# Patient Record
Sex: MALE | Race: WHITE | NOT HISPANIC OR LATINO
[De-identification: names, ages, dates, MRNs, and addresses within clinical notes are randomized per-mention and may not be internally consistent; named-entity substitution may affect disease eponyms.]

---

## 2018-11-12 ENCOUNTER — APPOINTMENT (OUTPATIENT)
Dept: OPHTHALMOLOGY | Facility: CLINIC | Age: 73
End: 2018-11-12
Payer: MEDICARE

## 2018-11-12 PROCEDURE — 92014 COMPRE OPH EXAM EST PT 1/>: CPT

## 2018-11-12 PROCEDURE — 92015 DETERMINE REFRACTIVE STATE: CPT

## 2019-08-08 ENCOUNTER — EMERGENCY (EMERGENCY)
Facility: HOSPITAL | Age: 74
LOS: 1 days | Discharge: HOME | End: 2019-08-08
Attending: EMERGENCY MEDICINE | Admitting: EMERGENCY MEDICINE
Payer: MEDICARE

## 2019-08-08 VITALS
WEIGHT: 199.08 LBS | HEART RATE: 57 BPM | SYSTOLIC BLOOD PRESSURE: 172 MMHG | OXYGEN SATURATION: 97 % | RESPIRATION RATE: 18 BRPM | TEMPERATURE: 98 F | DIASTOLIC BLOOD PRESSURE: 107 MMHG | HEIGHT: 70 IN

## 2019-08-08 PROCEDURE — 93010 ELECTROCARDIOGRAM REPORT: CPT

## 2019-08-08 PROCEDURE — 99284 EMERGENCY DEPT VISIT MOD MDM: CPT | Mod: 25

## 2019-08-08 RX ORDER — AMLODIPINE BESYLATE 2.5 MG/1
10 TABLET ORAL ONCE
Refills: 0 | Status: COMPLETED | OUTPATIENT
Start: 2019-08-08 | End: 2019-08-08

## 2019-08-08 RX ORDER — SODIUM CHLORIDE 9 MG/ML
1000 INJECTION INTRAMUSCULAR; INTRAVENOUS; SUBCUTANEOUS ONCE
Refills: 0 | Status: COMPLETED | OUTPATIENT
Start: 2019-08-08 | End: 2019-08-08

## 2019-08-08 RX ORDER — FAMOTIDINE 10 MG/ML
20 INJECTION INTRAVENOUS ONCE
Refills: 0 | Status: COMPLETED | OUTPATIENT
Start: 2019-08-08 | End: 2019-08-08

## 2019-08-08 RX ORDER — ONDANSETRON 8 MG/1
4 TABLET, FILM COATED ORAL ONCE
Refills: 0 | Status: COMPLETED | OUTPATIENT
Start: 2019-08-08 | End: 2019-08-08

## 2019-08-08 RX ORDER — SODIUM CHLORIDE 9 MG/ML
1000 INJECTION INTRAMUSCULAR; INTRAVENOUS; SUBCUTANEOUS
Refills: 0 | Status: DISCONTINUED | OUTPATIENT
Start: 2019-08-08 | End: 2019-08-12

## 2019-08-08 NOTE — ED ADULT NURSE NOTE - PMH
Aortic valve disorder  Aortic stenosis  Calculus of kidney  Kidney stones  Essential hypertension  Hypertension  Hyperlipidemia  Hyperlipidemia  Malignant melanoma of skin  Melanoma

## 2019-08-08 NOTE — ED ADULT NURSE NOTE - OBJECTIVE STATEMENT
resents to ED for chest discomfort, nausea with abd pain and elevated BP.  Patient reports symptoms began this AM and have worsened through the day.  Denies any SOB, vomiting, dizziness, numbness, tingling, HA. Denies bowel or bladder issues.

## 2019-08-08 NOTE — ED ADULT NURSE NOTE - NSIMPLEMENTINTERV_GEN_ALL_ED
Implemented All Universal Safety Interventions:  [place] to call system. Call bell, personal items and telephone within reach. Instruct patient to call for assistance. Room bathroom lighting operational. Non-slip footwear when patient is off stretcher. Physically safe environment: no spills, clutter or unnecessary equipment. Stretcher in lowest position, wheels locked, appropriate side rails in place.

## 2019-08-08 NOTE — ED PROVIDER NOTE - CLINICAL SUMMARY MEDICAL DECISION MAKING FREE TEXT BOX
Pt c/o abd pain w ttp epigastric/periumbilical and n w elevated bp (174/84 R, 184/86 L; no evening bp meds as yet).  No cp, + mild sob.  ? dissection - bp symmetric but cp/abd pain, ? pud (no h/o this), ? pancreatitis (lipase nl), + mild change in bowel habits - ? enteritis, colitis.  EKG nl, trop neg x 1 and pain since this am - no sig concern for acs.   ? viral gi since c/o gi sx and gen weakness.  WBC nl, chem nl except bili but pt reports h/o elevated bili and fatty liver.  Plan cta for dissection, gi meds, ivf, reassess.

## 2019-08-08 NOTE — ED ADULT TRIAGE NOTE - CHIEF COMPLAINT QUOTE
Presents to ED for chest discomfort, nausea with abd pain and elevated BP.  Patient reports symptoms began this AM and have worsened through the day.  Denies any SOB, vomiting, dizziness, numbness, tingling, HA.

## 2019-08-08 NOTE — ED PROVIDER NOTE - PSH
Malignant melanoma of skin  extraction H/O cervical spine surgery    History of appendectomy    Malignant melanoma of skin  extraction  S/P AVR (aortic valve replacement)

## 2019-08-08 NOTE — ED PROVIDER NOTE - PROGRESS NOTE DETAILS
CTA neg for dissection, + adrenal mass increased from prior and R renal cyst but these don't account for pt's sx.  Pt feeling better after meds, tolerating po's.  Will dc to [facility] pmd, cardiology.

## 2019-08-08 NOTE — ED PROVIDER NOTE - PMH
Aortic valve disorder  Aortic stenosis  Calculus of kidney  Kidney stones  Essential hypertension  Hypertension  Hyperlipidemia  Hyperlipidemia  Malignant melanoma of skin  Melanoma Aortic valve disorder  Aortic stenosis  CAD (coronary artery disease)    Calculus of kidney  Kidney stones  Essential hypertension  Hypertension  Hyperlipidemia  Hyperlipidemia  Malignant melanoma of skin  Melanoma

## 2019-08-08 NOTE — ED PROVIDER NOTE - OBJECTIVE STATEMENT
75 yo male h/o htn, migraine, fatty liver, melanoma s/p resection, elevated t bili (unknown baseline), cad s/p cabg, s/p avr (porcine valve), s/p cspine fusion, s/p appendectomy c/o feeling unwell since ~ 9a today.  Pt felt abd discomfort mid abd earlier today - less severe now, + n w/o v, gen weakness w/o associated cp, palpitations, fever, dysuria.  + slight radiation towards chest earlier - none now.  No h/o pud, gallstones, pancreatitis, etoh abuse. No recent exertional cp/sob.  No sick contacts, uri sx, travel.  [place] and returned to [facility] 2/2 feeling unwell.  Pain vague now.  + increased bm today but denies watery stool, black/bloody stool.  No urinary sx.  Pt noted his bp was v high today - 180-90's despite his meds; pt has not taken his evening bp med yet tonight.

## 2019-08-08 NOTE — ED PROVIDER NOTE - NSFOLLOWUPINSTRUCTIONS_ED_ALL_ED_FT
Abdominal Pain    Take Zofran - 1 tab on tongue every 6 hours as needed for nausea.  Take pepcid once a day.  Follow up with Dr Covarrubias and Dr Sanabria.  Keep a log of your blood pressures and share with Dr Sanabria to decide if you need medication adjustment.  Return for increased pain, vomiting or diarrhea, fever, any other concerns.   Share your ct results and have Dr Covarrubias arrange further outpatient imaging of your R kidney and L adrenal gland.   Enjoy your belated anniversary.    Abdominal Pain    Many things can cause abdominal pain. Many times, abdominal pain is not caused by a disease and will improve without treatment. Your health care provider will do a physical exam to determine if there is a dangerous cause of your pain; blood tests and imaging may help determine the cause of your pain. However, in many cases, no cause may be found and you may need further testing as an outpatient. Monitor your abdominal pain for any changes.     SEEK IMMEDIATE MEDICAL CARE IF YOU HAVE ANY OF THE FOLLOWING SYMPTOMS: worsening abdominal pain, uncontrollable vomiting, profuse diarrhea, inability to have bowel movements or pass gas, black or bloody stools, fever accompanying chest pain or back pain, or fainting. These symptoms may represent a serious problem that is an emergency. Do not wait to see if the symptoms will go away. Get medical help right away. Call 911 and do not drive yourself to the hospital.

## 2019-08-08 NOTE — ED PROVIDER NOTE - CARE PROVIDER_API CALL
Adalid Covarrubias)  Internal Medicine  [address], [address]  [place] [address]  Phone: [phone]  Fax: [phone]  Follow Up Time:     Guillermo Sanabria)  Cardiovascular Disease; Internal Medicine  [address] [facility] [address] [place] [address]  Phone: [phone]  Fax: [phone]  Follow Up Time:

## 2019-08-09 VITALS
DIASTOLIC BLOOD PRESSURE: 75 MMHG | TEMPERATURE: 98 F | OXYGEN SATURATION: 96 % | HEART RATE: 54 BPM | RESPIRATION RATE: 18 BRPM | SYSTOLIC BLOOD PRESSURE: 145 MMHG

## 2019-08-09 DIAGNOSIS — Z90.49 ACQUIRED ABSENCE OF OTHER SPECIFIED PARTS OF DIGESTIVE TRACT: Chronic | ICD-10-CM

## 2019-08-09 DIAGNOSIS — Z98.890 OTHER SPECIFIED POSTPROCEDURAL STATES: Chronic | ICD-10-CM

## 2019-08-09 DIAGNOSIS — Z95.2 PRESENCE OF PROSTHETIC HEART VALVE: Chronic | ICD-10-CM

## 2019-08-09 PROCEDURE — 74174 CTA ABD&PLVS W/CONTRAST: CPT

## 2019-08-09 PROCEDURE — 96375 TX/PRO/DX INJ NEW DRUG ADDON: CPT

## 2019-08-09 PROCEDURE — 82550 ASSAY OF CK (CPK): CPT

## 2019-08-09 PROCEDURE — 80053 COMPREHEN METABOLIC PANEL: CPT

## 2019-08-09 PROCEDURE — 74174 CTA ABD&PLVS W/CONTRAST: CPT | Mod: 26

## 2019-08-09 PROCEDURE — 71275 CT ANGIOGRAPHY CHEST: CPT | Mod: 26

## 2019-08-09 PROCEDURE — 93005 ELECTROCARDIOGRAM TRACING: CPT

## 2019-08-09 PROCEDURE — 82553 CREATINE MB FRACTION: CPT

## 2019-08-09 PROCEDURE — 83690 ASSAY OF LIPASE: CPT

## 2019-08-09 PROCEDURE — 85610 PROTHROMBIN TIME: CPT

## 2019-08-09 PROCEDURE — 84484 ASSAY OF TROPONIN QUANT: CPT

## 2019-08-09 PROCEDURE — 85027 COMPLETE CBC AUTOMATED: CPT

## 2019-08-09 PROCEDURE — 96361 HYDRATE IV INFUSION ADD-ON: CPT

## 2019-08-09 PROCEDURE — 99284 EMERGENCY DEPT VISIT MOD MDM: CPT | Mod: 25

## 2019-08-09 PROCEDURE — 36415 COLL VENOUS BLD VENIPUNCTURE: CPT

## 2019-08-09 PROCEDURE — 96374 THER/PROPH/DIAG INJ IV PUSH: CPT

## 2019-08-09 PROCEDURE — 85730 THROMBOPLASTIN TIME PARTIAL: CPT

## 2019-08-09 PROCEDURE — 71275 CT ANGIOGRAPHY CHEST: CPT

## 2019-08-09 RX ORDER — FAMOTIDINE 10 MG/ML
1 INJECTION INTRAVENOUS
Qty: 14 | Refills: 0
Start: 2019-08-09 | End: 2019-08-22

## 2019-08-09 RX ORDER — ONDANSETRON 8 MG/1
1 TABLET, FILM COATED ORAL
Qty: 20 | Refills: 0
Start: 2019-08-09

## 2019-08-09 RX ADMIN — AMLODIPINE BESYLATE 10 MILLIGRAM(S): 2.5 TABLET ORAL at 00:31

## 2019-08-09 RX ADMIN — SODIUM CHLORIDE 1000 MILLILITER(S): 9 INJECTION INTRAMUSCULAR; INTRAVENOUS; SUBCUTANEOUS at 00:32

## 2019-08-09 RX ADMIN — FAMOTIDINE 20 MILLIGRAM(S): 10 INJECTION INTRAVENOUS at 00:31

## 2019-08-09 RX ADMIN — SODIUM CHLORIDE 1000 MILLILITER(S): 9 INJECTION INTRAMUSCULAR; INTRAVENOUS; SUBCUTANEOUS at 01:27

## 2019-08-09 RX ADMIN — ONDANSETRON 4 MILLIGRAM(S): 8 TABLET, FILM COATED ORAL at 00:32

## 2019-08-12 DIAGNOSIS — Z79.899 OTHER LONG TERM (CURRENT) DRUG THERAPY: ICD-10-CM

## 2019-08-12 DIAGNOSIS — I10 ESSENTIAL (PRIMARY) HYPERTENSION: ICD-10-CM

## 2019-08-12 DIAGNOSIS — R07.89 OTHER CHEST PAIN: ICD-10-CM

## 2019-08-12 DIAGNOSIS — R10.33 PERIUMBILICAL PAIN: ICD-10-CM

## 2019-08-13 PROBLEM — I25.10 ATHEROSCLEROTIC HEART DISEASE OF NATIVE CORONARY ARTERY WITHOUT ANGINA PECTORIS: Chronic | Status: ACTIVE | Noted: 2019-08-09

## 2019-08-26 ENCOUNTER — APPOINTMENT (OUTPATIENT)
Dept: UROLOGY | Facility: CLINIC | Age: 74
End: 2019-08-26
Payer: MEDICARE

## 2019-08-26 VITALS
HEIGHT: 70 IN | BODY MASS INDEX: 28.06 KG/M2 | SYSTOLIC BLOOD PRESSURE: 124 MMHG | DIASTOLIC BLOOD PRESSURE: 74 MMHG | WEIGHT: 196 LBS | TEMPERATURE: 98.6 F | HEART RATE: 64 BPM

## 2019-08-26 DIAGNOSIS — N28.1 CYST OF KIDNEY, ACQUIRED: ICD-10-CM

## 2019-08-26 DIAGNOSIS — Z85.820 PERSONAL HISTORY OF MALIGNANT MELANOMA OF SKIN: ICD-10-CM

## 2019-08-26 PROCEDURE — 99205 OFFICE O/P NEW HI 60 MIN: CPT

## 2019-08-27 PROBLEM — Z85.820 HISTORY OF MALIGNANT MELANOMA: Status: RESOLVED | Noted: 2019-08-27 | Resolved: 2019-08-27

## 2019-08-27 NOTE — REVIEW OF SYSTEMS
[Abdominal Pain] : abdominal pain [Vomiting] : no vomiting [Diarrhea] : no diarrhea [Constipation] : no constipation [Skin Lesions] : skin lesion [Hot Flashes] : hot flashes [Negative] : Heme/Lymph

## 2019-08-27 NOTE — HISTORY OF PRESENT ILLNESS
[FreeTextEntry1] : This is a 73yo male who was in the [facility] ER recently with epigastric pain. A CT scan was performed to rule out aortic dissection which revealed a 2cm right renal lesion and 3.6cm left adrenal mass. He subsequently underwent an MRI which confirmed that the right renal lesion is enhancing. No adrenal workup yet. His potassium has been low, 3.2 in ER. Occasional hot flashes and occasional high blood pressure. Recently diagnosed with antiphospholipid syndrome but not on treatment. Otherwise healthy.

## 2019-08-27 NOTE — ASSESSMENT
[FreeTextEntry1] : 75yo male with 2cm right renal mass and 3.5cm left adrenal mass\par Reviewed MRI imaging with patient and his wife\par Reviewed differential diagnosis of renal mass and adrenal mass\par Discussed various management options for renal mass including observation, percutaneous biopsy, thermal ablation, open or robotic partial nephrectomy\par First needs workup to rule out functional adrenal mass. If functional, would recommend surgery on adrenal first\par If non-functioning mass, can manage renal mass first\par Metastatic adrenal lesion would be unusual for small renal mass, but could consider biopsy if non-functioning and if renal mass is malignant\par

## 2019-08-27 NOTE — LETTER BODY
[Dear  ___] : Dear  [unfilled], [Courtesy Letter:] : I had the pleasure of seeing your patient, [unfilled], in my office today. [Please see my note below.] : Please see my note below. [Consult Closing:] : Thank you very much for allowing me to participate in the care of this patient.  If you have any questions, please do not hesitate to contact me. [Sincerely,] : Sincerely, [FreeTextEntry2] : Demetrius Covarrubias MD\par [address], [address]\[place] [address] [FreeTextEntry3] : Hector Calderon MD\par Urologic Oncology\par Department of Urology\par [facility]\par \par [employer] [facility] \par \par

## 2019-08-27 NOTE — PHYSICAL EXAM
[General Appearance - Well Nourished] : well nourished [General Appearance - Well Developed] : well developed [Normal Appearance] : normal appearance [General Appearance - In No Acute Distress] : no acute distress [Well Groomed] : well groomed [Edema] : no peripheral edema [Exaggerated Use Of Accessory Muscles For Inspiration] : no accessory muscle use [Respiration, Rhythm And Depth] : normal respiratory rhythm and effort [Abdomen Tenderness] : non-tender [Abdomen Soft] : soft [Costovertebral Angle Tenderness] : no ~M costovertebral angle tenderness [] : no rash [Normal Station and Gait] : the gait and station were normal for the patient's age [Oriented To Time, Place, And Person] : oriented to person, place, and time [No Focal Deficits] : no focal deficits [Mood] : the mood was normal [Affect] : the affect was normal [Not Anxious] : not anxious

## 2019-08-28 LAB
ALDOSTERONE SERUM: 6.4 NG/DL
CORTIS SERPL-MCNC: 2.1 UG/DL

## 2019-09-06 ENCOUNTER — MESSAGE (OUTPATIENT)
Age: 74
End: 2019-09-06

## 2019-09-06 LAB
METANEPHRINE, PL: <10 PG/ML
NORMETANEPHRINE, PL: 19 PG/ML

## 2020-03-03 ENCOUNTER — APPOINTMENT (OUTPATIENT)
Dept: HEART AND VASCULAR | Facility: CLINIC | Age: 75
End: 2020-03-03
Payer: MEDICARE

## 2020-03-03 ENCOUNTER — NON-APPOINTMENT (OUTPATIENT)
Age: 75
End: 2020-03-03

## 2020-03-03 VITALS
TEMPERATURE: 98.5 F | SYSTOLIC BLOOD PRESSURE: 175 MMHG | OXYGEN SATURATION: 98 % | HEART RATE: 52 BPM | DIASTOLIC BLOOD PRESSURE: 65 MMHG | WEIGHT: 188.25 LBS | HEIGHT: 70 IN | BODY MASS INDEX: 26.95 KG/M2

## 2020-03-03 VITALS — SYSTOLIC BLOOD PRESSURE: 176 MMHG | DIASTOLIC BLOOD PRESSURE: 79 MMHG

## 2020-03-03 DIAGNOSIS — Z87.891 PERSONAL HISTORY OF NICOTINE DEPENDENCE: ICD-10-CM

## 2020-03-03 PROCEDURE — 93000 ELECTROCARDIOGRAM COMPLETE: CPT

## 2020-03-03 PROCEDURE — 99205 OFFICE O/P NEW HI 60 MIN: CPT | Mod: 25

## 2020-03-04 ENCOUNTER — TRANSCRIPTION ENCOUNTER (OUTPATIENT)
Age: 75
End: 2020-03-04

## 2020-03-09 ENCOUNTER — APPOINTMENT (OUTPATIENT)
Dept: HEART AND VASCULAR | Facility: CLINIC | Age: 75
End: 2020-03-09
Payer: MEDICARE

## 2020-03-09 PROCEDURE — 93351 STRESS TTE COMPLETE: CPT

## 2020-03-09 NOTE — REASON FOR VISIT
[FreeTextEntry1] : Diagnostic Tests:\par ----------------------------------\par ECG: \par 03/03/20: marked sinus bradycardia at 49 bpm, incomplete RBBB. \par ----------------------------------\par MR:\par 08/12/19: abdomen: left adrenal nodule 2.7cm, 2cm right renal cell carcinoma, hepatic steatosis. \par ----------------------------------\par CT:\par 08/09/19: CTA chest/abdomen/pelvis: no aortic dissection, + severe aortic and coronary plaque, aortic valve calcifications, 3.6cm left adrenal mass, 2cm right renal mass. \par ---------------------------------\par Echo:\par 03/09/20: EF 63%, mildly dilated LA, s/p bioprosthetic AVR normally functioning, mild MR, mild MS secondary to MAC, PASP 32mmHg. \par 01/1019: EF 55%, LVH, dilated LA, bioprosthetic AVR normally functioning, dilated ascending aorta. \par ---------------------------------\par Stress:\par 03/09/20: exercise stress echo: EF 63%, mild MR, s/p bioprosthetic AVR, normal wall motion, PA pressures, and ECG post 10.1 METs. \par 07/21/15: ETT: 7 minutes of Wilson, no ischemia.

## 2020-03-09 NOTE — ASSESSMENT
[FreeTextEntry1] : 1. CAD: s/p CABG x 2 at time of NOE 04/25/15, SVG-LCx, SVG-RCA, Russ Lester MD at [facility]\par      - will pursue aggressive medical management\par      - continue ASA 81mg po QD\par     - will send for an exercise stress echocardiogram to rule out inducible ischemia\par \par 2. Aortic stenosis: s/p bioprosthetic AVR, #25 Medtronic porcine, Russ Lester MD at [facility] 04/25/15\par      - will send for an echocardiogram \par      - discussed with patient SBE prophylaxis per AHA guidelines\par \par 3. HTN: ? secondary to Cushing's syndrome: BP not at ACC/AHA 2017 guideline target, markedly elevated in office today but patient states BP is labile on home log\par      - continue amlodipine 10mg po QD\par      - continue losartan//25mg po QD\par      - will review home BP log next visit\par      - if BP remains above target next visit will up titrate anti-HTN regimen\par \par 4. Dyslipidemia: lipids at goal\par      - continue rosuvastatin 40mg po QD\par \par 5. 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 Antiphospholipid syndrome: no history of arterial or venous thrombosis:\par      - follow up with hematologist\par \par \par

## 2020-03-09 NOTE — PHYSICAL EXAM
[S3] : no S3 [S4] : no S4 [Right Carotid Bruit] : no bruit heard over the right carotid [Left Carotid Bruit] : no bruit heard over the left carotid [Right Femoral Bruit] : no bruit heard over the right femoral artery [Left Femoral Bruit] : no bruit heard over the left femoral artery [FreeTextEntry1] : + left GSV harvest

## 2020-03-09 NOTE — HISTORY OF PRESENT ILLNESS
[FreeTextEntry1] : Mr. Castorena presents for initial evaluation and management of CAD s/p CABG x 2 (04/12/15 at time of AVR), aortic stenosis s/p biorpsthetic AVR (04/12/15), aortic atherosclerosis, HTN, Cushing's syndrome, and antiphospholipid syndrome.   He was previously followed by Guillermo Muir MD.   On 04/12/15 he underwent a bioprosthetic AVR (# 25 Medtronic porcine) and CABG x 2 with Russ Lester MD at [facility].  In August, 2019 he had complaints of abdominal pain and underwent a CTA chest/abdomen/pelvis on 08/19/19 which revealed no aortic dissection, + severe aortic and coronary plaque, aortic valve calcifications, a 3.6cm left adrenal mass, and 2cm right renal mass.  He had removal of the right renal mass which was benign.   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  He is being seen by a neurosurgeon, Carol Flores MD, and a head and neck surgeon, Javier Arguelles MD, at [facility] and may have pituitary surgery in the near future.  At present, he denies any cardiovascular complaints.  \par \par

## 2020-03-09 NOTE — ADDENDUM
[FreeTextEntry1] : 03/09/20: Mr. Castorena had an exercise stress echocardiogram today which revealed an EF of 63%, mild MR, s/p bioprosthetic AVR, normal wall motion, PA pressures, and ECG post 10.1 METs. He may, therefore, proceed with surgery without cardiac contraindications.  He should hold ASA for 5 days prior.  \par

## 2020-05-05 ENCOUNTER — APPOINTMENT (OUTPATIENT)
Dept: HEART AND VASCULAR | Facility: CLINIC | Age: 75
End: 2020-05-05

## 2020-05-28 ENCOUNTER — APPOINTMENT (OUTPATIENT)
Dept: HEART AND VASCULAR | Facility: CLINIC | Age: 75
End: 2020-05-28
Payer: MEDICARE

## 2020-05-28 ENCOUNTER — NON-APPOINTMENT (OUTPATIENT)
Age: 75
End: 2020-05-28

## 2020-05-28 VITALS
HEIGHT: 70 IN | BODY MASS INDEX: 25.34 KG/M2 | HEART RATE: 57 BPM | DIASTOLIC BLOOD PRESSURE: 70 MMHG | WEIGHT: 177 LBS | OXYGEN SATURATION: 99 % | TEMPERATURE: 98.5 F | SYSTOLIC BLOOD PRESSURE: 138 MMHG

## 2020-05-28 VITALS
HEIGHT: 70 IN | BODY MASS INDEX: 25.34 KG/M2 | WEIGHT: 177 LBS | DIASTOLIC BLOOD PRESSURE: 70 MMHG | OXYGEN SATURATION: 99 % | SYSTOLIC BLOOD PRESSURE: 138 MMHG | HEART RATE: 57 BPM

## 2020-05-28 PROCEDURE — 99215 OFFICE O/P EST HI 40 MIN: CPT | Mod: 25

## 2020-05-28 PROCEDURE — 93000 ELECTROCARDIOGRAM COMPLETE: CPT

## 2020-05-29 LAB
ALBUMIN SERPL ELPH-MCNC: 4.6 G/DL
ALP BLD-CCNC: 56 U/L
ALT SERPL-CCNC: 28 U/L
ANION GAP SERPL CALC-SCNC: 14 MMOL/L
AST SERPL-CCNC: 20 U/L
BASOPHILS # BLD AUTO: 0.04 K/UL
BASOPHILS NFR BLD AUTO: 0.4 %
BILIRUB SERPL-MCNC: 1.4 MG/DL
BUN SERPL-MCNC: 22 MG/DL
CALCIUM SERPL-MCNC: 10.2 MG/DL
CHLORIDE SERPL-SCNC: 98 MMOL/L
CHOLEST SERPL-MCNC: 101 MG/DL
CHOLEST/HDLC SERPL: 3 RATIO
CO2 SERPL-SCNC: 28 MMOL/L
CREAT SERPL-MCNC: 0.96 MG/DL
EOSINOPHIL # BLD AUTO: 0.08 K/UL
EOSINOPHIL NFR BLD AUTO: 0.8 %
GLUCOSE SERPL-MCNC: 102 MG/DL
HCT VFR BLD CALC: 45.6 %
HDLC SERPL-MCNC: 34 MG/DL
HGB BLD-MCNC: 15 G/DL
IMM GRANULOCYTES NFR BLD AUTO: 0.5 %
LDLC SERPL CALC-MCNC: 43 MG/DL
LDLC SERPL DIRECT ASSAY-MCNC: 47 MG/DL
LYMPHOCYTES # BLD AUTO: 1.66 K/UL
LYMPHOCYTES NFR BLD AUTO: 16.7 %
MAN DIFF?: NORMAL
MCHC RBC-ENTMCNC: 30.9 PG
MCHC RBC-ENTMCNC: 32.9 GM/DL
MCV RBC AUTO: 94 FL
MONOCYTES # BLD AUTO: 1.34 K/UL
MONOCYTES NFR BLD AUTO: 13.5 %
NEUTROPHILS # BLD AUTO: 6.79 K/UL
NEUTROPHILS NFR BLD AUTO: 68.1 %
PLATELET # BLD AUTO: 240 K/UL
POTASSIUM SERPL-SCNC: 4.3 MMOL/L
PROT SERPL-MCNC: 7 G/DL
RBC # BLD: 4.85 M/UL
RBC # FLD: 13.3 %
SODIUM SERPL-SCNC: 141 MMOL/L
TRIGL SERPL-MCNC: 120 MG/DL
TSH SERPL-ACNC: 1.55 UIU/ML
WBC # FLD AUTO: 9.96 K/UL

## 2020-06-01 NOTE — ASSESSMENT
[FreeTextEntry1] : 1. CAD: s/p CABG x 2 at time of AVR 04/25/15, SVG-LCx, SVG-RCA, Russ Lester MD at [facility]: s/p 03/09/20: exercise stress echo: EF 63%, mild MR, s/p bioprosthetic AVR, normal wall motion, PA pressures, and ECG post 10.1 METs: had 16 beat run of NVST pst-op pituitary surgery 05/26/20. \par      - will pursue aggressive medical management\par      - continue ASA 81mg po QD\par      - will send for a CTCA to assess burden of ischemia\par \par 2. Aortic stenosis: s/p bioprosthetic AVR, #25 Medtronic porcine, Russ Lester MD at [facility] 04/25/15, s/p echo 05/22/20: EF 67%, normally functioning bioprosthetic AVR, mild LVH, dilated LA. \par      - discussed with patient SBE prophylaxis per AHA guidelines\par      - will follow with serial echocardiograms\par \par 3. HTN: ? secondary to Cushing's syndrome: BP not at ACC/AHA 2017 guideline target, BP improved\par      - continue amlodipine 10mg po QD\par      - continue losartan//25mg po QD\par      - continue Toprol XL 50mg po QD\par      - if BP remains above target next visit will up titrate anti-HTN regimen\par \par 4. Dyslipidemia: lipids at goal\par      - continue rosuvastatin 40mg po QD\par \par 5. 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 Antiphospholipid syndrome: no history of arterial or venous thrombosis:\par      - follow up with hematologist\par \par 7. Nonsustained ventricular tachycardia: 16 beat run NSVT post-op (05/26/20)\par    - will order a cardiac CTCA to assess native vessels and bypass grafts\par    - will order a 30 day MCT patch monitor (Biotel, 99Bill)\par \par \par

## 2020-06-01 NOTE — HISTORY OF PRESENT ILLNESS
[FreeTextEntry1] : Mr. Castorena presents for follow up and management of CAD s/p CABG x 2 (04/12/15 at time of AVR), aortic stenosis s/p bioprosthetic AVR (04/12/15), aortic atherosclerosis, HTN, Cushing's syndrome, and antiphospholipid syndrome.   He was previously followed by Guillermo Muir MD.   On 04/12/15 he underwent a bioprosthetic AVR (# 25 Medtronic porcine) and CABG x 2 with Russ Lester MD at [facility].  In August, 2019 he had complaints of abdominal pain and underwent a CTA chest/abdomen/pelvis on 08/19/19 which revealed no aortic dissection, + severe aortic and coronary plaque, aortic valve calcifications, a 3.6cm left adrenal mass, and 2cm right renal mass.  He had removal of the right renal mass which was benign.   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  He is following with a neurosurgeon, Carol Flores MD, and a head and neck surgeon, Javier Arguelles MD, at [facility] and had pituitary surgery on 05/16/20. He was admitted for 5 days in the post operative period mostly for cardiac observation. On 05/26/20 I received a call from a cardiologist at [facility], Angelica Katz MD, informing me of a 16 beat run of NSVT during his post operative course.  The run was asymptomatic.  He had an echocardiogram on 05/22/20 which revealed an EF of 67%, normally functioning bioprosthetic AVR, mild LVH, and a dilated LA.  He was initiated on Toprol XL 50mg p QD.  He feels well since discharge.   He denies chest pain or TEIXEIRA.  \par \par

## 2020-06-01 NOTE — PHYSICAL EXAM
[FreeTextEntry1] : + left GSV harvest [S3] : no S3 [Left Carotid Bruit] : no bruit heard over the left carotid [S4] : no S4 [Right Carotid Bruit] : no bruit heard over the right carotid [Left Femoral Bruit] : no bruit heard over the left femoral artery [Right Femoral Bruit] : no bruit heard over the right femoral artery

## 2020-06-01 NOTE — REASON FOR VISIT
[Follow-Up - Clinic] : a clinic follow-up of [FreeTextEntry1] : Diagnostic Tests:\par ----------------------------------\par ECG: \par 05/28/20: sinus bradycardia at 53 bpm, incomplete RBBB. \par 03/03/20: marked sinus bradycardia at 49 bpm, incomplete RBBB. \par ----------------------------------\par MR:\par 08/12/19: abdomen: left adrenal nodule 2.7cm, 2cm right renal cell carcinoma, hepatic steatosis. \par ----------------------------------\par CT:\par 03/10/20: CT abdomen/pelvis: s/p excision right renal mass, cyst lateral to aortic bifurcation, aorta-iliac atherosclerotic calcification. \par 08/09/19: CTA chest/abdomen/pelvis: no aortic dissection, + severe aortic and coronary plaque, aortic valve calcifications, 3.6cm left adrenal mass, 2cm right renal mass. \par ---------------------------------\par Echo:\par 05/22/20: EF 67%, normally functioning bioprosthetic AVR, mild LVH, dilated LA. 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 \par 01/1019: EF 55%, LVH, dilated LA, bioprosthetic AVR normally functioning, dilated ascending aorta. \par ---------------------------------\par Stress:\par 03/09/20: exercise stress echo: EF 63%, mild MR, s/p bioprosthetic AVR, normal wall motion, PA pressures, and ECG post 10.1 METs. \par 07/21/15: ETT: 7 minutes of Wilson, no ischemia.

## 2020-06-09 ENCOUNTER — TRANSCRIPTION ENCOUNTER (OUTPATIENT)
Age: 75
End: 2020-06-09

## 2020-06-16 ENCOUNTER — OUTPATIENT (OUTPATIENT)
Dept: OUTPATIENT SERVICES | Facility: HOSPITAL | Age: 75
LOS: 1 days | End: 2020-06-16
Payer: MEDICARE

## 2020-06-16 ENCOUNTER — NON-APPOINTMENT (OUTPATIENT)
Age: 75
End: 2020-06-16

## 2020-06-16 ENCOUNTER — RESULT REVIEW (OUTPATIENT)
Age: 75
End: 2020-06-16

## 2020-06-16 ENCOUNTER — APPOINTMENT (OUTPATIENT)
Dept: CT IMAGING | Facility: HOSPITAL | Age: 75
End: 2020-06-16
Payer: MEDICARE

## 2020-06-16 ENCOUNTER — APPOINTMENT (OUTPATIENT)
Dept: HEART AND VASCULAR | Facility: CLINIC | Age: 75
End: 2020-06-16
Payer: MEDICARE

## 2020-06-16 VITALS
WEIGHT: 170 LBS | SYSTOLIC BLOOD PRESSURE: 134 MMHG | TEMPERATURE: 94.4 F | HEIGHT: 70 IN | DIASTOLIC BLOOD PRESSURE: 72 MMHG | BODY MASS INDEX: 24.34 KG/M2 | HEART RATE: 39 BPM

## 2020-06-16 DIAGNOSIS — Z90.49 ACQUIRED ABSENCE OF OTHER SPECIFIED PARTS OF DIGESTIVE TRACT: Chronic | ICD-10-CM

## 2020-06-16 DIAGNOSIS — Z98.890 OTHER SPECIFIED POSTPROCEDURAL STATES: Chronic | ICD-10-CM

## 2020-06-16 DIAGNOSIS — Z95.2 PRESENCE OF PROSTHETIC HEART VALVE: Chronic | ICD-10-CM

## 2020-06-16 PROCEDURE — 75574 CT ANGIO HRT W/3D IMAGE: CPT

## 2020-06-16 PROCEDURE — 75574 CT ANGIO HRT W/3D IMAGE: CPT | Mod: 26

## 2020-06-16 PROCEDURE — 99203 OFFICE O/P NEW LOW 30 MIN: CPT | Mod: 25

## 2020-06-16 PROCEDURE — 93000 ELECTROCARDIOGRAM COMPLETE: CPT

## 2020-06-17 NOTE — DISCUSSION/SUMMARY
[FreeTextEntry1] : 74 y/o M with h/o CABG / AVR (2015), pituitary tumor s/p surgery 5/16/20 with NSVT noted during postop period. Currently on Toprol 50 mg started for NSVT since end of May.  Has normal LVEF. Pt and wife have notice fatigue and decrease activity tolerance, which we suspect could be from the side effect of Toprol. He is bradycardic today to the low 40. The episode of niecy/pause on 6/11 when he was on the toilet was very likely secondary to high vagal tone (pt states he was straining for BM). Given SE of Toprol and overall bradycardia, we discuss tapering down Toprol to 25 mg daily for 1 week, after which he can stop it.  He should continue wearing the Event Monitor during this course of medication change. We ask him to gauge his activity tolerance to see if he would feel better being on lower dose of toprol and when off it. We also explain the neurocardiac relationship (ie, accelerated ventricular rhythm post neuro sx).  FInally, we also explain that if he would need beta-blocker in the future for CAD (he is pending on CTA coronary result), we would then talk about PPM option in order to allow safe use of BB. \par He knows to call us in 2 weeks. If symptoms still persist then, will do EKG.  \par Will f/u in office in 1 month. \par \par

## 2020-06-17 NOTE — HISTORY OF PRESENT ILLNESS
[FreeTextEntry1] : 76 y/o M with history of bioprosthetic AVR / CABG x 2 in 2015 at [place] (SVG-RCA and SVG-LCx), aortic atherosclerosis, HTN, right renal mass resection (benign), left adrenal mass, Cushing's disease likely secondary to pituitary tumor, and antiphospholipid syndrome. He underwent pituitary surgery on 5/16/20 at [facility]. While there during postop course, he was noted to have a 16 beat run of NSVT. Echo showed EF 67%, normally functioning bioprosthetic AVR, mild LVH, and a dilated LA.  He was initiated on Toprol XL 50mg QD.  He followed up with Dr. Martinez and was given an Event Monitor (Ge.[email or web address]) to wear from 6/4 to 7/3.  On 06/11/20 had 3.6 second pause during bowel movement noted on MCT monitor.  He was thus referred to us for initial consult for this.\par He states that he has been feeling somewhat more tired after being discharged from the hospital.  Wife says that he walks less than what he used to. He needs to take naps in the afternoon now.  He only new med since recent hospitalization is Toprol 50 mg. \par He doesn't recall any specific symptoms on 6/11 morning when he was wearing the monitor and while on the toilet.  Denies palpitations, CP, dizziness, syncope.  \par \par Pertinent outside record presented today: \par EKG 5/22/20 ([facility]): Accelerated ventricular rhythm at 72 bpm \par

## 2020-06-29 ENCOUNTER — APPOINTMENT (OUTPATIENT)
Dept: HEART AND VASCULAR | Facility: CLINIC | Age: 75
End: 2020-06-29
Payer: MEDICARE

## 2020-06-29 VITALS
HEART RATE: 55 BPM | OXYGEN SATURATION: 97 % | TEMPERATURE: 98.9 F | HEIGHT: 70 IN | WEIGHT: 173 LBS | BODY MASS INDEX: 24.77 KG/M2 | DIASTOLIC BLOOD PRESSURE: 65 MMHG | RESPIRATION RATE: 18 BRPM | SYSTOLIC BLOOD PRESSURE: 144 MMHG

## 2020-06-29 VITALS — SYSTOLIC BLOOD PRESSURE: 142 MMHG | DIASTOLIC BLOOD PRESSURE: 73 MMHG

## 2020-06-29 PROCEDURE — 99215 OFFICE O/P EST HI 40 MIN: CPT

## 2020-06-29 NOTE — REASON FOR VISIT
[FreeTextEntry1] : Diagnostic Tests:\par ----------------------------------\par ECG: \par 06/16/20: sinus bradycardia at 42 bpm, incomplete RBBB. \par 05/28/20: sinus bradycardia at 53 bpm, incomplete RBBB. \par 03/03/20: marked sinus bradycardia at 49 bpm, incomplete RBBB. \par ----------------------------------\par MR:\par 08/12/19: abdomen: left adrenal nodule 2.7cm, 2cm right renal cell carcinoma, hepatic steatosis. \par ----------------------------------\par CT:\par 06/16/20: CTCA: SVG-distal RCA patent, SVG-OM occluded, LM normal, LAD prox severe, mid moderate, D1 moderate, D2 mild, LCx mild, OM1 severe, LPL normal, RCA prox severe, mid occluded, distal with patent graft.\par 03/10/20: CT abdomen/pelvis: s/p excision right renal mass, cyst lateral to aortic bifurcation, aorta-iliac atherosclerotic calcification. \par 08/09/19: CTA chest/abdomen/pelvis: no aortic dissection, + severe aortic and coronary plaque, aortic valve calcifications, 3.6cm left adrenal mass, 2cm right renal mass. \par ---------------------------------\par Echo:\par 05/22/20: EF 67%, normally functioning bioprosthetic AVR, mild LVH, dilated LA. 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 \par 01/1019: EF 55%, LVH, dilated LA, bioprosthetic AVR normally functioning, dilated ascending aorta. \par ---------------------------------\par Stress:\par 03/09/20: exercise stress echo: EF 63%, mild MR, s/p bioprosthetic AVR, normal wall motion, PA pressures, and ECG post 10.1 METs. \par 07/21/15: ETT: 7 minutes of Wilson, no ischemia. \par ---------------------------------\par Cath:\par 03/03/15: LM normal, LAD prox 60%, distal 70%, LCX 80%, OM1 80%, RCA prox 60%, AV peak-to-peak 49mmHg, MARYLOU 0.9cm2

## 2020-06-29 NOTE — REVIEW OF SYSTEMS
[Chest Pain] : chest pain [Shortness Of Breath] : shortness of breath [FreeTextEntry1] : + cold feet

## 2020-06-29 NOTE — HISTORY OF PRESENT ILLNESS
[FreeTextEntry1] : Mr. Castorena presents for follow up and management of CAD s/p CABG x 2 (04/12/15 at time of AVR), aortic stenosis s/p bioprosthetic AVR (04/12/15), aortic atherosclerosis, HTN, Cushing's syndrome, and antiphospholipid syndrome.   He was previously followed by Guillermo Muir MD.   On 04/12/15 he underwent a bioprosthetic AVR (# 25 Medtronic porcine) and CABG x 2 with Russ Lester MD at [facility].  In August, 2019 he had complaints of abdominal pain and underwent a CTA chest/abdomen/pelvis on 08/19/19 which revealed no aortic dissection, + severe aortic and coronary plaque, aortic valve calcifications, a 3.6cm left adrenal mass, and 2cm right renal mass.  He had removal of the right renal mass which was benign.   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  He is following with a neurosurgeon, Carol Flores MD, and a head and neck surgeon, Javier Arguelles MD, at [facility] and had pituitary surgery on 05/16/20. He was admitted for 5 days in the post operative period mostly for cardiac observation. On 05/26/20 I received a call from a cardiologist at [facility], Angelica Katz MD, informing me of a 16 beat run of NSVT during his post operative course.  The run was asymptomatic.  He had an echocardiogram on 05/22/20 which revealed an EF of 67%, normally functioning bioprosthetic AVR, mild LVH, and a dilated LA.  He was initiated on Toprol XL 50mg po QD.  On 06/11/20 had a 3.6 second pause during bowel movement noted on MCT monitor.  We agreed to see a heart rhythm specialist. He was evaluated by Franchesca Murcia MD and was tapered off beta blockers.  On 06/16/20 he underwent a CTCA which revealed SVG-distal RCA patent, SVG-OM occluded, LM normal, LAD prox severe, mid moderate, D1 moderate, D2 mild, LCx mild, OM1 severe, LPL normal, RCA prox severe, mid occluded, and distal with patent graft.  We discussed the results and agreed to pursue an invasive coronary angiogram with Ruben Acosta MD on 07/02/20.  He admits to angina lasting 10 to 15 minutes at a time.  In addition, he has complaints of cold feet.    \par \par \par \par \par

## 2020-06-29 NOTE — ASSESSMENT
[FreeTextEntry1] : 1. CAD: s/p CABG x 2 at time of AVR 04/25/15, SVG-LCx, SVG-RCA, Russ Lester MD at [facility]: s/p 06/16/20: CTCA: SVG-distal RCA patent, SVG-OM occluded, LM normal, LAD prox severe, mid moderate, D1 moderate, D2 mild, LCx mild, OM1 severe, LPL normal, RCA prox severe, mid occluded, distal with patent graft.\par      - will pursue aggressive medical management\par      - continue ASA 81mg po QD\par      - will send for an invasive coronary angiogram with Ruben Acosta MD on 06/02/20 (risks, benefits,alternatives discussed, all procedure related questions answered) (lab work and COVID-19 nasal swab ordered today)\par \par 2. Aortic stenosis: s/p bioprosthetic AVR, #25 Medtronic porcine, Russ Lester MD at [facility] 04/25/15, s/p echo 05/22/20: EF 67%, normally functioning bioprosthetic AVR, mild LVH, dilated LA. \par      - discussed with patient SBE prophylaxis per AHA guidelines\par      - will follow with serial echocardiograms\par \par 3. HTN: ? secondary to Cushing's syndrome: BP not at ACC/AHA 2017 guideline target, BP improved\par      - continue amlodipine 10mg po QD\par      - continue losartan//25mg po QD\par      - if BP remains above target next visit will up titrate anti-HTN regimen\par \par 8. r/o PAD: + cold feet\par      - will send for bilateral lower extremity arterial sonography\par \par 4. Dyslipidemia: lipids at goal\par      - continue rosuvastatin 40mg po QD\par \par 5. 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 Antiphospholipid syndrome: no history of arterial or venous thrombosis:\par      - follow up with hematologist\par \par 7. Nonsustained ventricular tachycardia: 16 beat run NSVT post-op (05/26/20)\par    - will send for an invasive coronary angiogram to address underlying ischemia\par \par \par

## 2020-06-29 NOTE — PHYSICAL EXAM
[Bradycardia] : bradycardic [II] : a grade 2 [Crescendo-Decrescendo] : crescendo-decrescendo [FreeTextEntry1] : + left GSV harvest [S3] : no S3 [S4] : no S4 [Right Carotid Bruit] : no bruit heard over the right carotid [Left Carotid Bruit] : no bruit heard over the left carotid [Right Femoral Bruit] : no bruit heard over the right femoral artery [Left Femoral Bruit] : no bruit heard over the left femoral artery

## 2020-06-30 VITALS
HEIGHT: 69 IN | SYSTOLIC BLOOD PRESSURE: 145 MMHG | RESPIRATION RATE: 20 BRPM | OXYGEN SATURATION: 98 % | WEIGHT: 169.98 LBS | DIASTOLIC BLOOD PRESSURE: 67 MMHG | TEMPERATURE: 98 F | HEART RATE: 49 BPM

## 2020-06-30 LAB
ALBUMIN SERPL ELPH-MCNC: 5.1 G/DL
ALP BLD-CCNC: 70 U/L
ALT SERPL-CCNC: 45 U/L
ANION GAP SERPL CALC-SCNC: 16 MMOL/L
APTT BLD: 68.9 SEC
AST SERPL-CCNC: 31 U/L
BASOPHILS # BLD AUTO: 0.05 K/UL
BASOPHILS NFR BLD AUTO: 0.4 %
BILIRUB SERPL-MCNC: 2.1 MG/DL
BUN SERPL-MCNC: 11 MG/DL
CALCIUM SERPL-MCNC: 10.4 MG/DL
CHLORIDE SERPL-SCNC: 103 MMOL/L
CHOLEST SERPL-MCNC: 100 MG/DL
CHOLEST/HDLC SERPL: 2.5 RATIO
CO2 SERPL-SCNC: 27 MMOL/L
CREAT SERPL-MCNC: 0.85 MG/DL
EOSINOPHIL # BLD AUTO: 0.08 K/UL
EOSINOPHIL NFR BLD AUTO: 0.7 %
ESTIMATED AVERAGE GLUCOSE: 108 MG/DL
GLUCOSE SERPL-MCNC: 102 MG/DL
HBA1C MFR BLD HPLC: 5.4 %
HCT VFR BLD CALC: 46.7 %
HDLC SERPL-MCNC: 40 MG/DL
HGB BLD-MCNC: 14.9 G/DL
IMM GRANULOCYTES NFR BLD AUTO: 0.3 %
INR PPP: 1.25 RATIO
LDLC SERPL CALC-MCNC: 36 MG/DL
LDLC SERPL DIRECT ASSAY-MCNC: 41 MG/DL
LYMPHOCYTES # BLD AUTO: 1.52 K/UL
LYMPHOCYTES NFR BLD AUTO: 13.2 %
MAN DIFF?: NORMAL
MCHC RBC-ENTMCNC: 30.5 PG
MCHC RBC-ENTMCNC: 31.9 GM/DL
MCV RBC AUTO: 95.5 FL
MONOCYTES # BLD AUTO: 1.35 K/UL
MONOCYTES NFR BLD AUTO: 11.7 %
NEUTROPHILS # BLD AUTO: 8.46 K/UL
NEUTROPHILS NFR BLD AUTO: 73.7 %
PLATELET # BLD AUTO: 240 K/UL
POTASSIUM SERPL-SCNC: 5.1 MMOL/L
PROT SERPL-MCNC: 7.5 G/DL
PT BLD: 14.7 SEC
RBC # BLD: 4.89 M/UL
RBC # FLD: 14.5 %
SARS-COV-2 N GENE NPH QL NAA+PROBE: NOT DETECTED
SODIUM SERPL-SCNC: 146 MMOL/L
TRIGL SERPL-MCNC: 119 MG/DL
TSH SERPL-ACNC: 2 UIU/ML
WBC # FLD AUTO: 11.5 K/UL

## 2020-06-30 RX ORDER — ASPIRIN/CALCIUM CARB/MAGNESIUM 324 MG
0 TABLET ORAL
Qty: 0 | Refills: 0 | DISCHARGE

## 2020-06-30 NOTE — H&P ADULT - RS GEN PE MLT RESP DETAILS PC
normal/respirations non-labored/no rhonchi/no wheezes/airway patent/no rales/no crackles/clear to auscultation bilaterally/good air movement/breath sounds equal

## 2020-06-30 NOTE — H&P ADULT - ASSESSMENT
76 y/o M with PMHx of CAD s/p CABG x 2 (04/12/15 at time of AVR), aortic stenosis s/p bioprosthetic AVR (04/12/2015 at [facility]), aortic atherosclerosis HTN, Cushing Syndrome, and antiphospholipid syndrome presented to his cardiologist, Dr. Finkelstein endorsing 2 episodes of "crushing" chest pain described as a 5-10 lb weight on his chest over the past 3 weeks. Patient also admits that he use to walk 1-4 miles a day and is now experiencing SOB while walking uphill or if talking/walking at the same time that is new for him. 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  CCTA 06/16/2020: SVG-distal RCA patent, SVG-OM occluded, LAD pro severe, mid moderate, D1 moderate, D2 mild, LCx mild, OM1 severe, LPL normal, RCA prox severe, mid occluded, distal with patent graft.  ECHO 05/22/2020: EF: 67%, normally functioning bioprosthetic aVR, mild LVH, dilated LA. Exercise Stress Test 05/22/2020: Mild Mr, s/p bioprosthetic AVR, normal wall motion PA pressures and ECC post 10.1 METS. EF: 63%.    In light of patient's risk factors and abnormal CCTA on 06/16/2020, patient presents for cardiac catheterization with possible intervention if clinically indicated.    -H/H = 13.9/39.8 -- Denies BRBPR, hematuria, hematochezia, melena. Loaded with 325mg ASA x1 and 600mg Plavix x1  -Cr = ____, EF 67% per echo. Euvolemic on exam. IVF NS @ 75cc/hr started pre procedure  -Type of sedation: moderate  -Candidate for sedation: yes    Risks & benefits of procedure and alternative therapy have been explained to the patient including but not limited to: allergic reaction, bleeding w/possible need for blood transfusion, infection, renal and vascular compromise, limb damage, arrhythmia, stroke, vessel dissection/perforation, Myocardial infarction, emergent CABG. Informed consent obtained and in chart. 74 y/o M with PMHx of CAD s/p CABG x 2 (04/12/15 at time of AVR), aortic stenosis s/p bioprosthetic AVR (04/12/2015 at [facility]), aortic atherosclerosis HTN, Cushing Syndrome, and antiphospholipid syndrome presented to his cardiologist, Dr. Finkelstein endorsing 2 episodes of "crushing" chest pain described as a 5-10 lb weight on his chest over the past 3 weeks. Patient also admits that he use to walk 1-4 miles a day and is now experiencing SOB while walking uphill or if talking/walking at the same time that is new for him. 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  CCTA 06/16/2020: SVG-distal RCA patent, SVG-OM occluded, LAD pro severe, mid moderate, D1 moderate, D2 mild, LCx mild, OM1 severe, LPL normal, RCA prox severe, mid occluded, distal with patent graft.  ECHO 05/22/2020: EF: 67%, normally functioning bioprosthetic aVR, mild LVH, dilated LA. Exercise Stress Test 05/22/2020: Mild Mr, s/p bioprosthetic AVR, normal wall motion PA pressures and ECC post 10.1 METS. EF: 63%.    In light of patient's risk factors and abnormal CCTA on 06/16/2020, patient presents for cardiac catheterization with possible intervention if clinically indicated.    -H/H = 13.9/39.8 -- Denies BRBPR, hematuria, hematochezia, melena. Loaded with 325mg ASA x1 and 600mg Plavix x1  -Cr = 0.72, EF 67% per echo. Euvolemic on exam. IVF NS @ 75cc/hr started pre procedure  -Type of sedation: moderate  -Candidate for sedation: yes    Risks & benefits of procedure and alternative therapy have been explained to the patient including but not limited to: allergic reaction, bleeding w/possible need for blood transfusion, infection, renal and vascular compromise, limb damage, arrhythmia, stroke, vessel dissection/perforation, Myocardial infarction, emergent CABG. Informed consent obtained and in chart. 76 y/o M with PMHx of CAD s/p CABG x 2 (04/12/15 at time of AVR), aortic stenosis s/p bioprosthetic AVR (04/12/2015 at [facility]), aortic atherosclerosis HTN, Cushing Syndrome, and antiphospholipid syndrome presented to his cardiologist, Dr. Finkelstein endorsing 2 episodes of "crushing" chest pain described as a 5-10 lb weight on his chest over the past 3 weeks. Patient also admits that he use to walk 1-4 miles a day and is now experiencing SOB while walking uphill or if talking/walking at the same time that is new for him. 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  CCTA 06/16/2020: SVG-distal RCA patent, SVG-OM occluded, LAD pro severe, mid moderate, D1 moderate, D2 mild, LCx mild, OM1 severe, LPL normal, RCA prox severe, mid occluded, distal with patent graft.  ECHO 05/22/2020: EF: 67%, normally functioning bioprosthetic aVR, mild LVH, dilated LA. Exercise Stress Test 05/22/2020: Mild Mr, s/p bioprosthetic AVR, normal wall motion PA pressures and ECC post 10.1 METS. EF: 63%.    In light of patient's risk factors and abnormal CCTA on 06/16/2020, patient presents for cardiac catheterization with possible intervention if clinically indicated.    -H/H = 13.9/39.8 -- Denies BRBPR, hematuria, hematochezia, melena. Loaded with 325mg ASA x1 and 600mg Plavix x1  -Cr = 0.72, EF 67% per echo. Euvolemic on exam. IVF NS @ 75cc/hr started pre procedure  -K 3.5. Administered 40meq KCl x1  -Type of sedation: moderate  -Candidate for sedation: yes    Risks & benefits of procedure and alternative therapy have been explained to the patient including but not limited to: allergic reaction, bleeding w/possible need for blood transfusion, infection, renal and vascular compromise, limb damage, arrhythmia, stroke, vessel dissection/perforation, Myocardial infarction, emergent CABG. Informed consent obtained and in chart.

## 2020-06-30 NOTE — H&P ADULT - NSICDXFAMILYHX_GEN_ALL_CORE_FT
FAMILY HISTORY:  Family history of ischemic heart disease, Family history of MI (myocardial infarction) FAMILY HISTORY:  Family history of ischemic heart disease, Family history of MI (myocardial infarction) in Father and Brother

## 2020-06-30 NOTE — H&P ADULT - NSICDXPASTMEDICALHX_GEN_ALL_CORE_FT
PAST MEDICAL HISTORY:  Aortic valve disorder Aortic stenosis    CAD (coronary artery disease)     Calculus of kidney Kidney stones    Essential hypertension Hypertension    Hyperlipidemia Hyperlipidemia    Malignant melanoma of skin Melanoma PAST MEDICAL HISTORY:  Antiphospholipid syndrome     Aortic valve disorder Aortic stenosis    CAD (coronary artery disease)     Calculus of kidney Kidney stones    Cushing's disease     Essential hypertension     Hyperlipidemia     Malignant melanoma of skin Melanoma on foot s/p surgical removal

## 2020-06-30 NOTE — H&P ADULT - HISTORY OF PRESENT ILLNESS
COVID NEGATIVE 06/30/2020, results in [facility]     **SKELETON**  **VERIFY MEDS**    Patient is a 76 y/o M with PMHx of CAD s/p CABG x 2 (04/12/15 at time of AVR), aortic stenosis s/p bioprosthetic AVR (04/12/2015 at [facility]), aortic atherosclerosis HTN, Cushing Syndrome, and antiphospholipid syndrome presented to his cardiologist, Dr. Finkelstein for routine follow-up. Pt recently underwent pituitary surgery on 05/16/2020 for dx of Cushing's disease likely 2/2 to corticotrophin (ACTH)-secreting pituitary tumor. He was admited for 5 days post operatively for cardiac observation as he had a 16 beat run of NSVT during post-operative course. The run was asymptomatic. Patient endorses feeling well after discharge. However, on 06/11/2020, patient had a 3.6 pause during bowel movement on MCT monitor. Patient denies CP, SOB, diaphoresis, n/v/d, fever, chills, syncope, dizziness, or palpitations.  CCTA 06/16/2020: SVG-distal RCA patent, SVG-OM occluded, LAD pro severe, mid moderate, D1 moderate, D2 mild, LCx mild, OM1 severe, LPL normal, RCA prox severe, mid occluded, distal with patent graft.  ECHO 05/22/2020: EF: 67%, normally functioning bioprosthetic aVR, mild LVH, dilated LA. Exercise Stress Test 05/22/2020: Mild Mr, s/p bioprosthetic AVR, normal wall motion PA pressures and ECC post 10.1 METS. EF: 63%.     CABG x 2 at time of AVR 04/25/15, SVG-LCx, SVG-RCA, Cass Ferguson MD at [facility]; s/p 03/09/2020: exercise stress echo: EF 63%, mild MR, s/p bioprosthetic AVR, normal wall motion, PA pressures, and ECG post 10.1 METs    In light of patient's risk factors and abnormal CCTA on 06/16/2020, patient presents for cardiac catheterization with possible intervention if clinically indicated. COVID NEGATIVE 06/30/2020, results in [facility]     Patient is a 76 y/o M with PMHx of CAD s/p CABG x 2 (04/12/15 at time of AVR), aortic stenosis s/p bioprosthetic AVR (04/12/2015 at [facility]), aortic atherosclerosis HTN, Cushing Syndrome, and antiphospholipid syndrome presented to his cardiologist, Dr. Finkelstein endorsing 2 episodes of "crushing" chest pain described as a 5-10 lb weight on his chest over the past 3 weeks. Patient also admits that he use to walk 1-4 miles a day and is now experiencing SOB while walking uphill or if talking/walking at the same time that is new for him. 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 Patient denies diaphoresis, n/v/d, fever, chills, syncope, dizziness, or palpitations.  CCTA 06/16/2020: SVG-distal RCA patent, SVG-OM occluded, LAD pro severe, mid moderate, D1 moderate, D2 mild, LCx mild, OM1 severe, LPL normal, RCA prox severe, mid occluded, distal with patent graft.  ECHO 05/22/2020: EF: 67%, normally functioning bioprosthetic aVR, mild LVH, dilated LA. Exercise Stress Test 05/22/2020: Mild Mr, s/p bioprosthetic AVR, normal wall motion PA pressures and ECC post 10.1 METS. EF: 63%.     CABG x 2 at time of AVR 04/25/15, SVG-LCx, SVG-RCA, Cass Ferguson MD at [facility]; s/p 03/09/2020: exercise stress echo: EF 63%, mild MR, s/p bioprosthetic AVR, normal wall motion, PA pressures, and ECG post 10.1 METs    In light of patient's risk factors and abnormal CCTA on 06/16/2020, patient presents for cardiac catheterization with possible intervention if clinically indicated.

## 2020-06-30 NOTE — H&P ADULT - NSHPSOCIALHISTORY_GEN_ALL_CORE
ETOH:   Smoking:   Illicit drugs: ETOH: Denies   Smoking: Former Smoker, quit 40 years ago  Illicit drugs: Current marijuana smoker x 15 years

## 2020-07-02 ENCOUNTER — TRANSCRIPTION ENCOUNTER (OUTPATIENT)
Age: 75
End: 2020-07-02

## 2020-07-02 ENCOUNTER — INPATIENT (INPATIENT)
Facility: HOSPITAL | Age: 75
LOS: 0 days | Discharge: HOME | DRG: 247 | End: 2020-07-03
Attending: HOSPITALIST | Admitting: HOSPITALIST
Payer: MEDICARE

## 2020-07-02 DIAGNOSIS — Z90.49 ACQUIRED ABSENCE OF OTHER SPECIFIED PARTS OF DIGESTIVE TRACT: Chronic | ICD-10-CM

## 2020-07-02 DIAGNOSIS — Z95.2 PRESENCE OF PROSTHETIC HEART VALVE: Chronic | ICD-10-CM

## 2020-07-02 DIAGNOSIS — Z98.890 OTHER SPECIFIED POSTPROCEDURAL STATES: Chronic | ICD-10-CM

## 2020-07-02 LAB
A1C WITH ESTIMATED AVERAGE GLUCOSE RESULT: 5.3 % — SIGNIFICANT CHANGE UP (ref 4–5.6)
ALBUMIN SERPL ELPH-MCNC: 4.2 G/DL — SIGNIFICANT CHANGE UP (ref 3.3–5)
ALP SERPL-CCNC: 60 U/L — SIGNIFICANT CHANGE UP (ref 40–120)
ALT FLD-CCNC: 27 U/L — SIGNIFICANT CHANGE UP (ref 10–45)
ANION GAP SERPL CALC-SCNC: 13 MMOL/L — SIGNIFICANT CHANGE UP (ref 5–17)
APTT BLD: 71.1 SEC — HIGH (ref 27.5–35.5)
AST SERPL-CCNC: 21 U/L — SIGNIFICANT CHANGE UP (ref 10–40)
BASOPHILS # BLD AUTO: 0.03 K/UL — SIGNIFICANT CHANGE UP (ref 0–0.2)
BASOPHILS NFR BLD AUTO: 0.5 % — SIGNIFICANT CHANGE UP (ref 0–2)
BILIRUB SERPL-MCNC: 1.5 MG/DL — HIGH (ref 0.2–1.2)
BUN SERPL-MCNC: 13 MG/DL — SIGNIFICANT CHANGE UP (ref 7–23)
CALCIUM SERPL-MCNC: 9.6 MG/DL — SIGNIFICANT CHANGE UP (ref 8.4–10.5)
CHLORIDE SERPL-SCNC: 103 MMOL/L — SIGNIFICANT CHANGE UP (ref 96–108)
CHOLEST SERPL-MCNC: 85 MG/DL — SIGNIFICANT CHANGE UP (ref 10–199)
CK MB CFR SERPL CALC: 1.7 NG/ML — SIGNIFICANT CHANGE UP (ref 0–6.7)
CK SERPL-CCNC: 64 U/L — SIGNIFICANT CHANGE UP (ref 30–200)
CO2 SERPL-SCNC: 27 MMOL/L — SIGNIFICANT CHANGE UP (ref 22–31)
CREAT SERPL-MCNC: 0.72 MG/DL — SIGNIFICANT CHANGE UP (ref 0.5–1.3)
EOSINOPHIL # BLD AUTO: 0.13 K/UL — SIGNIFICANT CHANGE UP (ref 0–0.5)
EOSINOPHIL NFR BLD AUTO: 2.1 % — SIGNIFICANT CHANGE UP (ref 0–6)
ESTIMATED AVERAGE GLUCOSE: 105 MG/DL — SIGNIFICANT CHANGE UP (ref 68–114)
GLUCOSE SERPL-MCNC: 107 MG/DL — HIGH (ref 70–99)
HCT VFR BLD CALC: 39.8 % — SIGNIFICANT CHANGE UP (ref 39–50)
HDLC SERPL-MCNC: 41 MG/DL — SIGNIFICANT CHANGE UP
HGB BLD-MCNC: 13.9 G/DL — SIGNIFICANT CHANGE UP (ref 13–17)
IMM GRANULOCYTES NFR BLD AUTO: 0.2 % — SIGNIFICANT CHANGE UP (ref 0–1.5)
INR BLD: 1.29 — HIGH (ref 0.88–1.16)
LIPID PNL WITH DIRECT LDL SERPL: 29 MG/DL — SIGNIFICANT CHANGE UP
LYMPHOCYTES # BLD AUTO: 0.88 K/UL — LOW (ref 1–3.3)
LYMPHOCYTES # BLD AUTO: 14 % — SIGNIFICANT CHANGE UP (ref 13–44)
MCHC RBC-ENTMCNC: 31.3 PG — SIGNIFICANT CHANGE UP (ref 27–34)
MCHC RBC-ENTMCNC: 34.9 GM/DL — SIGNIFICANT CHANGE UP (ref 32–36)
MCV RBC AUTO: 89.6 FL — SIGNIFICANT CHANGE UP (ref 80–100)
MONOCYTES # BLD AUTO: 0.93 K/UL — HIGH (ref 0–0.9)
MONOCYTES NFR BLD AUTO: 14.8 % — HIGH (ref 2–14)
NEUTROPHILS # BLD AUTO: 4.3 K/UL — SIGNIFICANT CHANGE UP (ref 1.8–7.4)
NEUTROPHILS NFR BLD AUTO: 68.4 % — SIGNIFICANT CHANGE UP (ref 43–77)
NRBC # BLD: 0 /100 WBCS — SIGNIFICANT CHANGE UP (ref 0–0)
PLATELET # BLD AUTO: 195 K/UL — SIGNIFICANT CHANGE UP (ref 150–400)
POTASSIUM SERPL-MCNC: 3.5 MMOL/L — SIGNIFICANT CHANGE UP (ref 3.5–5.3)
POTASSIUM SERPL-SCNC: 3.5 MMOL/L — SIGNIFICANT CHANGE UP (ref 3.5–5.3)
PROT SERPL-MCNC: 7.1 G/DL — SIGNIFICANT CHANGE UP (ref 6–8.3)
PROTHROM AB SERPL-ACNC: 15.3 SEC — HIGH (ref 10.6–13.6)
RBC # BLD: 4.44 M/UL — SIGNIFICANT CHANGE UP (ref 4.2–5.8)
RBC # FLD: 13.2 % — SIGNIFICANT CHANGE UP (ref 10.3–14.5)
SODIUM SERPL-SCNC: 143 MMOL/L — SIGNIFICANT CHANGE UP (ref 135–145)
TOTAL CHOLESTEROL/HDL RATIO MEASUREMENT: 2.1 RATIO — LOW (ref 3.4–9.6)
TRIGL SERPL-MCNC: 73 MG/DL — SIGNIFICANT CHANGE UP (ref 10–149)
WBC # BLD: 6.28 K/UL — SIGNIFICANT CHANGE UP (ref 3.8–10.5)
WBC # FLD AUTO: 6.28 K/UL — SIGNIFICANT CHANGE UP (ref 3.8–10.5)

## 2020-07-02 PROCEDURE — 93228 REMOTE 30 DAY ECG REV/REPORT: CPT

## 2020-07-02 PROCEDURE — 93455 CORONARY ART/GRFT ANGIO S&I: CPT | Mod: 26,59

## 2020-07-02 PROCEDURE — 93571 IV DOP VEL&/PRESS C FLO 1ST: CPT | Mod: 26,LD

## 2020-07-02 PROCEDURE — 92928 PRQ TCAT PLMT NTRAC ST 1 LES: CPT | Mod: LD

## 2020-07-02 RX ORDER — ASPIRIN/CALCIUM CARB/MAGNESIUM 324 MG
325 TABLET ORAL ONCE
Refills: 0 | Status: COMPLETED | OUTPATIENT
Start: 2020-07-02 | End: 2020-07-02

## 2020-07-02 RX ORDER — SODIUM CHLORIDE 9 MG/ML
500 INJECTION INTRAMUSCULAR; INTRAVENOUS; SUBCUTANEOUS
Refills: 0 | Status: DISCONTINUED | OUTPATIENT
Start: 2020-07-02 | End: 2020-07-02

## 2020-07-02 RX ORDER — AMLODIPINE BESYLATE 2.5 MG/1
10 TABLET ORAL ONCE
Refills: 0 | Status: COMPLETED | OUTPATIENT
Start: 2020-07-02 | End: 2020-07-02

## 2020-07-02 RX ORDER — CHLORHEXIDINE GLUCONATE 213 G/1000ML
1 SOLUTION TOPICAL ONCE
Refills: 0 | Status: DISCONTINUED | OUTPATIENT
Start: 2020-07-02 | End: 2020-07-02

## 2020-07-02 RX ORDER — SODIUM CHLORIDE 9 MG/ML
500 INJECTION INTRAMUSCULAR; INTRAVENOUS; SUBCUTANEOUS
Refills: 0 | Status: DISCONTINUED | OUTPATIENT
Start: 2020-07-02 | End: 2020-07-03

## 2020-07-02 RX ORDER — CLOPIDOGREL BISULFATE 75 MG/1
75 TABLET, FILM COATED ORAL DAILY
Refills: 0 | Status: DISCONTINUED | OUTPATIENT
Start: 2020-07-03 | End: 2020-07-03

## 2020-07-02 RX ORDER — LIDOCAINE HYDROCHLORIDE AND EPINEPHRINE 10; 10 MG/ML; UG/ML
3 INJECTION, SOLUTION INFILTRATION; PERINEURAL ONCE
Refills: 0 | Status: COMPLETED | OUTPATIENT
Start: 2020-07-02 | End: 2020-07-02

## 2020-07-02 RX ORDER — LIDOCAINE HYDROCHLORIDE AND EPINEPHRINE 10; 10 MG/ML; UG/ML
1.5 INJECTION, SOLUTION INFILTRATION; PERINEURAL ONCE
Refills: 0 | Status: COMPLETED | OUTPATIENT
Start: 2020-07-02 | End: 2020-07-02

## 2020-07-02 RX ORDER — ATORVASTATIN CALCIUM 80 MG/1
80 TABLET, FILM COATED ORAL ONCE
Refills: 0 | Status: COMPLETED | OUTPATIENT
Start: 2020-07-02 | End: 2020-07-02

## 2020-07-02 RX ORDER — ASPIRIN/CALCIUM CARB/MAGNESIUM 324 MG
81 TABLET ORAL DAILY
Refills: 0 | Status: DISCONTINUED | OUTPATIENT
Start: 2020-07-03 | End: 2020-07-03

## 2020-07-02 RX ORDER — CLOPIDOGREL BISULFATE 75 MG/1
600 TABLET, FILM COATED ORAL ONCE
Refills: 0 | Status: COMPLETED | OUTPATIENT
Start: 2020-07-02 | End: 2020-07-02

## 2020-07-02 RX ORDER — POTASSIUM CHLORIDE 20 MEQ
40 PACKET (EA) ORAL ONCE
Refills: 0 | Status: COMPLETED | OUTPATIENT
Start: 2020-07-02 | End: 2020-07-02

## 2020-07-02 RX ORDER — CLOPIDOGREL BISULFATE 75 MG/1
1 TABLET, FILM COATED ORAL
Qty: 30 | Refills: 11
Start: 2020-07-02 | End: 2021-06-26

## 2020-07-02 RX ADMIN — AMLODIPINE BESYLATE 10 MILLIGRAM(S): 2.5 TABLET ORAL at 23:11

## 2020-07-02 RX ADMIN — SODIUM CHLORIDE 75 MILLILITER(S): 9 INJECTION INTRAMUSCULAR; INTRAVENOUS; SUBCUTANEOUS at 08:10

## 2020-07-02 RX ADMIN — SODIUM CHLORIDE 75 MILLILITER(S): 9 INJECTION INTRAMUSCULAR; INTRAVENOUS; SUBCUTANEOUS at 16:15

## 2020-07-02 RX ADMIN — Medication 40 MILLIEQUIVALENT(S): at 18:00

## 2020-07-02 RX ADMIN — CLOPIDOGREL BISULFATE 600 MILLIGRAM(S): 75 TABLET, FILM COATED ORAL at 08:14

## 2020-07-02 RX ADMIN — ATORVASTATIN CALCIUM 80 MILLIGRAM(S): 80 TABLET, FILM COATED ORAL at 23:11

## 2020-07-02 RX ADMIN — LIDOCAINE HYDROCHLORIDE AND EPINEPHRINE 3 MILLILITER(S): 10; 10 INJECTION, SOLUTION INFILTRATION; PERINEURAL at 18:33

## 2020-07-02 RX ADMIN — Medication 325 MILLIGRAM(S): at 08:15

## 2020-07-02 RX ADMIN — LIDOCAINE HYDROCHLORIDE AND EPINEPHRINE 1.5 MILLILITER(S): 10; 10 INJECTION, SOLUTION INFILTRATION; PERINEURAL at 14:10

## 2020-07-02 NOTE — DISCHARGE NOTE PROVIDER - HOSPITAL COURSE
Patient is a 74 y/o M with PMHx of CAD s/p CABG x 2 (04/12/15 at time of AVR), aortic stenosis s/p bioprosthetic AVR (04/12/2015 at [facility]), aortic atherosclerosis HTN, Cushing Syndrome, and antiphospholipid syndrome presented to his cardiologist, Dr. Finkelstein endorsing 2 episodes of "crushing" chest pain described as a 5-10 lb weight on his chest over the past 3 weeks. Patient also admits that he use to walk 1-4 miles a day and is now experiencing SOB while walking uphill or if talking/walking at the same time that is new for him. 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 Patient denies diaphoresis, n/v/d, fever, chills, syncope, dizziness, or palpitations.  CCTA 06/16/2020: SVG-distal RCA patent, SVG-OM occluded, LAD pro severe, mid moderate, D1 moderate, D2 mild, LCx mild, OM1 severe, LPL normal, RCA prox severe, mid occluded, distal with patent graft.  ECHO 05/22/2020: EF: 67%, normally functioning bioprosthetic aVR, mild LVH, dilated LA. Exercise Stress Test 05/22/2020: Mild Mr, s/p bioprosthetic AVR, normal wall motion PA pressures and ECC post 10.1 METS. EF: 63%. CABG x 2 at time of AVR 04/25/15, SVG-LCx, SVG-RCA, Cass Ferguson MD at [facility]; s/p 03/09/2020: exercise stress echo: EF 63%, mild MR, s/p bioprosthetic AVR, normal wall motion, PA pressures, and ECG post 10.1 METs. In light of patient's risk factors and abnormal CCTA on 06/16/2020, patient presents for cardiac catheterization with possible intervention if clinically indicated.         Pt now s/p cardiac catheterization on 07/02/2020 with SANGEETA .... Right groin failed perclose, successful angioseal. EF 63%. Pt to continue Aspirin 81mg daily and to start on Plavix 75mg daily. Pt to continue Crestor 40mg daily at bedtime for cholesterol control. Patient is a 74 y/o M with PMHx of CAD s/p CABG x 2 (04/12/15 at time of AVR), aortic stenosis s/p bioprosthetic AVR (04/12/2015 at [facility]), aortic atherosclerosis HTN, Cushing Syndrome, and antiphospholipid syndrome presented to his cardiologist, Dr. Finkelstein endorsing 2 episodes of "crushing" chest pain described as a 5-10 lb weight on his chest over the past 3 weeks. Patient also admits that he use to walk 1-4 miles a day and is now experiencing SOB while walking uphill or if talking/walking at the same time that is new for him. 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 Patient denies diaphoresis, n/v/d, fever, chills, syncope, dizziness, or palpitations.  CCTA 06/16/2020: SVG-distal RCA patent, SVG-OM occluded, LAD pro severe, mid moderate, D1 moderate, D2 mild, LCx mild, OM1 severe, LPL normal, RCA prox severe, mid occluded, distal with patent graft.  ECHO 05/22/2020: EF: 67%, normally functioning bioprosthetic aVR, mild LVH, dilated LA. Exercise Stress Test 05/22/2020: Mild Mr, s/p bioprosthetic AVR, normal wall motion PA pressures and ECC post 10.1 METS. EF: 63%. CABG x 2 at time of AVR 04/25/15, SVG-LCx, SVG-RCA, Cass Ferguson MD at [facility]; s/p 03/09/2020: exercise stress echo: EF 63%, mild MR, s/p bioprosthetic AVR, normal wall motion, PA pressures, and ECG post 10.1 METs. In light of patient's risk factors and abnormal CCTA on 06/16/2020, patient presents for cardiac catheterization with possible intervention if clinically indicated. Pt now s/p cardiac catheterization on 07/02/2020 with FFR (0.73)/SANGEETA x 1 prox LAD (70%), FFR (0.6)/SANGEETA x 1 mid LAD (70%), SANGEETA OM1 (80-90%). SVG-OM1 occluded. SVG to RPDA patent. Native proxRCA 100% occluded. EF 63%. Right groin failed PC, successful angioseal. Pt received 240cc contrast dye. IV fluids 75cc/hr x 10 hours. Pt to continue Aspirin 81mg daily and to start on Plavix 75mg daily. Pt to continue Crestor 40mg daily at bedtime for cholesterol control. Patient is a 76 y/o M with PMHx of CAD s/p CABG x 2 (04/12/15 at time of AVR), aortic stenosis s/p bioprosthetic AVR (04/12/2015 at [facility]), aortic atherosclerosis HTN, Cushing Syndrome, and antiphospholipid syndrome presented to his cardiologist, Dr. Finkelstein endorsing 2 episodes of "crushing" chest pain described as a 5-10 lb weight on his chest over the past 3 weeks. Patient also admits that he use to walk 1-4 miles a day and is now experiencing SOB while walking uphill or if talking/walking at the same time that is new for him. 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 Patient denies diaphoresis, n/v/d, fever, chills, syncope, dizziness, or palpitations.  CCTA 06/16/2020: SVG-distal RCA patent, SVG-OM occluded, LAD pro severe, mid moderate, D1 moderate, D2 mild, LCx mild, OM1 severe, LPL normal, RCA prox severe, mid occluded, distal with patent graft.  ECHO 05/22/2020: EF: 67%, normally functioning bioprosthetic aVR, mild LVH, dilated LA. Exercise Stress Test 05/22/2020: Mild Mr, s/p bioprosthetic AVR, normal wall motion PA pressures and ECC post 10.1 METS. EF: 63%. CABG x 2 at time of AVR 04/25/15, SVG-LCx, SVG-RCA, Cass Ferguson MD at [facility]; s/p 03/09/2020: exercise stress echo: EF 63%, mild MR, s/p bioprosthetic AVR, normal wall motion, PA pressures, and ECG post 10.1 METs. In light of patient's risk factors and abnormal CCTA on 06/16/2020, patient presents for cardiac catheterization with possible intervention if clinically indicated. Pt now s/p cardiac catheterization on 07/02/2020 with FFR (0.73)/SANGEETA x 1 prox LAD (70%), FFR (0.6)/SANGEETA x 1 mid LAD (70%), SANGEETA OM1 (80-90%). SVG-OM1 occluded. SVG to RPDA patent. Native proxRCA 100% occluded. EF 63%. Right groin failed PC, successful angioseal. Pt received 240cc contrast dye. IV fluids 75cc/hr x 10 hours. Pt to continue Aspirin 81mg daily and to start on Plavix 75mg daily. Pt to continue Crestor 40mg daily at bedtime for cholesterol control. Pt has sinus bradycardia in 40s-50s and is therefore not a candidate for a beta blocker. Patient is a 76 y/o M with PMHx of CAD s/p CABG x 2 (04/12/15 at time of AVR), aortic stenosis s/p bioprosthetic AVR (04/12/2015 at [facility]), aortic atherosclerosis HTN, Cushing Syndrome, and antiphospholipid syndrome presented to his cardiologist, Dr. Finkelstein endorsing 2 episodes of "crushing" chest pain described as a 5-10 lb weight on his chest over the past 3 weeks. Patient also admits that he use to walk 1-4 miles a day and is now experiencing SOB while walking uphill or if talking/walking at the same time that is new for him. 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 Patient denies diaphoresis, n/v/d, fever, chills, syncope, dizziness, or palpitations.  CCTA 06/16/2020: SVG-distal RCA patent, SVG-OM occluded, LAD pro severe, mid moderate, D1 moderate, D2 mild, LCx mild, OM1 severe, LPL normal, RCA prox severe, mid occluded, distal with patent graft.  ECHO 05/22/2020: EF: 67%, normally functioning bioprosthetic aVR, mild LVH, dilated LA. Exercise Stress Test 05/22/2020: Mild Mr, s/p bioprosthetic AVR, normal wall motion PA pressures and ECC post 10.1 METS. EF: 63%. CABG x 2 at time of AVR 04/25/15, SVG-LCx, SVG-RCA, Cass Ferguson MD at [facility]; s/p 03/09/2020: exercise stress echo: EF 63%, mild MR, s/p bioprosthetic AVR, normal wall motion, PA pressures, and ECG post 10.1 METs. In light of patient's risk factors and abnormal CCTA on 06/16/2020, patient presents for cardiac catheterization with possible intervention if clinically indicated. Pt now s/p cardiac catheterization on 07/02/2020 with FFR (0.73)/SANGEETA x 1 prox LAD (70%), FFR (0.6)/SANGEETA x 1 mid LAD (70%), SANGEETA OM1 (80-90%). SVG-OM1 occluded. SVG to RPDA patent. Native proxRCA 100% occluded. EF 63%. Right groin failed PC, successful angioseal. Pt received 240cc contrast dye. IV fluids 75cc/hr x 10 hours. Pt to continue Aspirin 81mg daily and to start on Plavix 75mg daily. Pt to continue Crestor 40mg daily at bedtime for cholesterol control. Pt has sinus bradycardia in 40s-50s and is therefore not a candidate for a beta blocker. Patient stable for discharge per Dr. Wall, all prescriptions sent to patient's outpatient pharmacy. Patient is a 76 y/o M with PMHx of CAD s/p CABG x 2 (04/12/15 at time of AVR), aortic stenosis s/p bioprosthetic AVR (04/12/2015 at [facility]), aortic atherosclerosis HTN, Cushing Syndrome, and antiphospholipid syndrome presented to his cardiologist, Dr. Finkelstein endorsing 2 episodes of "crushing" chest pain described as a 5-10 lb weight on his chest over the past 3 weeks. Patient also admits that he use to walk 1-4 miles a day and is now experiencing SOB while walking uphill or if talking/walking at the same time that is new for him. 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 Patient denies diaphoresis, n/v/d, fever, chills, syncope, dizziness, or palpitations.  CCTA 06/16/2020: SVG-distal RCA patent, SVG-OM occluded, LAD pro severe, mid moderate, D1 moderate, D2 mild, LCx mild, OM1 severe, LPL normal, RCA prox severe, mid occluded, distal with patent graft.  ECHO 05/22/2020: EF: 67%, normally functioning bioprosthetic aVR, mild LVH, dilated LA. Exercise Stress Test 05/22/2020: Mild Mr, s/p bioprosthetic AVR, normal wall motion PA pressures and ECC post 10.1 METS. EF: 63%. CABG x 2 at time of AVR 04/25/15, SVG-LCx, SVG-RCA, Cass Ferguson MD at [facility]; s/p 03/09/2020: exercise stress echo: EF 63%, mild MR, s/p bioprosthetic AVR, normal wall motion, PA pressures, and ECG post 10.1 METs. In light of patient's risk factors and abnormal CCTA on 06/16/2020, patient presents for cardiac catheterization with possible intervention if clinically indicated. Pt now s/p cardiac catheterization on 07/02/2020 with FFR (0.73)/SANGEETA x 1 prox LAD (70%), FFR (0.6)/SANGEETA x 1 mid LAD (70%), SANGEETA OM1 (80-90%). SVG-OM1 occluded. SVG to RPDA patent. Native proxRCA 100% occluded. EF 63%. Right groin failed PC, successful angioseal. Pt received 240cc contrast dye. IV fluids 75cc/hr x 10 hours. Pt to continue Aspirin 81mg daily and to start on Plavix 75mg daily. Pt to continue Crestor 40mg daily at bedtime for cholesterol control. Pt has sinus bradycardia in 40s-50s and is therefore not a candidate for a beta blocker. Overnight on tele patient with five 2.6 sec pauses, patient was asleep at time, and VS remained stable, Dr. Wall made aware who spoke with patient's outpatient cardiologist stated he is aware of pauses, and is followed with Dr. Murcia as an outpatient and patient is okay for discharge, patient made aware to follow up with Dr. Finkelstein within one week of discharge. Patient also with ooze at right groin, site was epi/lido'd, and compressed with quick clot with resolution of ooze. Patient is deemed stable for discharge per Dr. Wall, all medications were sent to outpatient pharmacy. Patient is a 74 y/o M with PMHx of CAD s/p CABG x 2 (04/12/15 at time of AVR), aortic stenosis s/p bioprosthetic AVR (04/12/2015 at [facility]), aortic atherosclerosis HTN, Cushing Syndrome, and antiphospholipid syndrome presented to his cardiologist, Dr. Finkelstein endorsing 2 episodes of "crushing" chest pain described as a 5-10 lb weight on his chest over the past 3 weeks. Patient also admits that he use to walk 1-4 miles a day and is now experiencing SOB while walking uphill or if talking/walking at the same time that is new for him. 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 Patient denies diaphoresis, n/v/d, fever, chills, syncope, dizziness, or palpitations.  CCTA 06/16/2020: SVG-distal RCA patent, SVG-OM occluded, LAD pro severe, mid moderate, D1 moderate, D2 mild, LCx mild, OM1 severe, LPL normal, RCA prox severe, mid occluded, distal with patent graft.  ECHO 05/22/2020: EF: 67%, normally functioning bioprosthetic aVR, mild LVH, dilated LA. Exercise Stress Test 05/22/2020: Mild Mr, s/p bioprosthetic AVR, normal wall motion PA pressures and ECC post 10.1 METS. EF: 63%. CABG x 2 at time of AVR 04/25/15, SVG-LCx, SVG-RCA, Cass Ferguson MD at [facility]; s/p 03/09/2020: exercise stress echo: EF 63%, mild MR, s/p bioprosthetic AVR, normal wall motion, PA pressures, and ECG post 10.1 METs. In light of patient's risk factors and abnormal CCTA on 06/16/2020, patient presents for cardiac catheterization with possible intervention if clinically indicated. Pt now s/p cardiac catheterization on 07/02/2020 with FFR (0.73)/SANGEETA x 1 prox LAD (70%), FFR (0.6)/SANGEETA x 1 mid LAD (70%), SANGEETA OM1 (80-90%). SVG-OM1 occluded. SVG to RPDA patent. Native proxRCA 100% occluded. EF 63%. Right groin failed PC, successful angioseal. Pt received 240cc contrast dye. IV fluids 75cc/hr x 10 hours. Pt to continue Aspirin 81mg daily and to start on Plavix 75mg daily. Pt to continue Crestor 40mg daily at bedtime for cholesterol control. Pt has sinus bradycardia in 40s-50s and is therefore not a candidate for a beta blocker. Overnight on tele patient with five 2.6 sec pauses, patient was asleep at time, and VS remained stable, Dr. Wall made aware who spoke with patient's outpatient cardiologist stated he is aware of pauses, and is followed with Dr. Murcia as an outpatient and patient is okay for discharge, patient made aware to follow up with Dr. Finkelstein within one week of discharge. Patient ambulated prior to discharge but developed with ooze at right groin, site was epi/lido'd, and compressed with quick clot with resolution of ooze.  He is being observed and deemed not suitable for early discharge until access site is stable. Patient is a 74 y/o M with PMHx of CAD s/p CABG x 2 (04/12/15 at time of AVR), aortic stenosis s/p bioprosthetic AVR (04/12/2015 at [facility]), aortic atherosclerosis HTN, Cushing Syndrome, and antiphospholipid syndrome presented to his cardiologist, Dr. Finkelstein endorsing 2 episodes of "crushing" chest pain described as a 5-10 lb weight on his chest over the past 3 weeks. Patient also admits that he use to walk 1-4 miles a day and is now experiencing SOB while walking uphill or if talking/walking at the same time that is new for him. 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 Patient denies diaphoresis, n/v/d, fever, chills, syncope, dizziness, or palpitations.  CCTA 06/16/2020: SVG-distal RCA patent, SVG-OM occluded, LAD pro severe, mid moderate, D1 moderate, D2 mild, LCx mild, OM1 severe, LPL normal, RCA prox severe, mid occluded, distal with patent graft.  ECHO 05/22/2020: EF: 67%, normally functioning bioprosthetic aVR, mild LVH, dilated LA. Exercise Stress Test 05/22/2020: Mild Mr, s/p bioprosthetic AVR, normal wall motion PA pressures and ECC post 10.1 METS. EF: 63%. CABG x 2 at time of AVR 04/25/15, SVG-LCx, SVG-RCA, Cass Ferguson MD at [facility]; s/p 03/09/2020: exercise stress echo: EF 63%, mild MR, s/p bioprosthetic AVR, normal wall motion, PA pressures, and ECG post 10.1 METs. In light of patient's risk factors and abnormal CCTA on 06/16/2020, patient presents for cardiac catheterization with possible intervention if clinically indicated. Pt now s/p cardiac catheterization on 07/02/2020 with FFR (0.73)/SANGEETA x 1 prox LAD (70%), FFR (0.6)/SANGEETA x 1 mid LAD (70%), SANGEETA OM1 (80-90%). SVG-OM1 occluded. SVG to RPDA patent. Native proxRCA 100% occluded. EF 63%. Right groin failed PC, successful angioseal. Pt received 240cc contrast dye. IV fluids 75cc/hr x 10 hours. Pt to continue Aspirin 81mg daily and to start on Plavix 75mg daily. Pt to continue Crestor 40mg daily at bedtime for cholesterol control. Pt has sinus bradycardia in 40s-50s and is therefore not a candidate for a beta blocker. Overnight on tele patient with five 2.6 sec pauses, patient was asleep at time, and VS remained stable, Dr. Wall made aware who spoke with patient's outpatient cardiologist stated he is aware of pauses, and is followed with Dr. Murcia as an outpatient and patient is okay for discharge, patient made aware to follow up with Dr. Finkelstein within one week of discharge. Patient ambulated prior to discharge but developed with ooze at right groin, site was epi/lido'd, and compressed with quick clot with resolution of ooze.  He was observed and deemed not suitable for early discharge but was monitored during the day and ambulated around 515pm and was evaluated by multiple providers including Dr Wall, Dr Avendaño, Dr Ventura, and Jenna Mott PAC. Pt ambulated and was evaluated again by Dr Avendaño and Jenna Mott PAC and after discussing with Dr Acosta pt deemed stable for discharge. Plavix 75mg daily was eprescribed to pt's pharmacy and patient' wife states she picked up the medicine and the patient will take it at home. Pt will continue taking Crestor 40mg daily for cholesterol control. Patient is a 74 y/o M with PMHx of CAD s/p CABG x 2 (04/12/15 at time of AVR), aortic stenosis s/p bioprosthetic AVR (04/12/2015 at [facility]), aortic atherosclerosis HTN, Cushing Syndrome, and antiphospholipid syndrome presented to his cardiologist, Dr. Finkelstein endorsing 2 episodes of "crushing" chest pain described as a 5-10 lb weight on his chest over the past 3 weeks. Patient also admits that he use to walk 1-4 miles a day and is now experiencing SOB while walking uphill or if talking/walking at the same time that is new for him. 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 Patient denies diaphoresis, n/v/d, fever, chills, syncope, dizziness, or palpitations.  CCTA 06/16/2020: SVG-distal RCA patent, SVG-OM occluded, LAD pro severe, mid moderate, D1 moderate, D2 mild, LCx mild, OM1 severe, LPL normal, RCA prox severe, mid occluded, distal with patent graft.  ECHO 05/22/2020: EF: 67%, normally functioning bioprosthetic aVR, mild LVH, dilated LA. Exercise Stress Test 05/22/2020: Mild Mr, s/p bioprosthetic AVR, normal wall motion PA pressures and ECC post 10.1 METS. EF: 63%. CABG x 2 at time of AVR 04/25/15, SVG-LCx, SVG-RCA, Cass Ferguson MD at [facility]; s/p 03/09/2020: exercise stress echo: EF 63%, mild MR, s/p bioprosthetic AVR, normal wall motion, PA pressures, and ECG post 10.1 METs. In light of patient's risk factors and abnormal CCTA on 06/16/2020, patient presents for cardiac catheterization with possible intervention if clinically indicated. Pt now s/p cardiac catheterization on 07/02/2020 with FFR (0.73)/SANGEETA x 1 prox LAD (70%), FFR (0.6)/SANGEETA x 1 mid LAD (70%), ASNGEETA OM1 (80-90%). SVG-OM1 occluded. SVG to RPDA patent. Native proxRCA 100% occluded. EF 63%. Right groin failed PC, successful angioseal. Pt received 240cc contrast dye. IV fluids 75cc/hr x 10 hours. Pt to continue Aspirin 81mg daily and to start on Plavix 75mg daily. Pt to continue Crestor 40mg daily at bedtime for cholesterol control. Pt has sinus bradycardia in 40s-50s and is therefore not a candidate for a beta blocker. Overnight on tele patient with five 2.6 sec pauses, patient was asleep at time, and VS remained stable, Dr. Wall made aware who spoke with patient's outpatient cardiologist stated he is aware of pauses, and is followed with Dr. Murcia as an outpatient and patient is okay for discharge, patient made aware to follow up with Dr. Finkelstein within one week of discharge. Patient ambulated prior to discharge but developed with ooze at right groin, site was epi/lido'd, and compressed with quick clot with resolution of ooze.  He was observed and deemed not suitable for early discharge. Manual compression was held by multiple providers throughout the day, and patient needed a Femstop for a portion of the day as well. Bedside Ultrasound was negative for pseudoaneurysm but did show a small hematoma which was compressed. Pt was monitored during the day and ambulated around 515pm and was evaluated by multiple providers including Dr Wall, Dr Avendaño, Dr Ventura, and Jenna Mott PAC. Pt ambulated and was evaluated again by Dr Avendaño and Jenna Mott PAC and after discussing with Dr Acosta pt deemed stable for discharge. Pt will continue to take Aspirin 81mg daily. In addition Plavix 75mg daily was eprescribed to pt's pharmacy and patient' wife states she picked up the medicine and the patient will take it at home. Pt will continue taking Crestor 40mg daily for cholesterol control.

## 2020-07-02 NOTE — DISCHARGE NOTE PROVIDER - CARE PROVIDER_API CALL
Nicholas Martinez  Cardiology  [address] [address]  Phone: [phone]  Fax: [phone]  Follow Up Time:

## 2020-07-02 NOTE — DISCHARGE NOTE PROVIDER - NSDCFUSCHEDAPPT_GEN_ALL_CORE_FT
KRISTIN ACEVES ; 08/03/2020 ; [facility] [address] KRISTIN ACEVES ; 08/03/2020 ; \[facility]\"" [facility] [address] KRISTIN ACEVES ; 08/03/2020 ; [facility] [address] KRISTIN ACEVES ; 08/03/2020 ; [facility] [address] KRISTIN ACEVES ; 08/03/2020 ; [facility] [address] KRISTIN ACEVES ; 08/03/2020 ; [facility] [address] KRISTIN ACEVES ; 08/03/2020 ; [facility] [address] KRISTIN ACEVES ; 08/03/2020 ; [facility] [address]

## 2020-07-02 NOTE — DISCHARGE NOTE PROVIDER - NSDCCPCAREPLAN_GEN_ALL_CORE_FT
PRINCIPAL DISCHARGE DIAGNOSIS  Diagnosis: Coronary artery disease  Assessment and Plan of Treatment: You have blockages in the blood vessels that give blood and oxygen to your heart. This is called CORONARY ARTERY DISEASE. You underwetn a cardiac catheterization procedure and received a drug eluting stent.      SECONDARY DISCHARGE DIAGNOSES  Diagnosis: Hyperlipidemia  Assessment and Plan of Treatment:     Diagnosis: Hypertension  Assessment and Plan of Treatment: PRINCIPAL DISCHARGE DIAGNOSIS  Diagnosis: Coronary artery disease  Assessment and Plan of Treatment: You have blockages in the blood vessels that give blood and oxygen to your heart. This is called CORONARY ARTERY DISEASE. You underwent a cardiac catheterization procedure and received THREE drug eluting stents to your proximal left anterior descending, mid left anterior descending, and your 1st obtuse marginal coronary arteries. It is VERY IMPORTANT that you continue ASPIRIN 81mg daily and START taking PLAVIX (CLOPIDOGREL) 75mg daily EVERY SINGLE DAY to avoid blood clots forming in your stents that could give you a heart attack. Right groin wound care: do not lift anything heavier than 5 pounds for 5 days. Observe for signs of bleeding including bleeding/sudden swelling to your right groin site, new/worsening back pain, or numbness/tingling/pain/coolness to your right leg/foot/toes. If you experience these symptoms call your doctor and go to the nearest ED immediately. Follow up with Dr Nicholas Nguyen in 1-2 weeks.      SECONDARY DISCHARGE DIAGNOSES  Diagnosis: Hyperlipidemia  Assessment and Plan of Treatment: Continue taking CRESTOR (ROSUVSTATIN) 40mg daily at bedtime for cholesterol control    Diagnosis: Hypertension  Assessment and Plan of Treatment: Continue taking AMLODIPINE 10mg daily and LOSARTAN-HCTZ 100mg-12.5mg daily for blood pressure control.

## 2020-07-02 NOTE — DISCHARGE NOTE PROVIDER - NSDCMRMEDTOKEN_GEN_ALL_CORE_FT
amLODIPine 10 mg oral tablet:   aspirin 81 mg oral tablet: 1 tab(s) orally once a day  Colace: 200  orally once a day (at bedtime)  Crestor 40 mg oral tablet: 1 tab(s) orally once a day  losartan-hydrochlorothiazide 100mg-12.5mg oral tablet:   Valium 2 mg oral tablet: 1 tab(s) orally 3 times a day prn amLODIPine 10 mg oral tablet:   aspirin 81 mg oral tablet: 1 tab(s) orally once a day  clopidogrel 75 mg oral tablet: 1 tab(s) orally once a day  Colace: 200  orally once a day (at bedtime)  Crestor 40 mg oral tablet: 1 tab(s) orally once a day  losartan-hydrochlorothiazide 100mg-12.5mg oral tablet:   Valium 2 mg oral tablet: 1 tab(s) orally 3 times a day prn amLODIPine 10 mg oral tablet: 1 tab(s) orally once a day   aspirin 81 mg oral tablet: 1 tab(s) orally once a day  clopidogrel 75 mg oral tablet: 1 tab(s) orally once a day  Colace: 200  orally once a day (at bedtime)  Crestor 40 mg oral tablet: 1 tab(s) orally once a day  losartan-hydrochlorothiazide 100mg-12.5mg oral tablet:   Valium 2 mg oral tablet: 1 tab(s) orally 3 times a day prn

## 2020-07-03 ENCOUNTER — TRANSCRIPTION ENCOUNTER (OUTPATIENT)
Age: 75
End: 2020-07-03

## 2020-07-03 VITALS — TEMPERATURE: 98 F

## 2020-07-03 LAB
ALBUMIN SERPL ELPH-MCNC: 4 G/DL — SIGNIFICANT CHANGE UP (ref 3.3–5)
ALP SERPL-CCNC: 59 U/L — SIGNIFICANT CHANGE UP (ref 40–120)
ALT FLD-CCNC: 25 U/L — SIGNIFICANT CHANGE UP (ref 10–45)
ANION GAP SERPL CALC-SCNC: 13 MMOL/L — SIGNIFICANT CHANGE UP (ref 5–17)
APTT BLD: 78.3 SEC — HIGH (ref 27.5–35.5)
AST SERPL-CCNC: 23 U/L — SIGNIFICANT CHANGE UP (ref 10–40)
BILIRUB SERPL-MCNC: 1.7 MG/DL — HIGH (ref 0.2–1.2)
BUN SERPL-MCNC: 11 MG/DL — SIGNIFICANT CHANGE UP (ref 7–23)
CALCIUM SERPL-MCNC: 9.1 MG/DL — SIGNIFICANT CHANGE UP (ref 8.4–10.5)
CHLORIDE SERPL-SCNC: 104 MMOL/L — SIGNIFICANT CHANGE UP (ref 96–108)
CO2 SERPL-SCNC: 26 MMOL/L — SIGNIFICANT CHANGE UP (ref 22–31)
CREAT SERPL-MCNC: 0.76 MG/DL — SIGNIFICANT CHANGE UP (ref 0.5–1.3)
GLUCOSE SERPL-MCNC: 110 MG/DL — HIGH (ref 70–99)
HCT VFR BLD CALC: 37.9 % — LOW (ref 39–50)
HCT VFR BLD CALC: 38.4 % — LOW (ref 39–50)
HGB BLD-MCNC: 13.2 G/DL — SIGNIFICANT CHANGE UP (ref 13–17)
HGB BLD-MCNC: 13.5 G/DL — SIGNIFICANT CHANGE UP (ref 13–17)
INR BLD: 1.37 — HIGH (ref 0.88–1.16)
MAGNESIUM SERPL-MCNC: 1.9 MG/DL — SIGNIFICANT CHANGE UP (ref 1.6–2.6)
MCHC RBC-ENTMCNC: 31.1 PG — SIGNIFICANT CHANGE UP (ref 27–34)
MCHC RBC-ENTMCNC: 31.2 PG — SIGNIFICANT CHANGE UP (ref 27–34)
MCHC RBC-ENTMCNC: 34.8 GM/DL — SIGNIFICANT CHANGE UP (ref 32–36)
MCHC RBC-ENTMCNC: 35.2 GM/DL — SIGNIFICANT CHANGE UP (ref 32–36)
MCV RBC AUTO: 88.5 FL — SIGNIFICANT CHANGE UP (ref 80–100)
MCV RBC AUTO: 89.6 FL — SIGNIFICANT CHANGE UP (ref 80–100)
NRBC # BLD: 0 /100 WBCS — SIGNIFICANT CHANGE UP (ref 0–0)
NRBC # BLD: 0 /100 WBCS — SIGNIFICANT CHANGE UP (ref 0–0)
PLATELET # BLD AUTO: 199 K/UL — SIGNIFICANT CHANGE UP (ref 150–400)
PLATELET # BLD AUTO: 217 K/UL — SIGNIFICANT CHANGE UP (ref 150–400)
POTASSIUM SERPL-MCNC: 4.1 MMOL/L — SIGNIFICANT CHANGE UP (ref 3.5–5.3)
POTASSIUM SERPL-SCNC: 4.1 MMOL/L — SIGNIFICANT CHANGE UP (ref 3.5–5.3)
PROT SERPL-MCNC: 7.1 G/DL — SIGNIFICANT CHANGE UP (ref 6–8.3)
PROTHROM AB SERPL-ACNC: 16.2 SEC — HIGH (ref 10.6–13.6)
RBC # BLD: 4.23 M/UL — SIGNIFICANT CHANGE UP (ref 4.2–5.8)
RBC # BLD: 4.34 M/UL — SIGNIFICANT CHANGE UP (ref 4.2–5.8)
RBC # FLD: 13 % — SIGNIFICANT CHANGE UP (ref 10.3–14.5)
RBC # FLD: 13.2 % — SIGNIFICANT CHANGE UP (ref 10.3–14.5)
SODIUM SERPL-SCNC: 143 MMOL/L — SIGNIFICANT CHANGE UP (ref 135–145)
WBC # BLD: 8.35 K/UL — SIGNIFICANT CHANGE UP (ref 3.8–10.5)
WBC # BLD: 8.65 K/UL — SIGNIFICANT CHANGE UP (ref 3.8–10.5)
WBC # FLD AUTO: 8.35 K/UL — SIGNIFICANT CHANGE UP (ref 3.8–10.5)
WBC # FLD AUTO: 8.65 K/UL — SIGNIFICANT CHANGE UP (ref 3.8–10.5)

## 2020-07-03 PROCEDURE — 93926 LOWER EXTREMITY STUDY: CPT | Mod: 26,RT

## 2020-07-03 PROCEDURE — 99239 HOSP IP/OBS DSCHRG MGMT >30: CPT

## 2020-07-03 RX ORDER — ROSUVASTATIN CALCIUM 5 MG/1
1 TABLET ORAL
Qty: 0 | Refills: 0 | DISCHARGE

## 2020-07-03 RX ORDER — ASPIRIN/CALCIUM CARB/MAGNESIUM 324 MG
1 TABLET ORAL
Qty: 30 | Refills: 11
Start: 2020-07-03 | End: 2021-06-27

## 2020-07-03 RX ORDER — ROSUVASTATIN CALCIUM 5 MG/1
1 TABLET ORAL
Qty: 30 | Refills: 3
Start: 2020-07-03 | End: 2020-10-30

## 2020-07-03 RX ORDER — ASPIRIN/CALCIUM CARB/MAGNESIUM 324 MG
1 TABLET ORAL
Qty: 0 | Refills: 0 | DISCHARGE

## 2020-07-03 RX ORDER — AMLODIPINE BESYLATE 2.5 MG/1
0 TABLET ORAL
Qty: 0 | Refills: 0 | DISCHARGE

## 2020-07-03 RX ORDER — CLOPIDOGREL BISULFATE 75 MG/1
1 TABLET, FILM COATED ORAL
Qty: 30 | Refills: 11
Start: 2020-07-03 | End: 2021-06-27

## 2020-07-03 RX ORDER — AMLODIPINE BESYLATE 2.5 MG/1
1 TABLET ORAL
Qty: 30 | Refills: 3
Start: 2020-07-03 | End: 2020-10-30

## 2020-07-03 RX ADMIN — CLOPIDOGREL BISULFATE 75 MILLIGRAM(S): 75 TABLET, FILM COATED ORAL at 16:00

## 2020-07-03 RX ADMIN — Medication 81 MILLIGRAM(S): at 16:01

## 2020-07-03 NOTE — CHART NOTE - NSCHARTNOTEFT_GEN_A_CORE
Pt seen and examined at bedside. He was in bed rest throughout the day after developing hematoma on right groin this AM. Walked 20 minutes around the unit this evening and groin evaluated after the walk--stable, no hematoma; leg is warm, pulses intact.   -Arterial duplex is negative for pseudoaneurysm. Small hematoma [3cm by 1 cm] is present next to the CFA without any communication with an artery.   -Case discussed with the interventional attending. Pt is stable for discharge.   -Will f/u with him on Monday with a Telehealth visit.   -Pt aware to return to ED if he experiences significant pain, swelling in the groin or changes in sensation/color of the leg.

## 2020-07-03 NOTE — PROVIDER CONTACT NOTE (CRITICAL VALUE NOTIFICATION) - SITUATION
Covering RN- patient noted with HR 37, made ARLENE Garcia aware, no further interventions at this time as per ARLENE Garcia.
Telemetry technician alerted of 2.3 second pause

## 2020-07-03 NOTE — CHART NOTE - NSCHARTNOTEFT_GEN_A_CORE
11:30pm- RN called PA to bedside due to blood saturating right groin dressing. Removed gauze and noted venous ooze. VSS, no hematoma or tenderness, distal pulses intact. Manual compression held for 15 min without resolution. Subsequently injected 2cc xylocaine with hemostasis and applied quickclot dressing. Continue bed rest for remainder of the night.

## 2020-07-03 NOTE — CHART NOTE - NSCHARTNOTEFT_GEN_A_CORE
Overnight patient with ooze which was epi/lido'd and compressed with quick clot with resolution. This AM patient pending discharge home, patient with bedrest throughout night secondary to ooze of access site. Prior to discharge patient ambulated with nurse, and developed a hematoma. Hematoma compressed for 15 minutes with resolution, VS remained stable, no bruits, and pulses remained intact. PA returned to evaluate site, and patient with softball size hematoma, compressed for 20 minutes and femstop applied to site. US ordered to r/o dissection and aneurysm, CBC ordered stat. Interventional fellow Jarocho made aware. Will continue to monitor Overnight patient with ooze which was epi/lido'd and compressed with quick clot with resolution. This AM patient pending discharge home, patient with bedrest throughout night secondary to ooze of access site. Prior to discharge patient ambulated with nurse, and developed a hematoma. Hematoma compressed for 15 minutes with resolution, VS remained stable, no bruits, and pulses remained intact. PA returned to evaluate site, and patient with softball size hematoma, compressed for 20 minutes and femstop applied to site VS continued to remain stable, with no bruits, and pulses intact. US ordered to r/o dissection and aneurysm, CBC ordered stat. Interventional fellow Jarocho made aware. Will continue to monitor

## 2020-07-03 NOTE — DISCHARGE NOTE NURSING/CASE MANAGEMENT/SOCIAL WORK - PATIENT PORTAL LINK FT
You can access the FollowMyHealth Patient Portal offered by [facility] by registering at the following website: [email or web address]. By joining [facility]’s FollowMyHealth portal, you will also be able to view your health information using other applications (apps) compatible with our system.

## 2020-07-03 NOTE — PROVIDER CONTACT NOTE (OTHER) - SITUATION
Pt resting in bed with no signs of distress or complains. Upon assess surgical site, moderate sero-sanguinous oozing was note and marked.

## 2020-07-03 NOTE — PROVIDER CONTACT NOTE (CRITICAL VALUE NOTIFICATION) - ASSESSMENT
Pt resting in bed, denied cp, palpitations, or any signs of distress bp 152/81, hrt 58, 98% ra, 14 rr
AAOx4, /69, denies discomfort

## 2020-07-03 NOTE — PROVIDER CONTACT NOTE (OTHER) - ACTION/TREATMENT ORDERED:
Pt evaluated at bedside. ARLENE Fields intervened accordingly and changed dressing utilizing medicated gauze and thin film. Continue to monitor.

## 2020-07-06 ENCOUNTER — APPOINTMENT (OUTPATIENT)
Dept: HEART AND VASCULAR | Facility: CLINIC | Age: 75
End: 2020-07-06
Payer: MEDICARE

## 2020-07-06 DIAGNOSIS — Z86.79 PERSONAL HISTORY OF OTHER DISEASES OF THE CIRCULATORY SYSTEM: ICD-10-CM

## 2020-07-06 PROBLEM — E24.0 PITUITARY-DEPENDENT CUSHING'S DISEASE: Chronic | Status: ACTIVE | Noted: 2020-06-30

## 2020-07-06 PROBLEM — D68.61 ANTIPHOSPHOLIPID SYNDROME: Chronic | Status: ACTIVE | Noted: 2020-06-30

## 2020-07-06 PROCEDURE — 99214 OFFICE O/P EST MOD 30 MIN: CPT | Mod: 95

## 2020-07-06 PROCEDURE — 99203 OFFICE O/P NEW LOW 30 MIN: CPT | Mod: 95

## 2020-07-07 PROCEDURE — 84132 ASSAY OF SERUM POTASSIUM: CPT

## 2020-07-07 PROCEDURE — C1725: CPT

## 2020-07-07 PROCEDURE — 85027 COMPLETE CBC AUTOMATED: CPT

## 2020-07-07 PROCEDURE — 85025 COMPLETE CBC W/AUTO DIFF WBC: CPT

## 2020-07-07 PROCEDURE — 83735 ASSAY OF MAGNESIUM: CPT

## 2020-07-07 PROCEDURE — 36415 COLL VENOUS BLD VENIPUNCTURE: CPT

## 2020-07-07 PROCEDURE — C1874: CPT

## 2020-07-07 PROCEDURE — 82553 CREATINE MB FRACTION: CPT

## 2020-07-07 PROCEDURE — 85730 THROMBOPLASTIN TIME PARTIAL: CPT

## 2020-07-07 PROCEDURE — C1769: CPT

## 2020-07-07 PROCEDURE — 93926 LOWER EXTREMITY STUDY: CPT

## 2020-07-07 PROCEDURE — 82550 ASSAY OF CK (CPK): CPT

## 2020-07-07 PROCEDURE — 80061 LIPID PANEL: CPT

## 2020-07-07 PROCEDURE — 80053 COMPREHEN METABOLIC PANEL: CPT

## 2020-07-07 PROCEDURE — 85610 PROTHROMBIN TIME: CPT

## 2020-07-07 PROCEDURE — C1760: CPT

## 2020-07-07 PROCEDURE — C1894: CPT

## 2020-07-07 PROCEDURE — 83036 HEMOGLOBIN GLYCOSYLATED A1C: CPT

## 2020-07-07 PROCEDURE — C1889: CPT

## 2020-07-07 PROCEDURE — C1887: CPT

## 2020-07-07 NOTE — REASON FOR VISIT
[Follow-Up - From Hospitalization] : follow-up of a recent hospitalization for [FreeTextEntry2] : s/p PCI 7/2/2020

## 2020-07-07 NOTE — DISCUSSION/SUMMARY
[FreeTextEntry1] : 75M, hx of CAD s/p CABG in 2015, and now s/p PCI of LCx and LAD on 7/2/20, here for a telehealth visit as followup to the hospital discharge. \par \par CAD s/p PCI: Improvement of symptoms since PCI and increased exercise capacity. Cont with DAPT (ASA 81mg + Plavix 75mg daily), Crestor 40mg daily, and losartan-HCTZ 100/12.5. Small R groin hematoma now resolved. \par \par HTN: Unable to check BP today as visit was done over the phone. However upon discharge from the hospital, BP was within acceptable range. Cont with Losartan-HCTZ and Amlodipine 10mg. \par \par Dyslipidemia: Cont with Crestor

## 2020-07-07 NOTE — HISTORY OF PRESENT ILLNESS
[FreeTextEntry1] : 76yo male with a hx of CAD s/p CABG x 2 (04/12/15 at time of AVR), aortic stenosis s/p bioprosthetic AVR (04/12/15), aortic atherosclerosis, HTN, Cushing's syndrome, and antiphospholipid syndrome with recent admission for PCI of LCx and LAD connected via a Telehealth visit for post-hospital discharge follow-up. He denies any chest pain, sob, palpiations, diaphoresis or weakness. Reports walking up to 1.4-1.5 miles in each of the last two days without difficulty. Right groin access site reportedly is soft and nontender. Leg is warm.\par \par Cardiac Cath (7/2/2020): R Groin Access (Angioseal). \par 3V CAD:  Prox LAD 60% FFR 0.73 s/p SANGEETA, MidLAD 70%, FFR 0.6 s/p catheterzation ; Distal LAD 50% stenosis.\par OM1 80% stenosis s/p SANGEETA.

## 2020-07-10 DIAGNOSIS — R00.1 BRADYCARDIA, UNSPECIFIED: ICD-10-CM

## 2020-07-10 DIAGNOSIS — Z95.2 PRESENCE OF PROSTHETIC HEART VALVE: ICD-10-CM

## 2020-07-10 DIAGNOSIS — I25.10 ATHEROSCLEROTIC HEART DISEASE OF NATIVE CORONARY ARTERY WITHOUT ANGINA PECTORIS: ICD-10-CM
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
## 2020-07-10 DIAGNOSIS — Z95.1 PRESENCE OF AORTOCORONARY BYPASS GRAFT: ICD-10-CM
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
## 2020-07-10 DIAGNOSIS — Z85.820 PERSONAL HISTORY OF MALIGNANT MELANOMA OF SKIN: ICD-10-CM

## 2020-07-10 DIAGNOSIS — Z88.5 ALLERGY STATUS TO NARCOTIC AGENT: ICD-10-CM

## 2020-07-10 DIAGNOSIS — I25.810 ATHEROSCLEROSIS OF CORONARY ARTERY BYPASS GRAFT(S) WITHOUT ANGINA PECTORIS: ICD-10-CM

## 2020-07-10 DIAGNOSIS — I70.0 ATHEROSCLEROSIS OF AORTA: ICD-10-CM

## 2020-07-10 DIAGNOSIS — Z91.018 ALLERGY TO OTHER FOODS: ICD-10-CM

## 2020-07-10 DIAGNOSIS — I10 ESSENTIAL (PRIMARY) HYPERTENSION: ICD-10-CM
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
## 2020-07-10 DIAGNOSIS — Z79.899 OTHER LONG TERM (CURRENT) DRUG THERAPY: ICD-10-CM

## 2020-07-10 DIAGNOSIS — Z87.891 PERSONAL HISTORY OF NICOTINE DEPENDENCE: ICD-10-CM

## 2020-07-10 DIAGNOSIS — E24.9 CUSHING'S SYNDROME, UNSPECIFIED: ICD-10-CM

## 2020-07-10 DIAGNOSIS — I97.630 POSTPROCEDURAL HEMATOMA OF A CIRCULATORY SYSTEM ORGAN OR STRUCTURE FOLLOWING A CARDIAC CATHETERIZATION: ICD-10-CM

## 2020-07-10 DIAGNOSIS — Z79.82 LONG TERM (CURRENT) USE OF ASPIRIN: ICD-10-CM

## 2020-07-10 DIAGNOSIS — D68.61 ANTIPHOSPHOLIPID SYNDROME: ICD-10-CM

## 2020-07-10 DIAGNOSIS — E78.5 HYPERLIPIDEMIA, UNSPECIFIED: ICD-10-CM

## 2020-07-10 PROBLEM — Z20.828 EXPOSURE TO COVID-19 VIRUS: Status: RESOLVED | Noted: 2020-06-29 | Resolved: 2020-07-10

## 2020-07-13 ENCOUNTER — NON-APPOINTMENT (OUTPATIENT)
Age: 75
End: 2020-07-13

## 2020-07-13 ENCOUNTER — APPOINTMENT (OUTPATIENT)
Dept: HEART AND VASCULAR | Facility: CLINIC | Age: 75
End: 2020-07-13
Payer: MEDICARE

## 2020-07-13 VITALS
BODY MASS INDEX: 23.62 KG/M2 | DIASTOLIC BLOOD PRESSURE: 71 MMHG | HEIGHT: 70 IN | SYSTOLIC BLOOD PRESSURE: 138 MMHG | OXYGEN SATURATION: 98 % | HEART RATE: 61 BPM | WEIGHT: 165 LBS

## 2020-07-13 VITALS — SYSTOLIC BLOOD PRESSURE: 130 MMHG | DIASTOLIC BLOOD PRESSURE: 73 MMHG

## 2020-07-13 DIAGNOSIS — Z20.828 CONTACT WITH AND (SUSPECTED) EXPOSURE TO OTHER VIRAL COMMUNICABLE DISEASES: ICD-10-CM

## 2020-07-13 PROCEDURE — 99215 OFFICE O/P EST HI 40 MIN: CPT | Mod: 25

## 2020-07-13 PROCEDURE — 93000 ELECTROCARDIOGRAM COMPLETE: CPT

## 2020-07-13 RX ORDER — FLUOROURACIL 50 MG/G
5 CREAM TOPICAL
Qty: 40 | Refills: 0 | Status: ACTIVE | COMMUNITY
Start: 2020-06-17

## 2020-07-13 NOTE — ASSESSMENT
[FreeTextEntry1] : 1. CAD: s/p CABG x 2 at time of AVR 04/25/15, SVG-LCx, SVG-RCA, Russ Lester MD at [facility]: 07/02/20: PCI prox/mid LAD and OM1.  \par      - will pursue aggressive medical management\par      - continue DAPT with ASA 81mg p QD and clopidogrel 75mg po QD x 1 year (07/2021)\par      - will check lab work today including troponin\par \par 2. Aortic stenosis: s/p bioprosthetic AVR, #25 Medtronic porcine, Russ Lester MD at [facility] 04/25/15, s/p echo 05/22/20: EF 67%, normally functioning bioprosthetic AVR, mild LVH, dilated LA. \par      - discussed with patient SBE prophylaxis per AHA guidelines\par      - will follow with serial echocardiograms\par \par 3. HTN: ? secondary to Cushing's syndrome: BP at ACC/AHA 2017 guideline target\par      - continue amlodipine 10mg po QD\par      - continue losartan//25mg po QD\par \par 8. r/o PAD: + cold feet\par      - will send for bilateral lower extremity arterial sonography\par \par 4. Dyslipidemia: lipids at goal\par      - continue rosuvastatin 40mg po QD\par \par 5. 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 Antiphospholipid syndrome: no history of arterial or venous thrombosis:\par      - follow up with hematologist\par \par 7. Sick sinus syndrome: likely chronotropic incompetence\par     - will follow up with heart rhythm specialist and discuss timing of pacemaker insertion\par \par \par

## 2020-07-13 NOTE — HISTORY OF PRESENT ILLNESS
[FreeTextEntry1] : Mr. Castorena presents for follow up and management of CAD s/p CABG x 2 (04/12/15 at time of AVR), aortic stenosis s/p bioprosthetic AVR (04/12/15), aortic atherosclerosis, HTN, Cushing's syndrome, and antiphospholipid syndrome.   He was previously followed by Guillermo Muir MD.   On 04/12/15 he underwent a bioprosthetic AVR (# 25 Medtronic porcine) and CABG x 2 with Russ Lester MD at [facility].  In August, 2019 he had complaints of abdominal pain and underwent a CTA chest/abdomen/pelvis on 08/19/19 which revealed no aortic dissection, + severe aortic and coronary plaque, aortic valve calcifications, a 3.6cm left adrenal mass, and 2cm right renal mass.  He had removal of the right renal mass which was benign.   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  He is following with a neurosurgeon, Carol Flores MD, and a head and neck surgeon, Javier Arguelles MD, at [facility] and had pituitary surgery on 05/16/20. He was admitted for 5 days in the post operative period mostly for cardiac observation. On 05/26/20 I received a call from a cardiologist at [facility], Angelica Katz MD, informing me of a 16 beat run of NSVT during his post operative course.  The run was asymptomatic.  He had an echocardiogram on 05/22/20 which revealed an EF of 67%, normally functioning bioprosthetic AVR, mild LVH, and a dilated LA.  He was initiated on Toprol XL 50mg po QD.  On 06/11/20 had a 3.6 second pause during bowel movement noted on MCT monitor.  We agreed to see a heart rhythm specialist. He was evaluated by Franchesca Murcia MD and was tapered off beta blockers.  On 06/16/20 he underwent a CTCA which revealed SVG-distal RCA patent, SVG-OM occluded, LM normal, LAD prox severe, mid moderate, D1 moderate, D2 mild, LCx mild, OM1 severe, LPL normal, RCA prox severe, mid occluded, and distal with patent graft.  He underwent invasive coronary angiography on 07/02/20 which revealed LM mild, LAD prox and mid 80% (FFR +) underwent PCI, LCx mild, OM1 90% underwent PCI, RCA prox 100%, patent SVG-RCA, occluded SVG-OM1.  The following day he had a groin bleed and a sinus pause overnight but otherwise did well.  Post discharge he experienced an episode of epistaxis.  Over the weekend, he described an episode of chest pressure lasting several hours which resolved spontaneously.  In addition, his TEIXEIRA has persisted post PCI and we discussed addressing his chronotropic incompetence.  \par

## 2020-07-13 NOTE — PHYSICAL EXAM
[S3] : no S3 [FreeTextEntry1] : Right thigh healing bruise [Right Carotid Bruit] : no bruit heard over the right carotid [S4] : no S4 [Right Femoral Bruit] : no bruit heard over the right femoral artery [Left Carotid Bruit] : no bruit heard over the left carotid [Left Femoral Bruit] : no bruit heard over the left femoral artery

## 2020-07-13 NOTE — REASON FOR VISIT
[FreeTextEntry1] : Diagnostic Tests:\par ----------------------------------\par ECG: \par 07/13/20: sinus rhythm, incomplete RBBB. \par 06/16/20: sinus bradycardia at 42 bpm, incomplete RBBB. \par 05/28/20: sinus bradycardia at 53 bpm, incomplete RBBB. \par 03/03/20: marked sinus bradycardia at 49 bpm, incomplete RBBB. \par ----------------------------------\par MR:\par 08/12/19: abdomen: left adrenal nodule 2.7cm, 2cm right renal cell carcinoma, hepatic steatosis. \par ----------------------------------\par CT:\par 06/16/20: CTCA: SVG-distal RCA patent, SVG-OM occluded, LM normal, LAD prox severe, mid moderate, D1 moderate, D2 mild, LCx mild, OM1 severe, LPL normal, RCA prox severe, mid occluded, distal with patent graft.\par 03/10/20: CT abdomen/pelvis: s/p excision right renal mass, cyst lateral to aortic bifurcation, aorta-iliac atherosclerotic calcification. \par 08/09/19: CTA chest/abdomen/pelvis: no aortic dissection, + severe aortic and coronary plaque, aortic valve calcifications, 3.6cm left adrenal mass, 2cm right renal mass. \par ---------------------------------\par Echo:\par 05/22/20: EF 67%, normally functioning bioprosthetic AVR, mild LVH, dilated LA. 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 \par 01/1019: EF 55%, LVH, dilated LA, bioprosthetic AVR normally functioning, dilated ascending aorta. \par ---------------------------------\par Stress:\par 03/09/20: exercise stress echo: EF 63%, mild MR, s/p bioprosthetic AVR, normal wall motion, PA pressures, and ECG post 10.1 METs. \par 07/21/15: ETT: 7 minutes of Wilson, no ischemia. \par ---------------------------------\par Cath:\par 07/02/20: LM mild, LAD prox and mid 80% (FFR +) underwent PCI, LCx mild, OM1 90% underwent PCI, RCA prox 100%, patent SVG-RCA, occluded SVG-OM1.  \par 03/03/15: LM normal, LAD prox 60%, distal 70%, LCX 80%, OM1 80%, RCA prox 60%, AV peak-to-peak 49mmHg, MARYLOU 0.9cm2.\par ---------------------------------\par Monitor:\par 06/04/20 to 06/30/20: Biotel MCT: HR 25/50/120, <1% PVCs, <1% APCS, 18 pauses > 2 seconds, longest > 4 seconds, no AF.

## 2020-07-14 LAB
ALBUMIN SERPL ELPH-MCNC: 4.8 G/DL
ALP BLD-CCNC: 69 U/L
ALT SERPL-CCNC: 43 U/L
ANION GAP SERPL CALC-SCNC: 15 MMOL/L
AST SERPL-CCNC: 32 U/L
BASOPHILS # BLD AUTO: 0.05 K/UL
BASOPHILS NFR BLD AUTO: 0.5 %
BILIRUB SERPL-MCNC: 1.2 MG/DL
BUN SERPL-MCNC: 15 MG/DL
CALCIUM SERPL-MCNC: 10.2 MG/DL
CHLORIDE SERPL-SCNC: 101 MMOL/L
CHOLEST SERPL-MCNC: 80 MG/DL
CHOLEST/HDLC SERPL: 2.6 RATIO
CO2 SERPL-SCNC: 29 MMOL/L
CREAT SERPL-MCNC: 0.87 MG/DL
EOSINOPHIL # BLD AUTO: 0.13 K/UL
EOSINOPHIL NFR BLD AUTO: 1.3 %
ESTIMATED AVERAGE GLUCOSE: 103 MG/DL
GLUCOSE SERPL-MCNC: 88 MG/DL
HBA1C MFR BLD HPLC: 5.2 %
HCT VFR BLD CALC: 44.3 %
HDLC SERPL-MCNC: 31 MG/DL
HGB BLD-MCNC: 14.1 G/DL
IMM GRANULOCYTES NFR BLD AUTO: 0.3 %
LDLC SERPL CALC-MCNC: 16 MG/DL
LDLC SERPL DIRECT ASSAY-MCNC: 26 MG/DL
LYMPHOCYTES # BLD AUTO: 1.5 K/UL
LYMPHOCYTES NFR BLD AUTO: 15 %
MAN DIFF?: NORMAL
MCHC RBC-ENTMCNC: 30.9 PG
MCHC RBC-ENTMCNC: 31.8 GM/DL
MCV RBC AUTO: 97.1 FL
MONOCYTES # BLD AUTO: 1.05 K/UL
MONOCYTES NFR BLD AUTO: 10.5 %
NEUTROPHILS # BLD AUTO: 7.22 K/UL
NEUTROPHILS NFR BLD AUTO: 72.4 %
PLATELET # BLD AUTO: 312 K/UL
POTASSIUM SERPL-SCNC: 3.7 MMOL/L
PROT SERPL-MCNC: 7.2 G/DL
RBC # BLD: 4.56 M/UL
RBC # FLD: 14.1 %
SODIUM SERPL-SCNC: 145 MMOL/L
TRIGL SERPL-MCNC: 165 MG/DL
TROPONIN I SERPL-MCNC: 0.02 NG/ML
TSH SERPL-ACNC: 2.47 UIU/ML
WBC # FLD AUTO: 9.98 K/UL

## 2020-08-03 ENCOUNTER — NON-APPOINTMENT (OUTPATIENT)
Age: 75
End: 2020-08-03

## 2020-08-03 ENCOUNTER — APPOINTMENT (OUTPATIENT)
Dept: HEART AND VASCULAR | Facility: CLINIC | Age: 75
End: 2020-08-03
Payer: MEDICARE

## 2020-08-03 VITALS
DIASTOLIC BLOOD PRESSURE: 69 MMHG | HEART RATE: 52 BPM | TEMPERATURE: 98.1 F | BODY MASS INDEX: 24.05 KG/M2 | SYSTOLIC BLOOD PRESSURE: 119 MMHG | HEIGHT: 70 IN | WEIGHT: 168 LBS | OXYGEN SATURATION: 98 %

## 2020-08-03 PROCEDURE — 99215 OFFICE O/P EST HI 40 MIN: CPT | Mod: 25

## 2020-08-03 PROCEDURE — 93000 ELECTROCARDIOGRAM COMPLETE: CPT

## 2020-08-03 NOTE — REASON FOR VISIT
[Follow-Up - Clinic] : a clinic follow-up of [FreeTextEntry1] : Diagnostic Tests:\par ----------------------------------\par ECG: \par 08/03/20: sinus rhythm, incomplete RBBB. \par 07/13/20: sinus rhythm, incomplete RBBB. \par 06/16/20: sinus bradycardia at 42 bpm, incomplete RBBB. \par 05/28/20: sinus bradycardia at 53 bpm, incomplete RBBB. \par 03/03/20: marked sinus bradycardia at 49 bpm, incomplete RBBB. \par ----------------------------------\par MR:\par 08/12/19: abdomen: left adrenal nodule 2.7cm, 2cm right renal cell carcinoma, hepatic steatosis. \par ----------------------------------\par CT:\par 06/16/20: CTCA: SVG-distal RCA patent, SVG-OM occluded, LM normal, LAD prox severe, mid moderate, D1 moderate, D2 mild, LCx mild, OM1 severe, LPL normal, RCA prox severe, mid occluded, distal with patent graft.\par 03/10/20: CT abdomen/pelvis: s/p excision right renal mass, cyst lateral to aortic bifurcation, aorta-iliac atherosclerotic calcification. \par 08/09/19: CTA chest/abdomen/pelvis: no aortic dissection, + severe aortic and coronary plaque, aortic valve calcifications, 3.6cm left adrenal mass, 2cm right renal mass. \par ---------------------------------\par Echo:\par 05/22/20: EF 67%, normally functioning bioprosthetic AVR, mild LVH, dilated LA. 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 \par 01/1019: EF 55%, LVH, dilated LA, bioprosthetic AVR normally functioning, dilated ascending aorta. \par ---------------------------------\par Stress:\par 03/09/20: exercise stress echo: EF 63%, mild MR, s/p bioprosthetic AVR, normal wall motion, PA pressures, and ECG post 10.1 METs. \par 07/21/15: ETT: 7 minutes of Wilson, no ischemia. \par ---------------------------------\par Cath:\par 07/02/20: LM mild, LAD prox and mid 80% (FFR +) underwent PCI, LCx mild, OM1 90% underwent PCI, RCA prox 100%, patent SVG-RCA, occluded SVG-OM1.  \par 03/03/15: LM normal, LAD prox 60%, distal 70%, LCX 80%, OM1 80%, RCA prox 60%, AV peak-to-peak 49mmHg, MARYLOU 0.9cm2.\par ---------------------------------\par Monitor:\par 06/04/20 to 06/30/20: Viroproel MCT: HR 25/50/120, <1% PVCs, <1% APCS, 18 pauses > 2 seconds, longest > 4 seconds, no AF.

## 2020-08-03 NOTE — REVIEW OF SYSTEMS
[Shortness Of Breath] : shortness of breath [Dizziness] : dizziness [Chest  Pressure] : chest pressure [Chest Pain] : chest pain [Negative] : Heme/Lymph [FreeTextEntry1] : + cold feet

## 2020-08-03 NOTE — HISTORY OF PRESENT ILLNESS
[FreeTextEntry1] : Mr. Castorena presents for follow up and management of CAD s/p CABG x 2 (04/12/15 at time of AVR), aortic stenosis s/p bioprosthetic AVR (04/12/15), aortic atherosclerosis, HTN, Cushing's syndrome, and antiphospholipid syndrome.   He was previously followed by Guillermo Muir MD.   On 04/12/15 he underwent a bioprosthetic AVR (# 25 Medtronic porcine) and CABG x 2 with Russ Lester MD at [facility].  In August, 2019 he had complaints of abdominal pain and underwent a CTA chest/abdomen/pelvis on 08/19/19 which revealed no aortic dissection, + severe aortic and coronary plaque, aortic valve calcifications, a 3.6cm left adrenal mass, and 2cm right renal mass.  He had removal of the right renal mass which was benign.   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  He is following with a neurosurgeon, Carol Flores MD, and a head and neck surgeon, Javier Arguelles MD, at [facility] and had pituitary surgery on 05/16/20. He was admitted for 5 days in the post operative period mostly for cardiac observation. On 05/26/20 I received a call from a cardiologist at [facility], Angelica Katz MD, informing me of a 16 beat run of NSVT during his post operative course.  The run was asymptomatic.  He had an echocardiogram on 05/22/20 which revealed an EF of 67%, normally functioning bioprosthetic AVR, mild LVH, and a dilated LA.  He was initiated on Toprol XL 50mg po QD.  On 06/11/20 had a 3.6 second pause during bowel movement noted on MCT monitor.  We agreed to see a heart rhythm specialist. He was evaluated by Franchesca Murcia MD and was tapered off beta blockers.  On 06/16/20 he underwent a CTCA which revealed SVG-distal RCA patent, SVG-OM occluded, LM normal, LAD prox severe, mid moderate, D1 moderate, D2 mild, LCx mild, OM1 severe, LPL normal, RCA prox severe, mid occluded, and distal with patent graft.  He underwent invasive coronary angiography on 07/02/20 which revealed LM mild, LAD prox and mid 80% (FFR +) underwent PCI, LCx mild, OM1 90% underwent PCI, RCA prox 100%, patent SVG-RCA, occluded SVG-OM1.  The following day he had a groin bleed and a sinus pause overnight but otherwise did well.   Over the weekend, he described an episode of chest pressure lasting several minutes at a time and occurring several times since the PCI.  He is scheduled for PPM insertion on 08/20/20.  \par

## 2020-08-03 NOTE — PHYSICAL EXAM
[General Appearance - Well Developed] : well developed [Normal Appearance] : normal appearance [Well Groomed] : well groomed [General Appearance - Well Nourished] : well nourished [Normal Conjunctiva] : the conjunctiva exhibited no abnormalities [General Appearance - In No Acute Distress] : no acute distress [No Deformities] : no deformities [Normal Oral Mucosa] : normal oral mucosa [No Oral Pallor] : no oral pallor [Eyelids - No Xanthelasma] : the eyelids demonstrated no xanthelasmas [No Oral Cyanosis] : no oral cyanosis [Normal Jugular Venous A Waves Present] : normal jugular venous A waves present [Normal Jugular Venous V Waves Present] : normal jugular venous V waves present [No Jugular Venous Barreto A Waves] : no jugular venous barreto A waves [Respiration, Rhythm And Depth] : normal respiratory rhythm and effort [Auscultation Breath Sounds / Voice Sounds] : lungs were clear to auscultation bilaterally [Abdomen Soft] : soft [Exaggerated Use Of Accessory Muscles For Inspiration] : no accessory muscle use [Abnormal Walk] : normal gait [Abdomen Tenderness] : non-tender [Abdomen Mass (___ Cm)] : no abdominal mass palpated [Gait - Sufficient For Exercise Testing] : the gait was sufficient for exercise testing [Nail Clubbing] : no clubbing of the fingernails [Petechial Hemorrhages (___cm)] : no petechial hemorrhages [Cyanosis, Localized] : no localized cyanosis [] : no rash [Skin Color & Pigmentation] : normal skin color and pigmentation [No Venous Stasis] : no venous stasis [No Skin Ulcers] : no skin ulcer [Skin Lesions] : no skin lesions [No Xanthoma] : no  xanthoma was observed [Affect] : the affect was normal [Oriented To Time, Place, And Person] : oriented to person, place, and time [Bradycardia] : bradycardic [Mood] : the mood was normal [No Anxiety] : not feeling anxious [Normal S2] : normal S2 [Normal S1] : normal S1 [Crescendo-Decrescendo] : crescendo-decrescendo [II] : a grade 2 [No Abnormalities] : the abdominal aorta was not enlarged and no bruit was heard [2+] : left 2+ [No Pitting Edema] : no pitting edema present [FreeTextEntry1] : Right thigh healing bruise [S3] : no S3 [S4] : no S4 [Right Carotid Bruit] : no bruit heard over the right carotid [Left Carotid Bruit] : no bruit heard over the left carotid [Right Femoral Bruit] : no bruit heard over the right femoral artery [Left Femoral Bruit] : no bruit heard over the left femoral artery

## 2020-08-03 NOTE — ASSESSMENT
[FreeTextEntry1] : 1. CAD: s/p CABG x 2 at time of AVR 04/25/15, SVG-LCx, SVG-RCA, Russ Lester MD at [facility]: 07/02/20: PCI prox/mid LAD and OM1.  \par      - will pursue aggressive medical management\par      - continue DAPT with ASA 81mg p QD and clopidogrel 75mg po QD x 1 year (07/2021)\par \par 2. Aortic stenosis: s/p bioprosthetic AVR, #25 Medtronic porcine, Russ Lester MD at [facility] 04/25/15, s/p echo 05/22/20: EF 67%, normally functioning bioprosthetic AVR, mild LVH, dilated LA. \par      - discussed with patient SBE prophylaxis per AHA guidelines\par      - will follow with serial echocardiograms\par \par 3. HTN: ? secondary to Cushing's syndrome: BP at ACC/AHA 2017 guideline target\par      - continue amlodipine 10mg po QD\par      - continue losartan//25mg po QD\par \par 8. r/o PAD: + cold feet\par      - will send for bilateral lower extremity arterial sonography\par \par 4. Dyslipidemia: lipids at goal\par      - continue rosuvastatin 40mg po QD\par \par 5. 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 \par \par 6. Antiphospholipid syndrome: no history of arterial or venous thrombosis:\par      - follow up with hematologist\par \par 7. Sick sinus syndrome: likely chronotropic incompetence\par     - will have PPM implanted with Franchesca Murcia MD on 08/20/20\par \par \par

## 2020-08-17 ENCOUNTER — TRANSCRIPTION ENCOUNTER (OUTPATIENT)
Age: 75
End: 2020-08-17

## 2020-08-18 ENCOUNTER — APPOINTMENT (OUTPATIENT)
Dept: HEART AND VASCULAR | Facility: CLINIC | Age: 75
End: 2020-08-18
Payer: MEDICARE

## 2020-08-18 VITALS
SYSTOLIC BLOOD PRESSURE: 147 MMHG | BODY MASS INDEX: 24.34 KG/M2 | OXYGEN SATURATION: 98 % | HEIGHT: 70 IN | WEIGHT: 170.03 LBS | DIASTOLIC BLOOD PRESSURE: 71 MMHG | HEART RATE: 52 BPM

## 2020-08-18 VITALS — DIASTOLIC BLOOD PRESSURE: 84 MMHG | SYSTOLIC BLOOD PRESSURE: 130 MMHG

## 2020-08-18 PROCEDURE — 99215 OFFICE O/P EST HI 40 MIN: CPT | Mod: 25

## 2020-08-18 PROCEDURE — 93925 LOWER EXTREMITY STUDY: CPT | Mod: 59

## 2020-08-18 NOTE — HISTORY OF PRESENT ILLNESS
[FreeTextEntry1] : Mr. Castorena presents for follow up and management of CAD s/p CABG x 2 (04/12/15 at time of AVR), aortic stenosis s/p bioprosthetic AVR (04/12/15), aortic atherosclerosis, HTN, Cushing's syndrome, PAD, and antiphospholipid syndrome.   He was previously followed by Guillermo Muir MD.   On 04/12/15 he underwent a bioprosthetic AVR (# 25 Medtronic porcine) and CABG x 2 with Russ Lester MD at [facility].  In August, 2019 he had complaints of abdominal pain and underwent a CTA chest/abdomen/pelvis on 08/19/19 which revealed no aortic dissection, + severe aortic and coronary plaque, aortic valve calcifications, a 3.6cm left adrenal mass, and 2cm right renal mass.  He had removal of the right renal mass which was benign.   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  He is following with a neurosurgeon, Carol Flores MD, and a head and neck surgeon, Javier Arguelles MD, at [facility] and had pituitary surgery on 05/16/20. He was admitted for 5 days in the post operative period mostly for cardiac observation. On 05/26/20 I received a call from a cardiologist at [facility], Angelica Katz MD, informing me of a 16 beat run of NSVT during his post operative course.  The run was asymptomatic.  He had an echocardiogram on 05/22/20 which revealed an EF of 67%, normally functioning bioprosthetic AVR, mild LVH, and a dilated LA.  He was initiated on Toprol XL 50mg po QD.  On 06/11/20 had a 3.6 second pause during bowel movement noted on MCT monitor.  We agreed to see a heart rhythm specialist. He was evaluated by Franchesca Murcia MD and was tapered off beta blockers.  On 06/16/20 he underwent a CTCA which revealed SVG-distal RCA patent, SVG-OM occluded, LM normal, LAD prox severe, mid moderate, D1 moderate, D2 mild, LCx mild, OM1 severe, LPL normal, RCA prox severe, mid occluded, and distal with patent graft.  He underwent invasive coronary angiography on 07/02/20 which revealed LM mild, LAD prox and mid 80% (FFR +) underwent PCI, LCx mild, OM1 90% underwent PCI, RCA prox 100%, patent SVG-RCA, occluded SVG-OM1.  The following day he had a groin bleed and a sinus pause overnight but otherwise did well.  Over that weekend, he described an episode of chest pressure lasting several minutes at a time and occurring several times since the PCI.  SInce his last visit, he has been well and denies recurrent chest pain. Today (08/18/20) he underwent bilateral lower extremity arterial sonography which revealed left XAVIER 100% mid, left CFA 20-49% stenosis, and right CFA <20% stenosis.  He is scheduled for PPM insertion on 08/20/20.  \par

## 2020-08-18 NOTE — REVIEW OF SYSTEMS
[Shortness Of Breath] : shortness of breath [Dizziness] : dizziness [Negative] : Heme/Lymph [FreeTextEntry1] : + cold feet

## 2020-08-18 NOTE — ASSESSMENT
[FreeTextEntry1] : 1. CAD: s/p CABG x 2 at time of AVR 04/25/15, SVG-LCx, SVG-RCA, Russ Lester MD at [facility]: 07/02/20: PCI prox/mid LAD and OM1.  \par      - will pursue aggressive medical management\par      - continue DAPT with ASA 81mg p QD and clopidogrel 75mg po QD x 1 year (07/2021)\par \par 2. Aortic stenosis: s/p bioprosthetic AVR, #25 Medtronic porcine, Russ Lester MD at [facility] 04/25/15, s/p echo 05/22/20: EF 67%, normally functioning bioprosthetic AVR, mild LVH, dilated LA. \par      - discussed with patient SBE prophylaxis per AHA guidelines\par      - will follow with serial echocardiograms\par \par 3. HTN: ? secondary to Cushing's syndrome: BP at ACC/AHA 2017 guideline target\par      - continue amlodipine 10mg po QD\par      - continue losartan//25mg po QD\par \par 8. PAD: s/p 08/18/20: sono: left XAVIER 100% mid, left CFA 20-49% stenosis, right CFA <20% stenosis. \par      - will pursue aggressive medical management     \par      - will follow with serial bilateral lower extremity arterial sonography\par \par 4. Dyslipidemia: lipids at goal\par      - continue rosuvastatin 40mg po QD\par \par 5. 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 \par \par 6. Antiphospholipid syndrome: no history of arterial or venous thrombosis:\par      - follow up with hematologist\par \par 7. Sick sinus syndrome: likely chronotropic incompetence\par     - will have PPM implanted with Franchesca Murcia MD on 08/20/20\par \par \par

## 2020-08-18 NOTE — REASON FOR VISIT
[Follow-Up - Clinic] : a clinic follow-up of [FreeTextEntry1] : Diagnostic Tests:\par ----------------------------------\par ECG: \par 08/03/20: sinus rhythm, incomplete RBBB. \par 07/13/20: sinus rhythm, incomplete RBBB. \par 06/16/20: sinus bradycardia at 42 bpm, incomplete RBBB. \par 05/28/20: sinus bradycardia at 53 bpm, incomplete RBBB. \par 03/03/20: marked sinus bradycardia at 49 bpm, incomplete RBBB. \par ----------------------------------\par MR:\par 08/12/19: abdomen: left adrenal nodule 2.7cm, 2cm right renal cell carcinoma, hepatic steatosis. \par ----------------------------------\par CT:\par 06/16/20: CTCA: SVG-distal RCA patent, SVG-OM occluded, LM normal, LAD prox severe, mid moderate, D1 moderate, D2 mild, LCx mild, OM1 severe, LPL normal, RCA prox severe, mid occluded, distal with patent graft.\par 03/10/20: CT abdomen/pelvis: s/p excision right renal mass, cyst lateral to aortic bifurcation, aorta-iliac atherosclerotic calcification. \par 08/09/19: CTA chest/abdomen/pelvis: no aortic dissection, + severe aortic and coronary plaque, aortic valve calcifications, 3.6cm left adrenal mass, 2cm right renal mass. \par ---------------------------------\par Echo:\par 05/22/20: EF 67%, normally functioning bioprosthetic AVR, mild LVH, dilated LA. 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 \par 01/1019: EF 55%, LVH, dilated LA, bioprosthetic AVR normally functioning, dilated ascending aorta. \par ---------------------------------\par Stress:\par 03/09/20: exercise stress echo: EF 63%, mild MR, s/p bioprosthetic AVR, normal wall motion, PA pressures, and ECG post 10.1 METs. \par 07/21/15: ETT: 7 minutes of Wilson, no ischemia. \par ---------------------------------\par Cath:\par 07/02/20: LM mild, LAD prox and mid 80% (FFR +) underwent PCI, LCx mild, OM1 90% underwent PCI, RCA prox 100%, patent SVG-RCA, occluded SVG-OM1.  \par 03/03/15: LM normal, LAD prox 60%, distal 70%, LCX 80%, OM1 80%, RCA prox 60%, AV peak-to-peak 49mmHg, MARYLOU 0.9cm2.\par ---------------------------------\par Monitor:\par 06/04/20 to 06/30/20: [employer] MCT: HR 25/50/120, <1% PVCs, <1% APCS, 18 pauses > 2 seconds, longest > 4 seconds, no AF.\par ---------------------------------\par Lower extremity arteries:\par 08/18/20: sono: left XAVIER 100% mid, left CFA 20-49% stenosis, right CFA <20% stenosis.

## 2020-08-18 NOTE — PHYSICAL EXAM
[General Appearance - Well Developed] : well developed [Well Groomed] : well groomed [Normal Appearance] : normal appearance [No Deformities] : no deformities [General Appearance - Well Nourished] : well nourished [Normal Conjunctiva] : the conjunctiva exhibited no abnormalities [Eyelids - No Xanthelasma] : the eyelids demonstrated no xanthelasmas [General Appearance - In No Acute Distress] : no acute distress [Normal Oral Mucosa] : normal oral mucosa [No Oral Pallor] : no oral pallor [No Oral Cyanosis] : no oral cyanosis [Normal Jugular Venous A Waves Present] : normal jugular venous A waves present [Normal Jugular Venous V Waves Present] : normal jugular venous V waves present [Respiration, Rhythm And Depth] : normal respiratory rhythm and effort [No Jugular Venous Barreto A Waves] : no jugular venous barreto A waves [Auscultation Breath Sounds / Voice Sounds] : lungs were clear to auscultation bilaterally [Exaggerated Use Of Accessory Muscles For Inspiration] : no accessory muscle use [Abdomen Soft] : soft [Abdomen Tenderness] : non-tender [Abdomen Mass (___ Cm)] : no abdominal mass palpated [Abnormal Walk] : normal gait [Gait - Sufficient For Exercise Testing] : the gait was sufficient for exercise testing [Cyanosis, Localized] : no localized cyanosis [Nail Clubbing] : no clubbing of the fingernails [Petechial Hemorrhages (___cm)] : no petechial hemorrhages [Skin Color & Pigmentation] : normal skin color and pigmentation [Skin Lesions] : no skin lesions [No Venous Stasis] : no venous stasis [] : no rash [No Xanthoma] : no  xanthoma was observed [No Skin Ulcers] : no skin ulcer [Affect] : the affect was normal [Mood] : the mood was normal [Oriented To Time, Place, And Person] : oriented to person, place, and time [Normal S1] : normal S1 [Bradycardia] : bradycardic [No Anxiety] : not feeling anxious [Normal S2] : normal S2 [Crescendo-Decrescendo] : crescendo-decrescendo [II] : a grade 2 [No Abnormalities] : the abdominal aorta was not enlarged and no bruit was heard [No Pitting Edema] : no pitting edema present [2+] : left 2+ [FreeTextEntry1] : Right thigh healing bruise [S3] : no S3 [S4] : no S4 [Right Carotid Bruit] : no bruit heard over the right carotid [Left Carotid Bruit] : no bruit heard over the left carotid [Right Femoral Bruit] : no bruit heard over the right femoral artery [Left Femoral Bruit] : no bruit heard over the left femoral artery

## 2020-08-20 ENCOUNTER — RESULT REVIEW (OUTPATIENT)
Age: 75
End: 2020-08-20

## 2020-08-20 ENCOUNTER — OUTPATIENT (OUTPATIENT)
Dept: OUTPATIENT SERVICES | Facility: HOSPITAL | Age: 75
LOS: 1 days | Discharge: HOME | End: 2020-08-20
Payer: MEDICARE

## 2020-08-20 DIAGNOSIS — I10 ESSENTIAL (PRIMARY) HYPERTENSION: ICD-10-CM

## 2020-08-20 DIAGNOSIS — Z98.890 OTHER SPECIFIED POSTPROCEDURAL STATES: Chronic | ICD-10-CM

## 2020-08-20 DIAGNOSIS — I49.5 SICK SINUS SYNDROME: ICD-10-CM

## 2020-08-20 DIAGNOSIS — Z95.2 PRESENCE OF PROSTHETIC HEART VALVE: Chronic | ICD-10-CM

## 2020-08-20 DIAGNOSIS — Z90.49 ACQUIRED ABSENCE OF OTHER SPECIFIED PARTS OF DIGESTIVE TRACT: Chronic | ICD-10-CM

## 2020-08-20 PROCEDURE — C1889: CPT

## 2020-08-20 PROCEDURE — C1898: CPT

## 2020-08-20 PROCEDURE — C1894: CPT

## 2020-08-20 PROCEDURE — 33208 INSRT HEART PM ATRIAL & VENT: CPT | Mod: KX

## 2020-08-20 PROCEDURE — C1785: CPT

## 2020-08-20 PROCEDURE — 71046 X-RAY EXAM CHEST 2 VIEWS: CPT

## 2020-08-20 PROCEDURE — C1769: CPT

## 2020-08-20 PROCEDURE — 71046 X-RAY EXAM CHEST 2 VIEWS: CPT | Mod: 26

## 2020-08-20 RX ORDER — LOSARTAN POTASSIUM 100 MG/1
100 TABLET, FILM COATED ORAL DAILY
Refills: 0 | Status: DISCONTINUED | OUTPATIENT
Start: 2020-08-20 | End: 2020-08-20

## 2020-08-20 RX ORDER — ACETAMINOPHEN 500 MG
650 TABLET ORAL EVERY 6 HOURS
Refills: 0 | Status: DISCONTINUED | OUTPATIENT
Start: 2020-08-20 | End: 2020-08-20

## 2020-08-20 RX ORDER — ASPIRIN/CALCIUM CARB/MAGNESIUM 324 MG
81 TABLET ORAL DAILY
Refills: 0 | Status: DISCONTINUED | OUTPATIENT
Start: 2020-08-20 | End: 2020-08-20

## 2020-08-20 RX ORDER — CEPHALEXIN 500 MG
1 CAPSULE ORAL
Qty: 10 | Refills: 0
Start: 2020-08-20 | End: 2020-08-24

## 2020-08-20 RX ORDER — CLOPIDOGREL BISULFATE 75 MG/1
75 TABLET, FILM COATED ORAL DAILY
Refills: 0 | Status: DISCONTINUED | OUTPATIENT
Start: 2020-08-20 | End: 2020-08-20

## 2020-08-20 RX ORDER — LOSARTAN/HYDROCHLOROTHIAZIDE 100MG-25MG
0 TABLET ORAL
Qty: 0 | Refills: 0 | DISCHARGE

## 2020-08-20 RX ORDER — CEFAZOLIN SODIUM 1 G
VIAL (EA) INJECTION
Refills: 0 | Status: DISCONTINUED | OUTPATIENT
Start: 2020-08-20 | End: 2020-08-20

## 2020-08-20 RX ORDER — CEFAZOLIN SODIUM 1 G
1000 VIAL (EA) INJECTION ONCE
Refills: 0 | Status: COMPLETED | OUTPATIENT
Start: 2020-08-20 | End: 2020-08-20

## 2020-08-20 RX ORDER — CEFAZOLIN SODIUM 1 G
1000 VIAL (EA) INJECTION EVERY 8 HOURS
Refills: 0 | Status: DISCONTINUED | OUTPATIENT
Start: 2020-08-20 | End: 2020-08-20

## 2020-08-20 RX ORDER — DIAZEPAM 5 MG
1 TABLET ORAL
Qty: 0 | Refills: 0 | DISCHARGE

## 2020-08-20 RX ORDER — HYDROCHLOROTHIAZIDE 25 MG
25 TABLET ORAL DAILY
Refills: 0 | Status: DISCONTINUED | OUTPATIENT
Start: 2020-08-20 | End: 2020-08-20

## 2020-08-20 RX ORDER — AMLODIPINE BESYLATE 2.5 MG/1
10 TABLET ORAL DAILY
Refills: 0 | Status: DISCONTINUED | OUTPATIENT
Start: 2020-08-20 | End: 2020-08-20

## 2020-08-20 RX ADMIN — Medication 100 MILLIGRAM(S): at 08:58

## 2020-08-20 NOTE — H&P ADULT - NSICDXFAMILYHX_GEN_ALL_CORE_FT
FAMILY HISTORY:  Family history of ischemic heart disease, Family history of MI (myocardial infarction) in Father and Brother

## 2020-08-20 NOTE — H&P ADULT - NSICDXPASTMEDICALHX_GEN_ALL_CORE_FT
PAST MEDICAL HISTORY:  Antiphospholipid syndrome     Aortic valve disorder Aortic stenosis    CAD (coronary artery disease)     Calculus of kidney Kidney stones    Cushing's disease     Essential hypertension     Hyperlipidemia     Malignant melanoma of skin Melanoma on foot s/p surgical removal

## 2020-08-20 NOTE — H&P ADULT - NSICDXPASTSURGICALHX_GEN_ALL_CORE_FT
PAST SURGICAL HISTORY:  H/O cervical spine surgery     History of appendectomy     Malignant melanoma of skin extraction    S/P AVR (aortic valve replacement) 2015

## 2020-08-20 NOTE — H&P ADULT - ASSESSMENT
Pt. Presents to er with headache that he states feels like an aneurysm. He has a family history of aneurysm and states he is worried that is what is going on. He also states that earlier today, he was unable to use the right side of his body but he has full function of it at this time. He is ambulatory with strong and steady gait. A&ox4. Vitals are stable. Pt. Has full range of motion in all extremities. Neurovascular status intact. Pt. Denies any other symptoms. Pt. Is stable at this time.    74 y/o M with history of bioprosthetic AVR / CABG x 2 in 2015 at [place] (SVG-RCA and SVG-LCx), aortic atherosclerosis, HTN, right renal mass resection (benign), left adrenal mass, Cushing's disease likely secondary to pituitary tumor, and antiphospholipid syndrome. He underwent pituitary surgery on 5/16/20 at [facility]. While there during postop course, he was noted to have a 16 beat run of NSVT. Echo showed EF 67%, normally functioning bioprosthetic AVR, mild LVH, and a dilated LA.  He was initiated on Toprol XL 50mg QD.  He followed up with Dr. Martinez and was given an Event Monitor ([employer]) to wear from 6/4 to 7/3.  On 06/11/20 had 3.6 second pause during bowel movement noted on MCT monitor.   Patient presents today for PPM implant

## 2020-08-20 NOTE — H&P ADULT - HISTORY OF PRESENT ILLNESS
76 y/o M with history of bioprosthetic AVR / CABG x 2 in 2015 at [place] (SVG-RCA and SVG-LCx), aortic atherosclerosis, HTN, right renal mass resection (benign), left adrenal mass, Cushing's disease likely secondary to pituitary tumor, and antiphospholipid syndrome. He underwent pituitary surgery on 5/16/20 at [facility]. While there during postop course, he was noted to have a 16 beat run of NSVT. Echo showed EF 67%, normally functioning bioprosthetic AVR, mild LVH, and a dilated LA.  He was initiated on Toprol XL 50mg QD.  He followed up with Dr. Martinez and was given an Event Monitor ([employer]) to wear from 6/4 to 7/3.  On 06/11/20 had 3.6 second pause during bowel movement noted on MCT monitor.   Patient presents today for PPM implant

## 2020-09-17 ENCOUNTER — APPOINTMENT (OUTPATIENT)
Dept: HEART AND VASCULAR | Facility: CLINIC | Age: 75
End: 2020-09-17
Payer: MEDICARE

## 2020-09-17 ENCOUNTER — APPOINTMENT (OUTPATIENT)
Dept: HEART AND VASCULAR | Facility: CLINIC | Age: 75
End: 2020-09-17

## 2020-09-17 PROCEDURE — 99024 POSTOP FOLLOW-UP VISIT: CPT

## 2020-09-17 PROCEDURE — 99212 OFFICE O/P EST SF 10 MIN: CPT | Mod: 25,95

## 2020-09-17 NOTE — PHYSICAL EXAM
[FreeTextEntry1] : No LE edema  [Normal Appearance] : normal appearance [General Appearance - In No Acute Distress] : no acute distress [Heart Sounds] : normal S1 and S2 [Murmurs] : no murmurs present [Arterial Pulses Normal] : the arterial pulses were normal [Edema] : no peripheral edema present [Respiration, Rhythm And Depth] : normal respiratory rhythm and effort [Auscultation Breath Sounds / Voice Sounds] : lungs were clear to auscultation bilaterally [Bowel Sounds] : normal bowel sounds [Abdomen Soft] : soft [Abdomen Tenderness] : non-tender [Abdomen Mass (___ Cm)] : no abdominal mass palpated [Normal Conjunctiva] : the conjunctiva exhibited no abnormalities [Normal Oral Mucosa] : normal oral mucosa [Normal Jugular Venous V Waves Present] : normal jugular venous V waves present [Abnormal Walk] : normal gait [Skin Turgor] : normal skin turgor [] : no rash [Oriented To Time, Place, And Person] : oriented to person, place, and time [Impaired Insight] : insight and judgment were intact [Affect] : the affect was normal [No Anxiety] : not feeling anxious

## 2020-09-21 NOTE — DISCUSSION/SUMMARY
[Pacemaker Function Normal] : normal pacemaker function [Patient] : the patient [Family] : the patient's family [de-identified] : RTO in 4 months

## 2020-09-21 NOTE — PROCEDURE
[No] : not [Sinus Bradycardia] : sinus bradycardia [Pacemaker] : pacemaker [[employer]] : [employer] [Threshold Testing Performed] : Threshold testing was performed [Lead Imp:  ___ohms] : lead impedance was [unfilled] ohms [Sensing Amplitude ___mv] : sensing amplitude was [unfilled] mv [___V @] : [unfilled] V [___ ms] : [unfilled] ms [None] : none [de-identified] : Shemar 8-DRT [de-identified] : [id number] [de-identified] : 8/20/2020 [de-identified] : DDD-CLS [de-identified] :  [de-identified] : 100% remaining battery  [de-identified] : AP 98%\par  0%\par No A or V events

## 2020-09-21 NOTE — REASON FOR VISIT
[Home] : at home, [unfilled] , at the time of the visit. [Medical Office: ([facility])___] : at the medical office located in  [Follow-up Device Check] : follow-up device check visit [Other ___] : [unfilled] [Spouse] : spouse

## 2020-09-21 NOTE — HISTORY OF PRESENT ILLNESS
[FreeTextEntry1] : Mr. Castorena is a 74 y/o M with history of bioprosthetic AVR / CABG x 2 in 2015 at [place] (SVG-RCA and SVG-LCx), aortic atherosclerosis, HTN, right renal mass resection (benign), left adrenal mass, Cushing's disease likely secondary to pituitary tumor s/p surgery at [facility] 5/2020, antiphospholipid syndrome, and sinus pauses now s/p PPM-D (Biotronik) 8/20/2020. He presents today for telehealth. \par \par He reports that he continues to feel some fatigue/sob with exertion. He was hoping that the pacemaker implant would improve these symptoms. Otherwise feels well. Incision healing well with no bleeding or hematoma. \par \par The patient had pituitary surgery at [facility] on 5/2020. During postop course, he was noted to have a 16 beat run of NSVT. Echo showed EF 67%, normally functioning bioprosthetic AVR, mild LVH, and a dilated LA.  He was initiated on Toprol XL 50mg QD.  He followed up with Dr. Martinez and was given an Event Monitor ([employer]) to wear from 6/4 to 7/3.  On 06/11/20 had 3.6 second pause during bowel movement noted on MCT monitor.  He was thus referred to us for initial consult for this and subsequently underwent ppm implant.\par \par Pertinent outside record presented today: \par EKG 5/22/20 ([facility]): Accelerated ventricular rhythm at 72 bpm \par

## 2020-11-03 PROBLEM — Z95.0 ARTIFICIAL CARDIAC PACEMAKER: Noted: 2020-09-21

## 2020-11-04 ENCOUNTER — APPOINTMENT (OUTPATIENT)
Dept: HEART AND VASCULAR | Facility: CLINIC | Age: 75
End: 2020-11-04
Payer: MEDICARE

## 2020-11-04 VITALS
WEIGHT: 178.13 LBS | BODY MASS INDEX: 25.5 KG/M2 | DIASTOLIC BLOOD PRESSURE: 70 MMHG | OXYGEN SATURATION: 98 % | TEMPERATURE: 98.3 F | SYSTOLIC BLOOD PRESSURE: 108 MMHG | HEART RATE: 79 BPM | HEIGHT: 70 IN

## 2020-11-04 DIAGNOSIS — Z95.0 PRESENCE OF CARDIAC PACEMAKER: ICD-10-CM

## 2020-11-04 PROCEDURE — 99215 OFFICE O/P EST HI 40 MIN: CPT

## 2020-11-04 NOTE — HISTORY OF PRESENT ILLNESS
[FreeTextEntry1] : Mr. Castorena presents for follow up and management of CAD s/p CABG x 2 (04/12/15 at time of AVR) s/p PCI prox/mid LAD and OM1 (07/02/20), aortic stenosis s/p bioprosthetic AVR (04/12/15), aortic atherosclerosis, HTN, Cushing's syndrome, PAD, sick sinus syndrome s/p Biotronik PPM (08/20/20), and antiphospholipid syndrome.   He was previously followed by Guillermo Muir MD.  On 04/12/15 he underwent a bioprosthetic AVR (# 25 Medtronic porcine) and CABG x 2 with Russ Lester MD at [facility].  In August, 2019 he had complaints of abdominal pain and underwent a CTA chest/abdomen/pelvis on 08/19/19 which revealed no aortic dissection, + severe aortic and coronary plaque, aortic valve calcifications, a 3.6cm left adrenal mass, and 2cm right renal mass.  He had removal of the right renal mass which was benign.   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  He is following with a neurosurgeon, Carol Flores MD, and a head and neck surgeon, Javier Arguelles MD, at [facility] and had pituitary surgery on 05/16/20. He was admitted for 5 days in the post operative period mostly for cardiac observation. On 05/26/20 I received a call from a cardiologist at [facility], Angelica Katz MD, informing me of a 16 beat run of NSVT during his post operative course.  The run was asymptomatic.  He had an echocardiogram on 05/22/20 which revealed an EF of 67%, normally functioning bioprosthetic AVR, mild LVH, and a dilated LA.  He was initiated on Toprol XL 50mg po QD.  On 06/11/20 had a 3.6 second pause during bowel movement noted on MCT monitor.  We agreed to see a heart rhythm specialist. He was evaluated by Franchesca Murcia MD and was tapered off beta blockers.  On 06/16/20 he underwent a CTCA which revealed SVG-distal RCA patent, SVG-OM occluded, LM normal, LAD prox severe, mid moderate, D1 moderate, D2 mild, LCx mild, OM1 severe, LPL normal, RCA prox severe, mid occluded, and distal with patent graft.  He underwent invasive coronary angiography on 07/02/20 which revealed LM mild, LAD prox and mid 80% (FFR +) underwent PCI, LCx mild, OM1 90% underwent PCI, RCA prox 100%, patent SVG-RCA, occluded SVG-OM1.  The following day he had a groin bleed and a sinus pause overnight but otherwise did well.  On 08/18/20 he underwent bilateral lower extremity arterial sonography which revealed left XAVIER 100% mid, left CFA 20-49% stenosis, and right CFA <20% stenosis.  He underwent implantation of a Bitronik dual chamber pacemaker on 08/20/20.  At this time, his TEIXEIRA has markedly improved and he feels generally well.  \par

## 2020-11-04 NOTE — REASON FOR VISIT
[Follow-Up - Clinic] : a clinic follow-up of [FreeTextEntry1] : Diagnostic Tests:\par ----------------------------------\par ECG: \par 08/03/20: sinus rhythm, incomplete RBBB. \par 07/13/20: sinus rhythm, incomplete RBBB. \par 06/16/20: sinus bradycardia at 42 bpm, incomplete RBBB. \par 05/28/20: sinus bradycardia at 53 bpm, incomplete RBBB. \par 03/03/20: marked sinus bradycardia at 49 bpm, incomplete RBBB. \par ----------------------------------\par MR:\par 08/12/19: abdomen: left adrenal nodule 2.7cm, 2cm right renal cell carcinoma, hepatic steatosis. \par ----------------------------------\par CT:\par 06/16/20: CTCA: SVG-distal RCA patent, SVG-OM occluded, LM normal, LAD prox severe, mid moderate, D1 moderate, D2 mild, LCx mild, OM1 severe, LPL normal, RCA prox severe, mid occluded, distal with patent graft.\par 03/10/20: CT abdomen/pelvis: s/p excision right renal mass, cyst lateral to aortic bifurcation, aorta-iliac atherosclerotic calcification. \par 08/09/19: CTA chest/abdomen/pelvis: no aortic dissection, + severe aortic and coronary plaque, aortic valve calcifications, 3.6cm left adrenal mass, 2cm right renal mass. \par ---------------------------------\par Echo:\par 05/22/20: EF 67%, normally functioning bioprosthetic AVR, mild LVH, dilated LA. 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 \par 01/1019: EF 55%, LVH, dilated LA, bioprosthetic AVR normally functioning, dilated ascending aorta. \par ---------------------------------\par Stress:\par 03/09/20: exercise stress echo: EF 63%, mild MR, s/p bioprosthetic AVR, normal wall motion, PA pressures, and ECG post 10.1 METs. \par 07/21/15: ETT: 7 minutes of Wilson, no ischemia. \par ---------------------------------\par Cath:\par 07/02/20: LM mild, LAD prox and mid 80% (FFR +) underwent PCI, LCx mild, OM1 90% underwent PCI, RCA prox 100%, patent SVG-RCA, occluded SVG-OM1.  \par 03/03/15: LM normal, LAD prox 60%, distal 70%, LCX 80%, OM1 80%, RCA prox 60%, AV peak-to-peak 49mmHg, MARYLOU 0.9cm2.\par ---------------------------------\par Monitor:\par 06/04/20 to 06/30/20: [employer] MCT: HR 25/50/120, <1% PVCs, <1% APCS, 18 pauses > 2 seconds, longest > 4 seconds, no AF.\par ---------------------------------\par Lower extremity arteries:\par 08/18/20: sono: left XAVIER 100% mid, left CFA 20-49% stenosis, right CFA <20% stenosis.

## 2020-11-04 NOTE — REVIEW OF SYSTEMS
[Negative] : Heme/Lymph [Dyspnea on exertion] : dyspnea during exertion [Shortness Of Breath] : no shortness of breath [Dizziness] : no dizziness [FreeTextEntry1] : + cold feet

## 2020-11-04 NOTE — ASSESSMENT
[FreeTextEntry1] : 1. CAD: s/p CABG x 2 at time of AVR 04/25/15, SVG-LCx, SVG-RCA, Russ Lester MD at [facility]: 07/02/20: PCI prox/mid LAD and OM1.  \par      - will pursue aggressive medical management\par      - continue DAPT with ASA 81mg p QD and clopidogrel 75mg po QD x 1 year (07/2021)\par \par 2. Aortic stenosis: s/p bioprosthetic AVR, #25 Medtronic porcine, Russ Lester MD at [facility] 04/25/15, s/p echo 05/22/20: EF 67%, normally functioning bioprosthetic AVR, mild LVH, dilated LA. \par      - discussed with patient SBE prophylaxis per AHA guidelines\par      - will follow with serial echocardiograms (ordered today)\par \par 3. HTN: ? secondary to Cushing's syndrome: BP at ACC/AHA 2017 guideline target\par      - continue amlodipine 10mg po QD\par      - continue losartan//25mg po QD\par \par 8. PAD: s/p 08/18/20: sono: left XAVIER 100% mid, left CFA 20-49% stenosis, right CFA <20% stenosis. \par      - will pursue aggressive medical management     \par      - will follow with serial bilateral lower extremity arterial sonography \par \par 4. Dyslipidemia: lipids at goal\par      - continue rosuvastatin 40mg po QD\par \par 5. 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 Antiphospholipid syndrome: no history of arterial or venous thrombosis:\par      - follow up with hematologist\par \par 7. Sick sinus syndrome: likely chronotropic incompetence\par     - s/p Biotronik dual chamber PPM with Franchesca Murcia MD on 08/20/20\par     - follow up with device clinic\par \par 8. r/o ASHWIN:\par      - will send for a carotid sonogram (will order at time of next echo)\par \par \par

## 2021-02-16 ENCOUNTER — NON-APPOINTMENT (OUTPATIENT)
Age: 76
End: 2021-02-16

## 2021-03-22 ENCOUNTER — RX RENEWAL (OUTPATIENT)
Age: 76
End: 2021-03-22

## 2021-04-14 ENCOUNTER — APPOINTMENT (OUTPATIENT)
Dept: HEART AND VASCULAR | Facility: CLINIC | Age: 76
End: 2021-04-14
Payer: MEDICARE

## 2021-04-14 ENCOUNTER — NON-APPOINTMENT (OUTPATIENT)
Age: 76
End: 2021-04-14

## 2021-04-14 VITALS
SYSTOLIC BLOOD PRESSURE: 152 MMHG | WEIGHT: 176 LBS | DIASTOLIC BLOOD PRESSURE: 81 MMHG | BODY MASS INDEX: 25.25 KG/M2 | HEART RATE: 87 BPM

## 2021-04-14 VITALS — DIASTOLIC BLOOD PRESSURE: 70 MMHG | SYSTOLIC BLOOD PRESSURE: 125 MMHG

## 2021-04-14 DIAGNOSIS — H43.812 VITREOUS DEGENERATION, LEFT EYE: ICD-10-CM

## 2021-04-14 DIAGNOSIS — Z86.79 PERSONAL HISTORY OF OTHER DISEASES OF THE CIRCULATORY SYSTEM: ICD-10-CM

## 2021-04-14 PROCEDURE — 99215 OFFICE O/P EST HI 40 MIN: CPT | Mod: 25

## 2021-04-14 PROCEDURE — 93000 ELECTROCARDIOGRAM COMPLETE: CPT

## 2021-04-14 NOTE — REVIEW OF SYSTEMS
[Dyspnea on exertion] : dyspnea during exertion [Negative] : Heme/Lymph [Shortness Of Breath] : no shortness of breath [Dizziness] : no dizziness [FreeTextEntry1] : + cold feet

## 2021-04-14 NOTE — PHYSICAL EXAM
[General Appearance - Well Developed] : well developed [Normal Appearance] : normal appearance [Well Groomed] : well groomed [General Appearance - Well Nourished] : well nourished [No Deformities] : no deformities [General Appearance - In No Acute Distress] : no acute distress [Normal Conjunctiva] : the conjunctiva exhibited no abnormalities [Eyelids - No Xanthelasma] : the eyelids demonstrated no xanthelasmas [Normal Oral Mucosa] : normal oral mucosa [No Oral Pallor] : no oral pallor [No Oral Cyanosis] : no oral cyanosis [Normal Jugular Venous A Waves Present] : normal jugular venous A waves present [Normal Jugular Venous V Waves Present] : normal jugular venous V waves present [No Jugular Venous Barreto A Waves] : no jugular venous barreto A waves [Respiration, Rhythm And Depth] : normal respiratory rhythm and effort [Exaggerated Use Of Accessory Muscles For Inspiration] : no accessory muscle use [Auscultation Breath Sounds / Voice Sounds] : lungs were clear to auscultation bilaterally [Abdomen Soft] : soft [Abdomen Tenderness] : non-tender [Abdomen Mass (___ Cm)] : no abdominal mass palpated [Abnormal Walk] : normal gait [Gait - Sufficient For Exercise Testing] : the gait was sufficient for exercise testing [Nail Clubbing] : no clubbing of the fingernails [Cyanosis, Localized] : no localized cyanosis [Petechial Hemorrhages (___cm)] : no petechial hemorrhages [Skin Color & Pigmentation] : normal skin color and pigmentation [] : no rash [No Venous Stasis] : no venous stasis [Skin Lesions] : no skin lesions [No Skin Ulcers] : no skin ulcer [No Xanthoma] : no  xanthoma was observed [Oriented To Time, Place, And Person] : oriented to person, place, and time [Affect] : the affect was normal [Mood] : the mood was normal [No Anxiety] : not feeling anxious [Bradycardia] : bradycardic [Normal S1] : normal S1 [Normal S2] : normal S2 [II] : a grade 2 [Crescendo-Decrescendo] : crescendo-decrescendo [2+] : left 2+ [No Abnormalities] : the abdominal aorta was not enlarged and no bruit was heard [No Pitting Edema] : no pitting edema present [FreeTextEntry1] : Right thigh healing bruise [S3] : no S3 [S4] : no S4 [Right Carotid Bruit] : no bruit heard over the right carotid [Left Carotid Bruit] : no bruit heard over the left carotid [Right Femoral Bruit] : no bruit heard over the right femoral artery [Left Femoral Bruit] : no bruit heard over the left femoral artery

## 2021-04-14 NOTE — ASSESSMENT
[FreeTextEntry1] : 1. CAD: s/p CABG x 2 at time of AVR 04/25/15, SVG-LCx, SVG-RCA, Russ Lester MD at [facility]: 07/02/20: PCI prox/mid LAD and OM1.  \par      - will pursue aggressive medical management\par      - continue DAPT with ASA 81mg p QD and clopidogrel 75mg po QD x 1 year (07/2021)\par \par 2. Aortic stenosis: s/p bioprosthetic AVR, #25 Medtronic porcine, Russ Lester MD at [facility] 04/25/15, s/p echo 05/22/20: EF 67%, normally functioning bioprosthetic AVR, mild LVH, dilated LA. \par      - discussed with patient SBE prophylaxis per AHA guidelines\par      - will follow with serial echocardiograms (ordered today)\par \par 3. HTN: ? secondary to Cushing's syndrome: BP at ACC/AHA 2017 guideline target\par      - continue amlodipine 10mg po QD\par      - continue losartan//25mg po QD\par \par 4. PAD: s/p 08/18/20: sono: left XAVIER 100% mid, left CFA 20-49% stenosis, right CFA <20% stenosis. \par      - will pursue aggressive medical management     \par      - will follow with serial bilateral lower extremity arterial sonography (ordered today)\par \par 5. Dyslipidemia: lipids at goal\par      - continue rosuvastatin 40mg po QD\par \par 6. 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 Antiphospholipid syndrome: no history of arterial or venous thrombosis:\par      - follow up with hematologist\par \par 8. Sick sinus syndrome: likely chronotropic incompetence\par     - s/p Biotronik dual chamber PPM with Franchesca Murcia MD on 08/20/20\par     - follow up with device clinic\par \par 9. r/o ASHWIN:\par      - will send for a carotid sonogram (ordered today) \par \par \par

## 2021-04-14 NOTE — REASON FOR VISIT
[Follow-Up - Clinic] : a clinic follow-up of [FreeTextEntry1] : Diagnostic Tests:\par ----------------------------------\par ECG: \par 04/14/21: A-paced, V-sensed, RSR' V1 without ST segment elevation. \par 08/03/20: sinus rhythm, incomplete RBBB. \par 07/13/20: sinus rhythm, incomplete RBBB. \par 06/16/20: sinus bradycardia at 42 bpm, incomplete RBBB. \par 05/28/20: sinus bradycardia at 53 bpm, incomplete RBBB. \par 03/03/20: marked sinus bradycardia at 49 bpm, incomplete RBBB. \par ----------------------------------\par MR:\par 08/12/19: abdomen: left adrenal nodule 2.7cm, 2cm right renal cell carcinoma, hepatic steatosis. \par ----------------------------------\par CT:\par 06/16/20: CTCA: SVG-distal RCA patent, SVG-OM occluded, LM normal, LAD prox severe, mid moderate, D1 moderate, D2 mild, LCx mild, OM1 severe, LPL normal, RCA prox severe, mid occluded, distal with patent graft.\par 03/10/20: CT abdomen/pelvis: s/p excision right renal mass, cyst lateral to aortic bifurcation, aorta-iliac atherosclerotic calcification. \par 08/09/19: CTA chest/abdomen/pelvis: no aortic dissection, + severe aortic and coronary plaque, aortic valve calcifications, 3.6cm left adrenal mass, 2cm right renal mass. \par ---------------------------------\par Echo:\par 05/22/20: EF 67%, normally functioning bioprosthetic AVR, mild LVH, dilated LA. 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 \par 01/1019: EF 55%, LVH, dilated LA, bioprosthetic AVR normally functioning, dilated ascending aorta. \par ---------------------------------\par Stress:\par 03/09/20: exercise stress echo: EF 63%, mild MR, s/p bioprosthetic AVR, normal wall motion, PA pressures, and ECG post 10.1 METs. \par 07/21/15: ETT: 7 minutes of Wilson, no ischemia. \par ---------------------------------\par Cath:\par 07/02/20: LM mild, LAD prox and mid 80% (FFR +) underwent PCI, LCx mild, OM1 90% underwent PCI, RCA prox 100%, patent SVG-RCA, occluded SVG-OM1.  \par 03/03/15: LM normal, LAD prox 60%, distal 70%, LCX 80%, OM1 80%, RCA prox 60%, AV peak-to-peak 49mmHg, MARYLOU 0.9cm2.\par ---------------------------------\par Monitor:\par 06/04/20 to 06/30/20: Biotel MCT: HR 25/50/120, <1% PVCs, <1% APCS, 18 pauses > 2 seconds, longest > 4 seconds, no AF.\par ---------------------------------\par Lower extremity arteries:\par 08/18/20: sono: left XAVIER 100% mid, left CFA 20-49% stenosis, right CFA <20% stenosis.

## 2021-04-14 NOTE — HISTORY OF PRESENT ILLNESS
[FreeTextEntry1] : Mr. Castorena presents for follow up and management of CAD s/p CABG x 2 (04/12/15 at time of AVR) s/p PCI prox/mid LAD and OM1 (07/02/20), aortic stenosis s/p bioprosthetic AVR (04/12/15), aortic atherosclerosis, HTN, Cushing's syndrome, PAD, sick sinus syndrome s/p Biotronik PPM (08/20/20), and antiphospholipid syndrome.   He was previously followed by Guillermo Muir MD.  On 04/12/15 he underwent a bioprosthetic AVR (# 25 Medtronic porcine) and CABG x 2 with Russ Lester MD at [facility].  In August, 2019 he had complaints of abdominal pain and underwent a CTA chest/abdomen/pelvis on 08/19/19 which revealed no aortic dissection, + severe aortic and coronary plaque, aortic valve calcifications, a 3.6cm left adrenal mass, and 2cm right renal mass.  He had removal of the right renal mass which was benign.   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  He is following with a neurosurgeon, Carol Flores MD, and a head and neck surgeon, Javier Arguelles MD, at [facility] and had pituitary surgery on 05/16/20. He was admitted for 5 days in the post operative period mostly for cardiac observation. On 05/26/20 I received a call from a cardiologist [place] [facility] Angelica Katz MD, informing me of a 16 beat run of NSVT during his post operative course.  The run was asymptomatic.  He had an echocardiogram on 05/22/20 which revealed an EF of 67%, normally functioning bioprosthetic AVR, mild LVH, and a dilated LA.  He was initiated on Toprol XL 50mg po QD.  On 06/11/20 had a 3.6 second pause during bowel movement noted on MCT monitor.  We agreed to see a heart rhythm specialist. He was evaluated by Franchesca Murcia MD and was tapered off beta blockers.  On 06/16/20 he underwent a CTCA which revealed SVG-distal RCA patent, SVG-OM occluded, LM normal, LAD prox severe, mid moderate, D1 moderate, D2 mild, LCx mild, OM1 severe, LPL normal, RCA prox severe, mid occluded, and distal with patent graft.  He underwent invasive coronary angiography on 07/02/20 which revealed LM mild, LAD prox and mid 80% (FFR +) underwent PCI, LCx mild, OM1 90% underwent PCI, RCA prox 100%, patent SVG-RCA, occluded SVG-OM1.  The following day he had a groin bleed and a sinus pause overnight but otherwise did well.  On 08/18/20 he underwent bilateral lower extremity arterial sonography which revealed left XAVIER 100% mid, left CFA 20-49% stenosis, and right CFA <20% stenosis.  He underwent implantation of a Bitronik dual chamber pacemaker on 08/20/20.  At this time, his TEIXEIRA has markedly improved and he feels generally well.  \par

## 2021-04-15 LAB
ALBUMIN SERPL ELPH-MCNC: 4.7 G/DL
ALP BLD-CCNC: 64 U/L
ALT SERPL-CCNC: 45 U/L
ANION GAP SERPL CALC-SCNC: 16 MMOL/L
AST SERPL-CCNC: 34 U/L
BASOPHILS # BLD AUTO: 0.05 K/UL
BASOPHILS NFR BLD AUTO: 0.7 %
BILIRUB SERPL-MCNC: 1 MG/DL
BUN SERPL-MCNC: 17 MG/DL
CALCIUM SERPL-MCNC: 9.8 MG/DL
CHLORIDE SERPL-SCNC: 102 MMOL/L
CHOLEST SERPL-MCNC: 110 MG/DL
CO2 SERPL-SCNC: 25 MMOL/L
CREAT SERPL-MCNC: 0.82 MG/DL
EOSINOPHIL # BLD AUTO: 0.11 K/UL
EOSINOPHIL NFR BLD AUTO: 1.5 %
ESTIMATED AVERAGE GLUCOSE: 108 MG/DL
GLUCOSE SERPL-MCNC: 91 MG/DL
HBA1C MFR BLD HPLC: 5.4 %
HCT VFR BLD CALC: 44.1 %
HDLC SERPL-MCNC: 43 MG/DL
HGB BLD-MCNC: 14.7 G/DL
IMM GRANULOCYTES NFR BLD AUTO: 0.3 %
LDLC SERPL CALC-MCNC: 46 MG/DL
LDLC SERPL DIRECT ASSAY-MCNC: 47 MG/DL
LYMPHOCYTES # BLD AUTO: 1.39 K/UL
LYMPHOCYTES NFR BLD AUTO: 19.5 %
MAN DIFF?: NORMAL
MCHC RBC-ENTMCNC: 30.9 PG
MCHC RBC-ENTMCNC: 33.3 GM/DL
MCV RBC AUTO: 92.6 FL
MONOCYTES # BLD AUTO: 0.8 K/UL
MONOCYTES NFR BLD AUTO: 11.2 %
NEUTROPHILS # BLD AUTO: 4.75 K/UL
NEUTROPHILS NFR BLD AUTO: 66.8 %
NONHDLC SERPL-MCNC: 67 MG/DL
PLATELET # BLD AUTO: 237 K/UL
POTASSIUM SERPL-SCNC: 4 MMOL/L
PROT SERPL-MCNC: 7.1 G/DL
RBC # BLD: 4.76 M/UL
RBC # FLD: 12.9 %
SODIUM SERPL-SCNC: 143 MMOL/L
TRIGL SERPL-MCNC: 104 MG/DL
TSH SERPL-ACNC: 2.6 UIU/ML
WBC # FLD AUTO: 7.12 K/UL

## 2021-04-19 ENCOUNTER — APPOINTMENT (OUTPATIENT)
Dept: UROLOGY | Facility: CLINIC | Age: 76
End: 2021-04-19
Payer: MEDICARE

## 2021-04-19 VITALS — SYSTOLIC BLOOD PRESSURE: 119 MMHG | TEMPERATURE: 97.5 F | DIASTOLIC BLOOD PRESSURE: 68 MMHG | HEART RATE: 79 BPM

## 2021-04-19 PROCEDURE — 99213 OFFICE O/P EST LOW 20 MIN: CPT

## 2021-04-19 NOTE — END OF VISIT
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 Known 3.5cm left adrenal mass, prior negative functional w/u. Needs repeat cross sectional imaging. \par Referral to Dr Starks for management of low T

## 2021-04-19 NOTE — HISTORY OF PRESENT ILLNESS
[None] : None [FreeTextEntry1] : 76 year old male with a 2cm right renal lesion and 3.6 cm left adrenal mass. He [place] partial [place] 12[place]20 [place] [facility] His pathology report shows oncocytoma 2.0 x 1.7 x 1.2cm with the resection margin free of tumor. He is presenting to the office today for low testosterone. \par \par + low libido \par \par Testosterone 12/15/20: \par 11.4 \par FSH:\par 13.37\par LH:\par 7.2 \par Prolactin:\par 9.5\par \par Medical history: CAD, previously had Cushing's disease, HTN, CARD, Anxiety, HLD\par Surgeries: Right partial nephrectomy, Pituitary, CAD x2 stents placed. Pacemaker placed, aortic valve replacement

## 2021-04-19 NOTE — ASSESSMENT
[FreeTextEntry1] : 76 year old male s/p R partial nephrectomy 12/2019 for 2cm oncocytoma, also with a 3.5cm left adrenal mass\par - no recent imaging \par - Adrenal CT protocol \par - Referral to Dr. Starks for low testosterone

## 2021-04-26 LAB — PSA SERPL-MCNC: 0.67 NG/ML

## 2021-04-27 ENCOUNTER — NON-APPOINTMENT (OUTPATIENT)
Age: 76
End: 2021-04-27

## 2021-05-04 ENCOUNTER — APPOINTMENT (OUTPATIENT)
Dept: UROLOGY | Facility: CLINIC | Age: 76
End: 2021-05-04
Payer: MEDICARE

## 2021-05-04 VITALS — DIASTOLIC BLOOD PRESSURE: 70 MMHG | HEART RATE: 76 BPM | TEMPERATURE: 97.8 F | SYSTOLIC BLOOD PRESSURE: 114 MMHG

## 2021-05-04 PROCEDURE — 99214 OFFICE O/P EST MOD 30 MIN: CPT

## 2021-05-04 NOTE — HISTORY OF PRESENT ILLNESS
[Nocturia] : nocturia [Erectile Dysfunction] : Erectile Dysfunction [None] : None [FreeTextEntry1] : This is a 76 year old male with PMH CAD s/p X3 stents, aortic valve replacement (on plavix), HTN, Cushing's disease s/p pituitary surgery,  partial right nephrectomy 12/2020 with  Dr Clarke at  [facility] [place] [place]par referred by Dr Calderon for low testosterone\par + low libido, + erectile dysfunction,  unable to achieve or maintain for 10 years. \par \par Hormone panel 12/15/20: \par Testosterone 11.4 \par FSH:13.37\par LH:7.2 \par Prolactin:9.5\par \par PSA 0.67 - April 2021 [Urinary Incontinence] : no urinary incontinence [Urinary Retention] : no urinary retention [Urinary Urgency] : no urinary urgency [Urinary Frequency] : no urinary frequency [Straining] : no straining [Weak Stream] : no weak stream [Dysuria] : no dysuria [Hematuria - Microscopic] : no microscopic hematuria [Hematuria - Gross] : no gross hematuria [Bladder Spasm] : no bladder spasm [Abdominal Pain] : no abdominal pain [Flank Pain] : no flank pain [Edema] : ~T edema was not present [Weight Loss] : no recent ~M [unfilled] weight loss [Fever] : no fever [Fatigue] : no fatigue [Nausea] : no nausea

## 2021-05-04 NOTE — ASSESSMENT
[FreeTextEntry1] : Hypogonadism\par castrate T leveles\par Cushing's disease s/p Pituitary surgery\par +low libido, +erectile dysfunction\par +low TT\par The role of testosterone replacement therapy was discussed as well. The controversy around TRT was discussed at length. Studies describing improvement in cardiovascular outcomes, improvement in diabetes outcomes, and improvement in bone density were discussed at length. In addition more recent studies demonstrating an increasing cardiovascular mortality with TRT were discussed. We spoke about the pitfalls of each of these studies. He understands that it is likely that he will have more of an improvement with the testosterone replacement and that the benefits outweigh the risks in his situation.\par \par He understands that TRT is contraindicated in anyone attempting pregnancy and will lead to azoospermia unless treated with medications such as Clomid or Arimidex. The unclear return of spermatogenic function after discontinuation of traditional TRT was also discussed. \par \par The various treatment options for testosterone replacement were discussed at length as well. They include intramuscular testosterone, transdermal testosterone gels, subcutaneous testosterone pellets, and certain oral medications. Risks of each treatment, including risk of transference to other family members, unproven - and highly unlikely -  risk of induction of prostate cancer, polycythemia, elevations in liver function tests among other risks were discussed at length.\par \par labs today :\par Hormone panel\par DEXA Scan\par he will discuss with his cardiologist and get back to me with final decision\par I strongly recommend he commence TRT\par he will get back to us with formulation decision as well\par Will repeat labs in 4 weeks once started\par Follow up in 6 weeks pending lab results\par

## 2021-05-04 NOTE — PHYSICAL EXAM
Please wear wrist splint on right levate with ice 15-20 minutes 3 times per day. May use tylenol xs 2 tabs 3 times daily with naproxen take both with food.   Possible Wrist Fracture  Follow up with your healthcare provider in one week, or as advised. This is to be sure the bone is healing properly.  If X-rays were taken, you will be told of any new findings that may affect your care.  You are very sore over a bone in your wrist called the navicular, or scaphoid, bone. This could be a sign of a hairline fracture, or break, even though no fracture was seen on the X-ray. Therefore, a splint or cast will be applied until repeat X-rays are taken in about 1 to 2 weeks. If you have a hairline fracture, it will show up on the second X-ray and you will have to keep wearing a cast for about 6 to 20 weeks, depending on the location of the fracture. If no fracture is seen on the second X-ray, this means you only have a wrist sprain. The splint or cast can be removed.     Home care    Keep your arm raised to reduce pain and swelling. When sitting or lying down, raise your arm above the level of your heart. You can do this by placing your arm on a pillow that rests on your chest or on a pillow at your side. This is most important during the first 48 hours after injury.    Apply an ice pack over the injured area for no more than 15 to 20 minutes. Do this every 1 to 2 hours for the first 24 to 48 hours. To make an ice pack, put ice cubes in a plastic bag that seals at the top. Wrap the bag in a clean, thin towel or cloth. Never put ice or an ice pack directly on the skin. As the ice melts, be careful that the cast or splint doesn t get wet. You can place the ice pack inside the sling and directly over the splint or cast. Keep using ice packs as needed to ease pain and swelling.    Keep the cast or splint completely dry at all times. Bathe with your cast or splint out of the water. Protect it with 2 large plastic bags. Place 1 bag  around the other. Tape each bag with duct tape at the top end. If a fiberglass cast or splint gets wet, you can dry it with a hair dryer on a cool setting.    You may use over-the-counter pain medicine to control pain, unless another pain medicine was prescribed. If you have chronic liver or kidney disease or ever had a stomach ulcer or GI (gastrointestinal) bleeding, talk with your provider before using these medicines.    If you smoke, try to quit. Tobacco use can interfere with the healing of this fracture. It can also increase the risk of a complication needing surgery.  Follow-up care  Follow up with your healthcare provider in 1 week, or as advised. This is to be sure the bone is healing properly.  If X-rays were taken, you will be told of any new findings that may affect your care.  When to seek medical advice  Call your healthcare provider right away if any of the following occur:    The plaster cast or splint becomes wet or soft    The plaster cast or splint becomes loose    The fiberglass cast or splint remains wet for more than 24 hours    Increased tightness or pain occurs under the cast or splint    Fingers become swollen, cold, blue, numb, or tingly  Date Last Reviewed: 12/3/2015    0813-5005 [employer] [address] [place] [address], [place] [address]. All rights reserved. This information is not intended as a substitute for professional medical care. Always follow your healthcare professional's instructions.      Thank you  Gayle Garner CNP         [General Appearance - Well Developed] : well developed [General Appearance - Well Nourished] : well nourished [Normal Appearance] : normal appearance [Well Groomed] : well groomed [General Appearance - In No Acute Distress] : no acute distress [Edema] : no peripheral edema [Respiration, Rhythm And Depth] : normal respiratory rhythm and effort [Exaggerated Use Of Accessory Muscles For Inspiration] : no accessory muscle use [Abdomen Soft] : soft [Abdomen Tenderness] : non-tender [Costovertebral Angle Tenderness] : no ~M costovertebral angle tenderness [Normal Station and Gait] : the gait and station were normal for the patient's age [] : no rash [No Focal Deficits] : no focal deficits [Oriented To Time, Place, And Person] : oriented to person, place, and time [Affect] : the affect was normal [Mood] : the mood was normal [Not Anxious] : not anxious [No Palpable Adenopathy] : no palpable adenopathy [FreeTextEntry1] : deferred

## 2021-05-06 ENCOUNTER — APPOINTMENT (OUTPATIENT)
Dept: HEART AND VASCULAR | Facility: CLINIC | Age: 76
End: 2021-05-06
Payer: MEDICARE

## 2021-05-06 ENCOUNTER — NON-APPOINTMENT (OUTPATIENT)
Age: 76
End: 2021-05-06

## 2021-05-06 LAB
ALBUMIN SERPL ELPH-MCNC: 4.3 G/DL
ALP BLD-CCNC: 54 U/L
ALT SERPL-CCNC: 43 U/L
ANION GAP SERPL CALC-SCNC: 12 MMOL/L
AST SERPL-CCNC: 30 U/L
BILIRUB SERPL-MCNC: 1.2 MG/DL
BUN SERPL-MCNC: 17 MG/DL
CALCIUM SERPL-MCNC: 9.6 MG/DL
CHLORIDE SERPL-SCNC: 106 MMOL/L
CO2 SERPL-SCNC: 27 MMOL/L
CREAT SERPL-MCNC: 0.98 MG/DL
ESTRADIOL SERPL-MCNC: <5 PG/ML
FSH SERPL-MCNC: 13.4 IU/L
GLUCOSE SERPL-MCNC: 121 MG/DL
LH SERPL-ACNC: 7.1 IU/L
POTASSIUM SERPL-SCNC: 4.4 MMOL/L
PROT SERPL-MCNC: 6.6 G/DL
SODIUM SERPL-SCNC: 145 MMOL/L

## 2021-05-06 PROCEDURE — 93306 TTE W/DOPPLER COMPLETE: CPT

## 2021-05-06 PROCEDURE — 93880 EXTRACRANIAL BILAT STUDY: CPT

## 2021-05-06 PROCEDURE — 93925 LOWER EXTREMITY STUDY: CPT

## 2021-05-11 LAB
TESTOST BND SERPL-MCNC: 1.3 PG/ML
TESTOST SERPL-MCNC: 20.5 NG/DL

## 2021-05-19 ENCOUNTER — NON-APPOINTMENT (OUTPATIENT)
Age: 76
End: 2021-05-19

## 2021-05-24 ENCOUNTER — APPOINTMENT (OUTPATIENT)
Dept: HEART AND VASCULAR | Facility: CLINIC | Age: 76
End: 2021-05-24
Payer: MEDICARE

## 2021-05-24 VITALS
HEART RATE: 70 BPM | DIASTOLIC BLOOD PRESSURE: 78 MMHG | SYSTOLIC BLOOD PRESSURE: 140 MMHG | HEIGHT: 70 IN | WEIGHT: 175 LBS | BODY MASS INDEX: 25.05 KG/M2 | TEMPERATURE: 97.6 F

## 2021-05-24 PROCEDURE — 93280 PM DEVICE PROGR EVAL DUAL: CPT

## 2021-05-24 NOTE — PHYSICAL EXAM
[Normal Appearance] : normal appearance [General Appearance - In No Acute Distress] : no acute distress [Heart Sounds] : normal S1 and S2 [Murmurs] : no murmurs present [Arterial Pulses Normal] : the arterial pulses were normal [Edema] : no peripheral edema present [Respiration, Rhythm And Depth] : normal respiratory rhythm and effort [Auscultation Breath Sounds / Voice Sounds] : lungs were clear to auscultation bilaterally [Left Infraclavicular] : left infraclavicular area [Clean] : clean [Dry] : dry [Healing Well] : healing well [Bowel Sounds] : normal bowel sounds [Abdomen Soft] : soft [Abdomen Tenderness] : non-tender [Abdomen Mass (___ Cm)] : no abdominal mass palpated [Normal Conjunctiva] : the conjunctiva exhibited no abnormalities [Normal Oral Mucosa] : normal oral mucosa [Normal Jugular Venous V Waves Present] : normal jugular venous V waves present [Abnormal Walk] : normal gait [Skin Turgor] : normal skin turgor [] : no rash [Oriented To Time, Place, And Person] : oriented to person, place, and time [Impaired Insight] : insight and judgment were intact [Affect] : the affect was normal [No Anxiety] : not feeling anxious [FreeTextEntry1] : No LE edema

## 2021-05-24 NOTE — REASON FOR VISIT
[Home] : at home, [unfilled] , at the time of the visit. [Medical Office: ([facility])___] : at the medical office located in  [Spouse] : spouse

## 2021-05-24 NOTE — PROCEDURE
[No] : not [Sinus Bradycardia] : sinus bradycardia [Pacemaker] : pacemaker [Biotronik [employer]] : Biotronik [employer] [Threshold Testing Performed] : Threshold testing was performed [Lead Imp:  ___ohms] : lead impedance was [unfilled] ohms [Sensing Amplitude ___mv] : sensing amplitude was [unfilled] mv [___V @] : [unfilled] V [___ ms] : [unfilled] ms [None] : none [de-identified] : ~ 40 bpm [de-identified] : Shemar 8-DRT [de-identified] : [id number] [de-identified] : 8/20/2020 [de-identified] : DDD-CLS [de-identified] :  [de-identified] : 90% remaining battery  [de-identified] : AP 98%\par  0%\par No A or V events

## 2021-05-24 NOTE — HISTORY OF PRESENT ILLNESS
[FreeTextEntry1] : Mr. Castorena is a 77 y/o M with history of bioprosthetic AVR / CABG x 2 in 2015 at [facility] (SVG-RCA and SVG-LCx), aortic atherosclerosis, HTN, right renal mass resection (benign), left adrenal mass, Cushing's disease likely secondary to pituitary tumor s/p surgery [place] [facility] [place], antiphospholipid syndrome, and sinus pauses now s/p PPM-D (Biotronik) 8/20/2020. He offers no acute complaints today.  Reports he walks 1-3 miles 3 days per week.  Limited by knee pain.\par \par The patient had pituitary [place] at [facility] [place] on [place]. During postop course, he was noted to have a 16 beat run of NSVT. Echo showed EF 67%, normally functioning bioprosthetic AVR, mild LVH, and a dilated LA.  He was initiated on Toprol XL 50mg QD.  He followed up with Dr. Martinez and was given an Event Monitor (OncoEthix) to wear from 6/4 to 7/3.  On 06/11/20 had 3.6 second pause during bowel movement noted on MCT monitor.  He was thus referred to us for initial consult for this and subsequently underwent PPM placement.

## 2021-06-08 ENCOUNTER — APPOINTMENT (OUTPATIENT)
Dept: UROLOGY | Facility: CLINIC | Age: 76
End: 2021-06-08
Payer: MEDICARE

## 2021-06-08 VITALS — TEMPERATURE: 98.1 F | HEART RATE: 83 BPM | DIASTOLIC BLOOD PRESSURE: 73 MMHG | SYSTOLIC BLOOD PRESSURE: 108 MMHG

## 2021-06-08 PROCEDURE — 99213 OFFICE O/P EST LOW 20 MIN: CPT

## 2021-06-14 ENCOUNTER — NON-APPOINTMENT (OUTPATIENT)
Age: 76
End: 2021-06-14

## 2021-06-22 ENCOUNTER — APPOINTMENT (OUTPATIENT)
Dept: MRI IMAGING | Facility: HOSPITAL | Age: 76
End: 2021-06-22

## 2021-06-22 ENCOUNTER — OUTPATIENT (OUTPATIENT)
Dept: OUTPATIENT SERVICES | Facility: HOSPITAL | Age: 76
LOS: 1 days | End: 2021-06-22
Payer: MEDICARE

## 2021-06-22 DIAGNOSIS — Z90.49 ACQUIRED ABSENCE OF OTHER SPECIFIED PARTS OF DIGESTIVE TRACT: Chronic | ICD-10-CM

## 2021-06-22 DIAGNOSIS — Z95.2 PRESENCE OF PROSTHETIC HEART VALVE: Chronic | ICD-10-CM

## 2021-06-22 DIAGNOSIS — Z98.890 OTHER SPECIFIED POSTPROCEDURAL STATES: Chronic | ICD-10-CM

## 2021-06-22 PROCEDURE — 74183 MRI ABD W/O CNTR FLWD CNTR: CPT

## 2021-06-22 PROCEDURE — A9585: CPT

## 2021-06-22 PROCEDURE — 74183 MRI ABD W/O CNTR FLWD CNTR: CPT | Mod: 26,MH

## 2021-07-19 ENCOUNTER — NON-APPOINTMENT (OUTPATIENT)
Age: 76
End: 2021-07-19

## 2021-07-20 ENCOUNTER — APPOINTMENT (OUTPATIENT)
Dept: UROLOGY | Facility: CLINIC | Age: 76
End: 2021-07-20

## 2021-07-30 ENCOUNTER — NON-APPOINTMENT (OUTPATIENT)
Age: 76
End: 2021-07-30

## 2021-08-02 ENCOUNTER — RESULT REVIEW (OUTPATIENT)
Age: 76
End: 2021-08-02

## 2021-08-02 ENCOUNTER — APPOINTMENT (OUTPATIENT)
Dept: RADIOLOGY | Facility: CLINIC | Age: 76
End: 2021-08-02
Payer: MEDICARE

## 2021-08-02 ENCOUNTER — APPOINTMENT (OUTPATIENT)
Dept: HEART AND VASCULAR | Facility: CLINIC | Age: 76
End: 2021-08-02
Payer: MEDICARE

## 2021-08-02 ENCOUNTER — OUTPATIENT (OUTPATIENT)
Dept: OUTPATIENT SERVICES | Facility: HOSPITAL | Age: 76
LOS: 1 days | End: 2021-08-02

## 2021-08-02 ENCOUNTER — NON-APPOINTMENT (OUTPATIENT)
Age: 76
End: 2021-08-02

## 2021-08-02 VITALS
SYSTOLIC BLOOD PRESSURE: 152 MMHG | HEIGHT: 70 IN | BODY MASS INDEX: 25.2 KG/M2 | OXYGEN SATURATION: 98 % | WEIGHT: 176 LBS | DIASTOLIC BLOOD PRESSURE: 79 MMHG | HEART RATE: 82 BPM

## 2021-08-02 VITALS — SYSTOLIC BLOOD PRESSURE: 131 MMHG | DIASTOLIC BLOOD PRESSURE: 78 MMHG

## 2021-08-02 VITALS — DIASTOLIC BLOOD PRESSURE: 83 MMHG | SYSTOLIC BLOOD PRESSURE: 142 MMHG

## 2021-08-02 DIAGNOSIS — Z12.5 ENCOUNTER FOR SCREENING FOR MALIGNANT NEOPLASM OF PROSTATE: ICD-10-CM

## 2021-08-02 DIAGNOSIS — Z98.890 OTHER SPECIFIED POSTPROCEDURAL STATES: Chronic | ICD-10-CM

## 2021-08-02 DIAGNOSIS — Z95.2 PRESENCE OF PROSTHETIC HEART VALVE: Chronic | ICD-10-CM

## 2021-08-02 DIAGNOSIS — Z90.49 ACQUIRED ABSENCE OF OTHER SPECIFIED PARTS OF DIGESTIVE TRACT: Chronic | ICD-10-CM

## 2021-08-02 PROCEDURE — 99215 OFFICE O/P EST HI 40 MIN: CPT | Mod: 25

## 2021-08-02 PROCEDURE — 71045 X-RAY EXAM CHEST 1 VIEW: CPT | Mod: 26

## 2021-08-02 PROCEDURE — 93000 ELECTROCARDIOGRAM COMPLETE: CPT

## 2021-08-02 NOTE — REASON FOR VISIT
[FreeTextEntry1] : Diagnostic Tests:\par ----------------------------------\par ECG: \par 08/02/21: A-paced, V-sensed, incomplete RBBB. \par 04/14/21: A-paced, V-sensed, RSR' V1 without ST segment elevation. \par 08/03/20: sinus rhythm, incomplete RBBB. \par 07/13/20: sinus rhythm, incomplete RBBB. \par 06/16/20: sinus bradycardia at 42 bpm, incomplete RBBB. \par 05/28/20: sinus bradycardia at 53 bpm, incomplete RBBB. \par 03/03/20: marked sinus bradycardia at 49 bpm, incomplete RBBB. \par ----------------------------------\par MR:\par 08/12/19: abdomen: left adrenal nodule 2.7cm, 2cm right renal cell carcinoma, hepatic steatosis. \par ----------------------------------\par CT:\par 06/16/20: CTCA: SVG-distal RCA patent, SVG-OM occluded, LM normal, LAD prox severe, mid moderate, D1 moderate, D2 mild, LCx mild, OM1 severe, LPL normal, RCA prox severe, mid occluded, distal with patent graft.\par 03/10/20: CT abdomen/pelvis: s/p excision right renal mass, cyst lateral to aortic bifurcation, aorta-iliac atherosclerotic calcification. \par 08/09/19: CTA chest/abdomen/pelvis: no aortic dissection, + severe aortic and coronary plaque, aortic valve calcifications, 3.6cm left adrenal mass, 2cm right renal mass. \par ---------------------------------\par Echo:\par 05/06/21: EF 69%, mild LVH, grade I diastolic dysfunction, normally functioning bioprosthetic AVR, PPM. \par 05/22/20: EF 67%, normally functioning bioprosthetic AVR, mild LVH, dilated LA. 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 \par 01/1019: EF 55%, LVH, dilated LA, bioprosthetic AVR normally functioning, dilated ascending aorta. \par ---------------------------------\par Stress:\par 03/09/20: exercise stress echo: EF 63%, mild MR, s/p bioprosthetic AVR, normal wall motion, PA pressures, and ECG post 10.1 METs. \par 07/21/15: ETT: 7 minutes of Wilson, no ischemia. \par ---------------------------------\par Cath:\par 07/02/20: LM mild, LAD prox and mid 80% (FFR +) underwent PCI, LCx mild, OM1 90% underwent PCI, RCA prox 100%, patent SVG-RCA, occluded SVG-OM1.  \par 03/03/15: LM normal, LAD prox 60%, distal 70%, LCX 80%, OM1 80%, RCA prox 60%, AV peak-to-peak 49mmHg, MARYLOU 0.9cm2.\par ---------------------------------\par Monitor:\par 06/04/20 to 06/30/20: Biotel MCT: HR 25/50/120, <1% PVCs, <1% APCS, 18 pauses > 2 seconds, longest > 4 seconds, no AF.\par ---------------------------------\par Lower extremity arteries:\par 05/06/21: sono: right dSFA 20-49%, right pPOP 20-49%, left CFA 20-49%, left XAVIER not occluded. \par 08/18/20: sono: left XAVIER 100% mid, left CFA 20-49% stenosis, right CFA <20% stenosis. \par ----------------------------------\par Carotid arteries:\par 05/06/21: sono: b/l prox ICA 20-49% stenosis.

## 2021-08-02 NOTE — PHYSICAL EXAM
[FreeTextEntry1] : Right thigh healing bruise [S3] : no S3 [S4] : no S4 [Right Carotid Bruit] : no bruit heard over the right carotid [Left Carotid Bruit] : no bruit heard over the left carotid [Right Femoral Bruit] : no bruit heard over the right femoral artery [Left Femoral Bruit] : no bruit heard over the left femoral artery

## 2021-08-02 NOTE — ASSESSMENT
[FreeTextEntry1] : 1. CAD: s/p CABG x 2 at time of AVR 04/25/15, SVG-LCx, SVG-RCA, Russ Lester MD at [facility]: 07/02/20: PCI prox/mid LAD and OM1.  \par      - will pursue aggressive medical management\par      - continue ASA 81mg po qd \par \par 2. Aortic stenosis: s/p bioprosthetic AVR, #25 Medtronic porcine, Russ Lester MD at [facility] 04/25/15, s/p echo 05/22/20: EF 67%, normally functioning bioprosthetic AVR, mild LVH, dilated LA. \par      - discussed with patient SBE prophylaxis per AHA guidelines\par      - will follow with serial echocardiograms\par \par 3. HTN: ? secondary to Cushing's syndrome: BP at ACC/AHA 2017 guideline target\par      - continue amlodipine 10mg po QD\par      - continue losartan//25mg po QD\par \par 4. PAD: s/p 05/06/21: sono: right dSFA 20-49%, right pPOP 20-49%, left CFA 20-49%, left XAVIER not occluded. \par      - will pursue aggressive medical management     \par      - will follow with serial bilateral lower extremity arterial sonography \par \par 5. Dyslipidemia: lipids at goal\par      - continue rosuvastatin 40mg po QD\par      - discussed therapeutic lifestyle changes to promote improved lipid metabolism \par      - check lab work today\par \par 6. 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 Antiphospholipid syndrome: no history of arterial or venous thrombosis:\par      - follow up with hematologist\par \par 8. Sick sinus syndrome: likely chronotropic incompetence\par     - s/p Biotronik dual chamber PPM with Franchesca Murcia MD on 08/20/20\par     - follow up with device clinic\par \par 9. Carotid atherosclerosis:\par      - will pursue aggressive medical management\par      - will follow with serial carotid artery sonograms\par \par 10. Preoperative for robotic assisted laparoscopic cholecystectomy with Caitlin Acosta MD on 08/09/21 (fax [phone] fax) :\par       - he had a normal exercise stress echocardiogram on 03/09/20 and has no active CV complaints\par       - he may, therefore, proceed with surgery without cardiac contraindications\par       - he should hold ASA for 5 days prior and resume 1 day post operatively provided any bleeding has subsided\par \par \par An ECG was obtained to evaluate heart rhythm and screen for any underlying cardiac abnormalities. \par \par

## 2021-08-02 NOTE — HISTORY OF PRESENT ILLNESS
[FreeTextEntry1] : Mr. Castorena presents for follow up and management of CAD s/p CABG x 2 (04/12/15 at time of AVR) s/p PCI prox/mid LAD and OM1 (07/02/20), aortic stenosis s/p bioprosthetic AVR (04/12/15), aortic atherosclerosis, HTN, Cushing's syndrome, PAD, sick sinus syndrome s/p Biotronik PPM (08/20/20), and antiphospholipid syndrome.   He was previously followed by Guillermo Muir MD.  On 04/12/15 he underwent a bioprosthetic AVR (# 25 Medtronic porcine) and CABG x 2 with Russ Lester MD at [facility].  In August, 2019 he had complaints of abdominal pain and underwent a CTA chest/abdomen/pelvis on 08/19/19 which revealed no aortic dissection, + severe aortic and coronary plaque, aortic valve calcifications, a 3.6cm left adrenal mass, and 2cm right renal mass.  He had removal of the right renal mass which was benign.   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  He is following with a neurosurgeon, Carol Flores MD, and a head and neck surgeon, Javier Arguelles MD, at [facility] and had pituitary surgery on 05/16/20.  On 05/26/20 I received a call from a cardiologist [place] [facility] Angelica Katz MD, informing me of a 16 beat run of NSVT during his post operative course.  The run was asymptomatic.  He had an echocardiogram on 05/22/20 which revealed an EF of 67%, normally functioning bioprosthetic AVR, mild LVH, and a dilated LA.  He was initiated on Toprol XL 50mg po QD.  On 06/11/20 had a 3.6 second pause during bowel movement noted on MCT monitor.  We agreed to see a heart rhythm specialist. He was evaluated by Franchesca Murcia MD and was tapered off beta blockers.  On 06/16/20 he underwent a CTCA which revealed SVG-distal RCA patent, SVG-OM occluded, LM normal, LAD prox severe, mid moderate, D1 moderate, D2 mild, LCx mild, OM1 severe, LPL normal, RCA prox severe, mid occluded, and distal with patent graft.  He underwent invasive coronary angiography on 07/02/20 which revealed LM mild, LAD prox and mid 80% (FFR +) underwent PCI, LCx mild, OM1 90% underwent PCI, RCA prox 100%, patent SVG-RCA, occluded SVG-OM1.  The following day he had a groin bleed and a sinus pause overnight but otherwise did well.  On 08/18/20 he underwent bilateral lower extremity arterial sonography which revealed left XAVIER 100% mid, left CFA 20-49% stenosis, and right CFA <20% stenosis.  He underwent implantation of a Bitronik dual chamber pacemaker on 08/20/20.  On 05/06/21 he had an echocardiogram which revealed an EF of 69%, mild LVH, grade I diastolic dysfunction, normally functioning bioprosthetic AVR, and a PPM.  At this time, he has complaints of depression and anxiety.  He is preoperative for cholecystectomy with Caitlin Acosta MD on Monday 08/09/21.  \par

## 2021-08-03 ENCOUNTER — APPOINTMENT (OUTPATIENT)
Dept: UROLOGY | Facility: CLINIC | Age: 76
End: 2021-08-03
Payer: MEDICARE

## 2021-08-03 VITALS — HEART RATE: 76 BPM | TEMPERATURE: 98.3 F | SYSTOLIC BLOOD PRESSURE: 137 MMHG | DIASTOLIC BLOOD PRESSURE: 77 MMHG

## 2021-08-03 LAB
ALBUMIN SERPL ELPH-MCNC: 4.7 G/DL
ALP BLD-CCNC: 68 U/L
ALT SERPL-CCNC: 22 U/L
ANION GAP SERPL CALC-SCNC: 13 MMOL/L
AST SERPL-CCNC: 31 U/L
BASOPHILS # BLD AUTO: 0.03 K/UL
BASOPHILS NFR BLD AUTO: 0.3 %
BILIRUB SERPL-MCNC: 1.9 MG/DL
BUN SERPL-MCNC: 15 MG/DL
CALCIUM SERPL-MCNC: 10.2 MG/DL
CHLORIDE SERPL-SCNC: 99 MMOL/L
CHOLEST SERPL-MCNC: 75 MG/DL
CO2 SERPL-SCNC: 30 MMOL/L
CREAT SERPL-MCNC: 1 MG/DL
EOSINOPHIL # BLD AUTO: 0.04 K/UL
EOSINOPHIL NFR BLD AUTO: 0.4 %
ESTIMATED AVERAGE GLUCOSE: 105 MG/DL
GLUCOSE SERPL-MCNC: 112 MG/DL
HBA1C MFR BLD HPLC: 5.3 %
HCT VFR BLD CALC: 46.4 %
HDLC SERPL-MCNC: 34 MG/DL
HGB BLD-MCNC: 15.5 G/DL
IMM GRANULOCYTES NFR BLD AUTO: 0.2 %
LDLC SERPL CALC-MCNC: 28 MG/DL
LDLC SERPL DIRECT ASSAY-MCNC: 30 MG/DL
LYMPHOCYTES # BLD AUTO: 0.89 K/UL
LYMPHOCYTES NFR BLD AUTO: 9.6 %
MAN DIFF?: NORMAL
MCHC RBC-ENTMCNC: 31.3 PG
MCHC RBC-ENTMCNC: 33.4 GM/DL
MCV RBC AUTO: 93.7 FL
MONOCYTES # BLD AUTO: 1.04 K/UL
MONOCYTES NFR BLD AUTO: 11.2 %
NEUTROPHILS # BLD AUTO: 7.25 K/UL
NEUTROPHILS NFR BLD AUTO: 78.3 %
NONHDLC SERPL-MCNC: 42 MG/DL
PLATELET # BLD AUTO: 206 K/UL
POTASSIUM SERPL-SCNC: 3.7 MMOL/L
PROT SERPL-MCNC: 7.2 G/DL
RBC # BLD: 4.95 M/UL
RBC # FLD: 13.4 %
SODIUM SERPL-SCNC: 142 MMOL/L
TRIGL SERPL-MCNC: 70 MG/DL
TSH SERPL-ACNC: 1.8 UIU/ML
WBC # FLD AUTO: 9.27 K/UL

## 2021-08-03 PROCEDURE — 99213 OFFICE O/P EST LOW 20 MIN: CPT

## 2021-08-03 NOTE — ASSESSMENT
[FreeTextEntry1] : Hypogonadism \par testosterone 490 -  Jul 7, 2021\par Improved libido, \par Still sluggish\par continue T cypionate 1 ml (100mg) every 7 days IM \par patient is extremely anxious takes Valium 5-10 mg per day and smoke marijuana daily\par wife reports increasing aggression\par repeat peak Testosterone tomorrow\par check TSH T4 per request, CBC, CMP\par may need to adjust downward\par \par Follow up in 3 months

## 2021-08-03 NOTE — HISTORY OF PRESENT ILLNESS
[None] : None [FreeTextEntry1] :  76M with  CAD s/p X3 stents, aortic valve replacement (on plavix), HTN, Cushing's disease s/p pituitary surgery, partial right nephrectomy 12/2020. highly anxious, take valium 5 mg daily\par returns for follow up with hypogonadism\par complaints of sluggish, energy, good libido\par Denies urinary problems. \par \par Labs Jul 7, 2021\par testosterone 490\par FSH 7.6\par LH 4.9\par Est 14.93

## 2021-08-03 NOTE — PHYSICAL EXAM

## 2021-08-04 ENCOUNTER — NON-APPOINTMENT (OUTPATIENT)
Age: 76
End: 2021-08-04

## 2021-08-04 LAB
ALBUMIN SERPL ELPH-MCNC: 4.8 G/DL
ALP BLD-CCNC: 73 U/L
ALT SERPL-CCNC: 34 U/L
ANION GAP SERPL CALC-SCNC: 15 MMOL/L
AST SERPL-CCNC: 32 U/L
BASOPHILS # BLD AUTO: 0.04 K/UL
BASOPHILS NFR BLD AUTO: 0.4 %
BILIRUB SERPL-MCNC: 2.2 MG/DL
BUN SERPL-MCNC: 15 MG/DL
CALCIUM SERPL-MCNC: 10 MG/DL
CHLORIDE SERPL-SCNC: 101 MMOL/L
CO2 SERPL-SCNC: 28 MMOL/L
CREAT SERPL-MCNC: 1.13 MG/DL
EOSINOPHIL # BLD AUTO: 0.04 K/UL
EOSINOPHIL NFR BLD AUTO: 0.4 %
GLUCOSE SERPL-MCNC: 125 MG/DL
HCT VFR BLD CALC: 45.4 %
HGB BLD-MCNC: 15.9 G/DL
IMM GRANULOCYTES NFR BLD AUTO: 0.2 %
LYMPHOCYTES # BLD AUTO: 0.8 K/UL
LYMPHOCYTES NFR BLD AUTO: 7.6 %
MAN DIFF?: NORMAL
MCHC RBC-ENTMCNC: 31.4 PG
MCHC RBC-ENTMCNC: 35 GM/DL
MCV RBC AUTO: 89.5 FL
MONOCYTES # BLD AUTO: 1.18 K/UL
MONOCYTES NFR BLD AUTO: 11.3 %
NEUTROPHILS # BLD AUTO: 8.39 K/UL
NEUTROPHILS NFR BLD AUTO: 80.1 %
PLATELET # BLD AUTO: 209 K/UL
POTASSIUM SERPL-SCNC: 3.3 MMOL/L
PROT SERPL-MCNC: 7.4 G/DL
RBC # BLD: 5.07 M/UL
RBC # FLD: 13.4 %
SODIUM SERPL-SCNC: 144 MMOL/L
WBC # FLD AUTO: 10.47 K/UL

## 2021-08-06 ENCOUNTER — NON-APPOINTMENT (OUTPATIENT)
Age: 76
End: 2021-08-06

## 2021-08-06 VITALS
OXYGEN SATURATION: 97 % | RESPIRATION RATE: 16 BRPM | TEMPERATURE: 97 F | SYSTOLIC BLOOD PRESSURE: 132 MMHG | WEIGHT: 167.33 LBS | HEIGHT: 70 IN | HEART RATE: 62 BPM | DIASTOLIC BLOOD PRESSURE: 77 MMHG

## 2021-08-06 LAB
TESTOST BND SERPL-MCNC: 26.9 PG/ML
TESTOSTERONE TOTAL S: >1500 NG/DL

## 2021-08-06 RX ORDER — DIAZEPAM 5 MG
1 TABLET ORAL
Qty: 0 | Refills: 0 | DISCHARGE

## 2021-08-06 RX ORDER — DOCUSATE SODIUM 100 MG
200 CAPSULE ORAL
Qty: 0 | Refills: 0 | DISCHARGE

## 2021-08-06 RX ORDER — LOSARTAN/HYDROCHLOROTHIAZIDE 100MG-25MG
1 TABLET ORAL
Qty: 0 | Refills: 0 | DISCHARGE

## 2021-08-06 NOTE — ASU PATIENT PROFILE, ADULT - PSH
Cardiac pacemaker    H/O cervical spine surgery  hardware  History of appendectomy    History of percutaneous coronary intervention  7/2020  History of pituitary surgery  5/2020  Malignant melanoma of skin  extraction  Renal mass  excision, right  S/P AVR (aortic valve replacement)  2015  S/P CABG x 2  4/2015   Cardiac pacemaker    H/O cervical spine surgery  hardware  History of appendectomy    History of percutaneous coronary intervention  7/2020 stents x2  History of pituitary surgery  5/2020  Malignant melanoma of skin  extraction  Renal mass  excision, right  S/P AVR (aortic valve replacement)  2015  S/P CABG x 2  4/2015

## 2021-08-06 NOTE — ASU PATIENT PROFILE, ADULT - PMH
Antiphospholipid syndrome    Aortic valve disorder  Aortic stenosis  CAD (coronary artery disease)    Calculus of kidney  Kidney stones  Cushing's disease    Essential hypertension    Hyperlipidemia    Malignant melanoma of skin  Melanoma on foot s/p surgical removal  NSVT (nonsustained ventricular tachycardia)    PAD (peripheral artery disease)    Pituitary adenoma    SSS (sick sinus syndrome)     Antiphospholipid syndrome    Aortic valve disorder  Aortic stenosis  CAD (coronary artery disease)    Cushing's disease    Essential hypertension    Hyperlipidemia    Malignant melanoma of skin  Melanoma on foot s/p surgical removal  NSVT (nonsustained ventricular tachycardia)    PAD (peripheral artery disease)    Pituitary adenoma    SSS (sick sinus syndrome)

## 2021-08-08 ENCOUNTER — TRANSCRIPTION ENCOUNTER (OUTPATIENT)
Age: 76
End: 2021-08-08

## 2021-08-09 ENCOUNTER — NON-APPOINTMENT (OUTPATIENT)
Age: 76
End: 2021-08-09

## 2021-08-09 ENCOUNTER — OUTPATIENT (OUTPATIENT)
Dept: OUTPATIENT SERVICES | Facility: HOSPITAL | Age: 76
LOS: 1 days | Discharge: HOME | End: 2021-08-09
Payer: MEDICARE

## 2021-08-09 ENCOUNTER — RX RENEWAL (OUTPATIENT)
Age: 76
End: 2021-08-09

## 2021-08-09 ENCOUNTER — RESULT REVIEW (OUTPATIENT)
Age: 76
End: 2021-08-09

## 2021-08-09 DIAGNOSIS — Z98.61 CORONARY ANGIOPLASTY STATUS: Chronic | ICD-10-CM

## 2021-08-09 DIAGNOSIS — N28.89 OTHER SPECIFIED DISORDERS OF KIDNEY AND URETER: Chronic | ICD-10-CM

## 2021-08-09 DIAGNOSIS — Z95.2 PRESENCE OF PROSTHETIC HEART VALVE: Chronic | ICD-10-CM

## 2021-08-09 DIAGNOSIS — Z95.1 PRESENCE OF AORTOCORONARY BYPASS GRAFT: Chronic | ICD-10-CM

## 2021-08-09 DIAGNOSIS — Z98.890 OTHER SPECIFIED POSTPROCEDURAL STATES: Chronic | ICD-10-CM

## 2021-08-09 DIAGNOSIS — Z90.49 ACQUIRED ABSENCE OF OTHER SPECIFIED PARTS OF DIGESTIVE TRACT: Chronic | ICD-10-CM

## 2021-08-09 DIAGNOSIS — Z95.0 PRESENCE OF CARDIAC PACEMAKER: Chronic | ICD-10-CM

## 2021-08-09 LAB
ALBUMIN SERPL ELPH-MCNC: 4.2 G/DL — SIGNIFICANT CHANGE UP (ref 3.3–5)
ALP SERPL-CCNC: 58 U/L — SIGNIFICANT CHANGE UP (ref 40–120)
ALT FLD-CCNC: 45 U/L — SIGNIFICANT CHANGE UP (ref 10–45)
AMYLASE P1 CFR SERPL: 52 U/L — SIGNIFICANT CHANGE UP (ref 25–125)
ANION GAP SERPL CALC-SCNC: 10 MMOL/L — SIGNIFICANT CHANGE UP (ref 5–17)
ANION GAP SERPL CALC-SCNC: 14 MMOL/L — SIGNIFICANT CHANGE UP (ref 5–17)
APTT BLD: 58.6 SEC — HIGH (ref 27.5–35.5)
AST SERPL-CCNC: 54 U/L — HIGH (ref 10–40)
BILIRUB SERPL-MCNC: 2.2 MG/DL — HIGH (ref 0.2–1.2)
BUN SERPL-MCNC: 12 MG/DL — SIGNIFICANT CHANGE UP (ref 7–23)
BUN SERPL-MCNC: 13 MG/DL — SIGNIFICANT CHANGE UP (ref 7–23)
CALCIUM SERPL-MCNC: 8.8 MG/DL — SIGNIFICANT CHANGE UP (ref 8.4–10.5)
CALCIUM SERPL-MCNC: 9.1 MG/DL — SIGNIFICANT CHANGE UP (ref 8.4–10.5)
CHLORIDE SERPL-SCNC: 101 MMOL/L — SIGNIFICANT CHANGE UP (ref 96–108)
CHLORIDE SERPL-SCNC: 104 MMOL/L — SIGNIFICANT CHANGE UP (ref 96–108)
CO2 SERPL-SCNC: 27 MMOL/L — SIGNIFICANT CHANGE UP (ref 22–31)
CO2 SERPL-SCNC: 28 MMOL/L — SIGNIFICANT CHANGE UP (ref 22–31)
CREAT SERPL-MCNC: 1.02 MG/DL — SIGNIFICANT CHANGE UP (ref 0.5–1.3)
CREAT SERPL-MCNC: 1.13 MG/DL — SIGNIFICANT CHANGE UP (ref 0.5–1.3)
GLUCOSE SERPL-MCNC: 136 MG/DL — HIGH (ref 70–99)
GLUCOSE SERPL-MCNC: 151 MG/DL — HIGH (ref 70–99)
HCT VFR BLD CALC: 42.7 % — SIGNIFICANT CHANGE UP (ref 39–50)
HGB BLD-MCNC: 14.9 G/DL — SIGNIFICANT CHANGE UP (ref 13–17)
INR BLD: 1.3 — HIGH (ref 0.88–1.16)
LIDOCAIN IGE QN: 17 U/L — SIGNIFICANT CHANGE UP (ref 7–60)
MAGNESIUM SERPL-MCNC: 2 MG/DL — SIGNIFICANT CHANGE UP (ref 1.6–2.6)
MCHC RBC-ENTMCNC: 31.2 PG — SIGNIFICANT CHANGE UP (ref 27–34)
MCHC RBC-ENTMCNC: 34.9 GM/DL — SIGNIFICANT CHANGE UP (ref 32–36)
MCV RBC AUTO: 89.5 FL — SIGNIFICANT CHANGE UP (ref 80–100)
NRBC # BLD: 0 /100 WBCS — SIGNIFICANT CHANGE UP (ref 0–0)
PHOSPHATE SERPL-MCNC: 2.5 MG/DL — SIGNIFICANT CHANGE UP (ref 2.5–4.5)
PLATELET # BLD AUTO: 173 K/UL — SIGNIFICANT CHANGE UP (ref 150–400)
POTASSIUM SERPL-MCNC: 2.7 MMOL/L — CRITICAL LOW (ref 3.5–5.3)
POTASSIUM SERPL-MCNC: 3.7 MMOL/L — SIGNIFICANT CHANGE UP (ref 3.5–5.3)
POTASSIUM SERPL-SCNC: 2.7 MMOL/L — CRITICAL LOW (ref 3.5–5.3)
POTASSIUM SERPL-SCNC: 3.7 MMOL/L — SIGNIFICANT CHANGE UP (ref 3.5–5.3)
PROT SERPL-MCNC: 6.7 G/DL — SIGNIFICANT CHANGE UP (ref 6–8.3)
PROTHROM AB SERPL-ACNC: 15.4 SEC — HIGH (ref 10.6–13.6)
RBC # BLD: 4.77 M/UL — SIGNIFICANT CHANGE UP (ref 4.2–5.8)
RBC # FLD: 13.3 % — SIGNIFICANT CHANGE UP (ref 10.3–14.5)
SODIUM SERPL-SCNC: 142 MMOL/L — SIGNIFICANT CHANGE UP (ref 135–145)
SODIUM SERPL-SCNC: 142 MMOL/L — SIGNIFICANT CHANGE UP (ref 135–145)
WBC # BLD: 13.89 K/UL — HIGH (ref 3.8–10.5)
WBC # FLD AUTO: 13.89 K/UL — HIGH (ref 3.8–10.5)

## 2021-08-09 PROCEDURE — 88300 SURGICAL PATH GROSS: CPT | Mod: 26

## 2021-08-09 PROCEDURE — 88304 TISSUE EXAM BY PATHOLOGIST: CPT | Mod: 26

## 2021-08-09 RX ORDER — ACETAMINOPHEN 500 MG
1000 TABLET ORAL ONCE
Refills: 0 | Status: COMPLETED | OUTPATIENT
Start: 2021-08-09 | End: 2021-08-09

## 2021-08-09 RX ORDER — POTASSIUM PHOSPHATE, MONOBASIC POTASSIUM PHOSPHATE, DIBASIC 236; 224 MG/ML; MG/ML
15 INJECTION, SOLUTION INTRAVENOUS ONCE
Refills: 0 | Status: COMPLETED | OUTPATIENT
Start: 2021-08-09 | End: 2021-08-09

## 2021-08-09 RX ORDER — HEPARIN SODIUM 5000 [USP'U]/ML
5000 INJECTION INTRAVENOUS; SUBCUTANEOUS EVERY 8 HOURS
Refills: 0 | Status: DISCONTINUED | OUTPATIENT
Start: 2021-08-09 | End: 2021-08-10

## 2021-08-09 RX ORDER — ASPIRIN/CALCIUM CARB/MAGNESIUM 324 MG
324 TABLET ORAL ONCE
Refills: 0 | Status: DISCONTINUED | OUTPATIENT
Start: 2021-08-09 | End: 2021-08-09

## 2021-08-09 RX ORDER — POTASSIUM CHLORIDE 20 MEQ
40 PACKET (EA) ORAL
Refills: 0 | Status: DISCONTINUED | OUTPATIENT
Start: 2021-08-09 | End: 2021-08-09

## 2021-08-09 RX ORDER — ASPIRIN/CALCIUM CARB/MAGNESIUM 324 MG
81 TABLET ORAL DAILY
Refills: 0 | Status: DISCONTINUED | OUTPATIENT
Start: 2021-08-09 | End: 2021-08-10

## 2021-08-09 RX ORDER — ASPIRIN/CALCIUM CARB/MAGNESIUM 324 MG
81 TABLET ORAL ONCE
Refills: 0 | Status: COMPLETED | OUTPATIENT
Start: 2021-08-09 | End: 2021-08-09

## 2021-08-09 RX ORDER — ASPIRIN/CALCIUM CARB/MAGNESIUM 324 MG
81 TABLET ORAL DAILY
Refills: 0 | Status: DISCONTINUED | OUTPATIENT
Start: 2021-08-09 | End: 2021-08-09

## 2021-08-09 RX ORDER — ONDANSETRON 8 MG/1
4 TABLET, FILM COATED ORAL EVERY 6 HOURS
Refills: 0 | Status: DISCONTINUED | OUTPATIENT
Start: 2021-08-09 | End: 2021-08-10

## 2021-08-09 RX ORDER — BUPIVACAINE 13.3 MG/ML
20 INJECTION, SUSPENSION, LIPOSOMAL INFILTRATION ONCE
Refills: 0 | Status: DISCONTINUED | OUTPATIENT
Start: 2021-08-09 | End: 2021-08-10

## 2021-08-09 RX ORDER — POTASSIUM CHLORIDE 20 MEQ
20 PACKET (EA) ORAL ONCE
Refills: 0 | Status: COMPLETED | OUTPATIENT
Start: 2021-08-09 | End: 2021-08-09

## 2021-08-09 RX ORDER — SODIUM CHLORIDE 9 MG/ML
1000 INJECTION, SOLUTION INTRAVENOUS
Refills: 0 | Status: DISCONTINUED | OUTPATIENT
Start: 2021-08-09 | End: 2021-08-10

## 2021-08-09 RX ADMIN — Medication 81 MILLIGRAM(S): at 16:57

## 2021-08-09 RX ADMIN — Medication 1000 MILLIGRAM(S): at 16:49

## 2021-08-09 RX ADMIN — HEPARIN SODIUM 5000 UNIT(S): 5000 INJECTION INTRAVENOUS; SUBCUTANEOUS at 16:03

## 2021-08-09 RX ADMIN — POTASSIUM PHOSPHATE, MONOBASIC POTASSIUM PHOSPHATE, DIBASIC 62.5 MILLIMOLE(S): 236; 224 INJECTION, SOLUTION INTRAVENOUS at 13:31

## 2021-08-09 RX ADMIN — Medication 81 MILLIGRAM(S): at 07:28

## 2021-08-09 RX ADMIN — Medication 20 MILLIEQUIVALENT(S): at 13:09

## 2021-08-09 RX ADMIN — Medication 40 MILLIEQUIVALENT(S): at 12:03

## 2021-08-09 RX ADMIN — SODIUM CHLORIDE 50 MILLILITER(S): 9 INJECTION, SOLUTION INTRAVENOUS at 10:14

## 2021-08-09 RX ADMIN — HEPARIN SODIUM 5000 UNIT(S): 5000 INJECTION INTRAVENOUS; SUBCUTANEOUS at 23:06

## 2021-08-09 RX ADMIN — Medication 400 MILLIGRAM(S): at 16:03

## 2021-08-09 NOTE — BRIEF OPERATIVE NOTE - OPERATION/FINDINGS
patient draped Patient supine. Right arm tucked. Patient prepped and draped in sterile manner. Whelan port placed. Placed 8 mm trocars for robotic cholecystitis. DaVinci robot paired with trocars. Target to gallbladder. Encountered multiple adhesion. Adhesiolysis. With blunt dissection at the neck of the gallbladder. Spillage of bile was suction and irrigated. Dissected cystic duct and cystic arteries. View of safety reached and clips placed. Cut across. Hemostasis achieved. Irrigated with 2 L of SSN and suction. Removed trocars. Closure of Whelan trocar incision with Maxon suture. Closure of skin with Monocryl 4.0.

## 2021-08-09 NOTE — BRIEF OPERATIVE NOTE - PRIMARY SURGEON
Dr. Acosta Kurt with [facility] has questions regarding Chipyadi Kurtz and whether she will be transferring to [facility] and if so, when she needs to be seen.

## 2021-08-09 NOTE — PACU DISCHARGE NOTE - COMMENTS
Pt met PACU discharge criteria. Report given to floor BROOKE Valencia Received pt post-op Laparoscopic robotic assisted cholecystectomy extensive lysis of adhesions. Pt is A&O X4, fully awake. Denies pain. 4 lap sites. Benign  steristrips in place. No oozing.Pt met PACU discharge criteria. Report given to floor BROOKE Valencia

## 2021-08-10 ENCOUNTER — TRANSCRIPTION ENCOUNTER (OUTPATIENT)
Age: 76
End: 2021-08-10

## 2021-08-10 VITALS
HEART RATE: 67 BPM | RESPIRATION RATE: 19 BRPM | OXYGEN SATURATION: 96 % | SYSTOLIC BLOOD PRESSURE: 145 MMHG | TEMPERATURE: 99 F | DIASTOLIC BLOOD PRESSURE: 80 MMHG

## 2021-08-10 PROBLEM — I73.9 PERIPHERAL VASCULAR DISEASE, UNSPECIFIED: Chronic | Status: ACTIVE | Noted: 2021-08-06

## 2021-08-10 PROBLEM — I47.2 VENTRICULAR TACHYCARDIA: Chronic | Status: ACTIVE | Noted: 2021-08-06

## 2021-08-10 PROBLEM — I49.5 SICK SINUS SYNDROME: Chronic | Status: ACTIVE | Noted: 2021-08-06

## 2021-08-10 PROBLEM — D35.2 BENIGN NEOPLASM OF PITUITARY GLAND: Chronic | Status: ACTIVE | Noted: 2021-08-06

## 2021-08-10 LAB
ALBUMIN SERPL ELPH-MCNC: 3.5 G/DL — SIGNIFICANT CHANGE UP (ref 3.3–5)
ALP SERPL-CCNC: 56 U/L — SIGNIFICANT CHANGE UP (ref 40–120)
ALT FLD-CCNC: 68 U/L — HIGH (ref 10–45)
ANION GAP SERPL CALC-SCNC: 10 MMOL/L — SIGNIFICANT CHANGE UP (ref 5–17)
AST SERPL-CCNC: 69 U/L — HIGH (ref 10–40)
BILIRUB DIRECT SERPL-MCNC: <0.2 MG/DL — SIGNIFICANT CHANGE UP (ref 0–0.2)
BILIRUB INDIRECT FLD-MCNC: SIGNIFICANT CHANGE UP MG/DL (ref 0.2–1)
BILIRUB SERPL-MCNC: 1.8 MG/DL — HIGH (ref 0.2–1.2)
BILIRUB SERPL-MCNC: 1.8 MG/DL — HIGH (ref 0.2–1.2)
BUN SERPL-MCNC: 12 MG/DL — SIGNIFICANT CHANGE UP (ref 7–23)
CALCIUM SERPL-MCNC: 8.8 MG/DL — SIGNIFICANT CHANGE UP (ref 8.4–10.5)
CHLORIDE SERPL-SCNC: 104 MMOL/L — SIGNIFICANT CHANGE UP (ref 96–108)
CO2 SERPL-SCNC: 30 MMOL/L — SIGNIFICANT CHANGE UP (ref 22–31)
CREAT SERPL-MCNC: 0.96 MG/DL — SIGNIFICANT CHANGE UP (ref 0.5–1.3)
GLUCOSE SERPL-MCNC: 91 MG/DL — SIGNIFICANT CHANGE UP (ref 70–99)
HCT VFR BLD CALC: 41.3 % — SIGNIFICANT CHANGE UP (ref 39–50)
HGB BLD-MCNC: 14.2 G/DL — SIGNIFICANT CHANGE UP (ref 13–17)
MAGNESIUM SERPL-MCNC: 2 MG/DL — SIGNIFICANT CHANGE UP (ref 1.6–2.6)
MCHC RBC-ENTMCNC: 31.6 PG — SIGNIFICANT CHANGE UP (ref 27–34)
MCHC RBC-ENTMCNC: 34.4 GM/DL — SIGNIFICANT CHANGE UP (ref 32–36)
MCV RBC AUTO: 92 FL — SIGNIFICANT CHANGE UP (ref 80–100)
NRBC # BLD: 0 /100 WBCS — SIGNIFICANT CHANGE UP (ref 0–0)
PHOSPHATE SERPL-MCNC: 2.9 MG/DL — SIGNIFICANT CHANGE UP (ref 2.5–4.5)
PLATELET # BLD AUTO: 166 K/UL — SIGNIFICANT CHANGE UP (ref 150–400)
POTASSIUM SERPL-MCNC: 3.4 MMOL/L — LOW (ref 3.5–5.3)
POTASSIUM SERPL-SCNC: 3.4 MMOL/L — LOW (ref 3.5–5.3)
PROT SERPL-MCNC: 6 G/DL — SIGNIFICANT CHANGE UP (ref 6–8.3)
RBC # BLD: 4.49 M/UL — SIGNIFICANT CHANGE UP (ref 4.2–5.8)
RBC # FLD: 13.4 % — SIGNIFICANT CHANGE UP (ref 10.3–14.5)
SODIUM SERPL-SCNC: 144 MMOL/L — SIGNIFICANT CHANGE UP (ref 135–145)
WBC # BLD: 13.77 K/UL — HIGH (ref 3.8–10.5)
WBC # FLD AUTO: 13.77 K/UL — HIGH (ref 3.8–10.5)

## 2021-08-10 PROCEDURE — 80048 BASIC METABOLIC PNL TOTAL CA: CPT

## 2021-08-10 PROCEDURE — 36415 COLL VENOUS BLD VENIPUNCTURE: CPT

## 2021-08-10 PROCEDURE — 80053 COMPREHEN METABOLIC PANEL: CPT

## 2021-08-10 PROCEDURE — C1889: CPT

## 2021-08-10 PROCEDURE — 47562 LAPAROSCOPIC CHOLECYSTECTOMY: CPT

## 2021-08-10 PROCEDURE — 83735 ASSAY OF MAGNESIUM: CPT

## 2021-08-10 PROCEDURE — 83690 ASSAY OF LIPASE: CPT

## 2021-08-10 PROCEDURE — 85027 COMPLETE CBC AUTOMATED: CPT

## 2021-08-10 PROCEDURE — 85730 THROMBOPLASTIN TIME PARTIAL: CPT

## 2021-08-10 PROCEDURE — 86901 BLOOD TYPING SEROLOGIC RH(D): CPT

## 2021-08-10 PROCEDURE — S2900: CPT

## 2021-08-10 PROCEDURE — 86850 RBC ANTIBODY SCREEN: CPT

## 2021-08-10 PROCEDURE — 82150 ASSAY OF AMYLASE: CPT

## 2021-08-10 PROCEDURE — 82248 BILIRUBIN DIRECT: CPT

## 2021-08-10 PROCEDURE — 84100 ASSAY OF PHOSPHORUS: CPT

## 2021-08-10 PROCEDURE — 88300 SURGICAL PATH GROSS: CPT

## 2021-08-10 PROCEDURE — 82247 BILIRUBIN TOTAL: CPT

## 2021-08-10 PROCEDURE — 86900 BLOOD TYPING SEROLOGIC ABO: CPT

## 2021-08-10 PROCEDURE — 88304 TISSUE EXAM BY PATHOLOGIST: CPT

## 2021-08-10 PROCEDURE — 85610 PROTHROMBIN TIME: CPT

## 2021-08-10 RX ORDER — CEFOTETAN DISODIUM 1 G
VIAL (EA) INJECTION
Refills: 0 | Status: DISCONTINUED | OUTPATIENT
Start: 2021-08-10 | End: 2021-08-10

## 2021-08-10 RX ORDER — OXYCODONE HYDROCHLORIDE 5 MG/1
1 TABLET ORAL
Qty: 12 | Refills: 0
Start: 2021-08-10 | End: 2021-08-12

## 2021-08-10 RX ORDER — TAMSULOSIN HYDROCHLORIDE 0.4 MG/1
0.8 CAPSULE ORAL DAILY
Refills: 0 | Status: DISCONTINUED | OUTPATIENT
Start: 2021-08-10 | End: 2021-08-10

## 2021-08-10 RX ORDER — CEFOTETAN DISODIUM 1 G
2 VIAL (EA) INJECTION ONCE
Refills: 0 | Status: COMPLETED | OUTPATIENT
Start: 2021-08-10 | End: 2021-08-10

## 2021-08-10 RX ORDER — AMLODIPINE BESYLATE 2.5 MG/1
10 TABLET ORAL DAILY
Refills: 0 | Status: DISCONTINUED | OUTPATIENT
Start: 2021-08-10 | End: 2021-08-10

## 2021-08-10 RX ORDER — CEFPODOXIME PROXETIL 100 MG
1 TABLET ORAL
Qty: 10 | Refills: 0
Start: 2021-08-10 | End: 2021-08-14

## 2021-08-10 RX ORDER — HYDROCHLOROTHIAZIDE 25 MG
25 TABLET ORAL DAILY
Refills: 0 | Status: DISCONTINUED | OUTPATIENT
Start: 2021-08-10 | End: 2021-08-10

## 2021-08-10 RX ORDER — METRONIDAZOLE 500 MG
1 TABLET ORAL
Qty: 15 | Refills: 0
Start: 2021-08-10 | End: 2021-08-14

## 2021-08-10 RX ORDER — PIPERACILLIN AND TAZOBACTAM 4; .5 G/20ML; G/20ML
3.38 INJECTION, POWDER, LYOPHILIZED, FOR SOLUTION INTRAVENOUS ONCE
Refills: 0 | Status: DISCONTINUED | OUTPATIENT
Start: 2021-08-10 | End: 2021-08-10

## 2021-08-10 RX ORDER — PIPERACILLIN AND TAZOBACTAM 4; .5 G/20ML; G/20ML
3.38 INJECTION, POWDER, LYOPHILIZED, FOR SOLUTION INTRAVENOUS EVERY 6 HOURS
Refills: 0 | Status: DISCONTINUED | OUTPATIENT
Start: 2021-08-10 | End: 2021-08-10

## 2021-08-10 RX ORDER — ROSUVASTATIN CALCIUM 5 MG/1
40 TABLET ORAL AT BEDTIME
Refills: 0 | Status: DISCONTINUED | OUTPATIENT
Start: 2021-08-10 | End: 2021-08-10

## 2021-08-10 RX ORDER — TAMSULOSIN HYDROCHLORIDE 0.4 MG/1
1 CAPSULE ORAL
Qty: 10 | Refills: 0
Start: 2021-08-10 | End: 2021-08-19

## 2021-08-10 RX ORDER — ACETAMINOPHEN 500 MG
650 TABLET ORAL EVERY 6 HOURS
Refills: 0 | Status: DISCONTINUED | OUTPATIENT
Start: 2021-08-10 | End: 2021-08-10

## 2021-08-10 RX ORDER — DOCUSATE SODIUM 100 MG
1 CAPSULE ORAL
Qty: 6 | Refills: 0
Start: 2021-08-10 | End: 2021-08-12

## 2021-08-10 RX ORDER — ACETAMINOPHEN 500 MG
650 TABLET ORAL ONCE
Refills: 0 | Status: DISCONTINUED | OUTPATIENT
Start: 2021-08-10 | End: 2021-08-10

## 2021-08-10 RX ORDER — LOSARTAN POTASSIUM 100 MG/1
100 TABLET, FILM COATED ORAL DAILY
Refills: 0 | Status: DISCONTINUED | OUTPATIENT
Start: 2021-08-10 | End: 2021-08-10

## 2021-08-10 RX ORDER — LOSARTAN/HYDROCHLOROTHIAZIDE 100MG-25MG
1 TABLET ORAL DAILY
Refills: 0 | Status: DISCONTINUED | OUTPATIENT
Start: 2021-08-10 | End: 2021-08-10

## 2021-08-10 RX ORDER — CEFOTETAN DISODIUM 1 G
2 VIAL (EA) INJECTION EVERY 12 HOURS
Refills: 0 | Status: DISCONTINUED | OUTPATIENT
Start: 2021-08-10 | End: 2021-08-10

## 2021-08-10 RX ORDER — POTASSIUM CHLORIDE 20 MEQ
40 PACKET (EA) ORAL ONCE
Refills: 0 | Status: COMPLETED | OUTPATIENT
Start: 2021-08-10 | End: 2021-08-10

## 2021-08-10 RX ADMIN — TAMSULOSIN HYDROCHLORIDE 0.8 MILLIGRAM(S): 0.4 CAPSULE ORAL at 07:05

## 2021-08-10 RX ADMIN — Medication 1 TABLET(S): at 14:02

## 2021-08-10 RX ADMIN — Medication 650 MILLIGRAM(S): at 14:02

## 2021-08-10 RX ADMIN — Medication 40 MILLIEQUIVALENT(S): at 08:01

## 2021-08-10 RX ADMIN — HEPARIN SODIUM 5000 UNIT(S): 5000 INJECTION INTRAVENOUS; SUBCUTANEOUS at 13:22

## 2021-08-10 RX ADMIN — HEPARIN SODIUM 5000 UNIT(S): 5000 INJECTION INTRAVENOUS; SUBCUTANEOUS at 06:31

## 2021-08-10 RX ADMIN — Medication 81 MILLIGRAM(S): at 13:22

## 2021-08-10 RX ADMIN — Medication 100 GRAM(S): at 13:23

## 2021-08-10 NOTE — DISCHARGE NOTE PROVIDER - NSDCCPCAREPLAN_GEN_ALL_CORE_FT
PRINCIPAL DISCHARGE DIAGNOSIS  Diagnosis: Cholelithiasis without cholecystitis  Assessment and Plan of Treatment:

## 2021-08-10 NOTE — DISCHARGE NOTE NURSING/CASE MANAGEMENT/SOCIAL WORK - PATIENT PORTAL LINK FT
You can access the FollowMyHealth Patient Portal offered by [facility] by registering at the following website: [email or web address]. By joining [employer]’s FollowMyHealth portal, you will also be able to view your health information using other applications (apps) compatible with our system.

## 2021-08-10 NOTE — DISCHARGE NOTE PROVIDER - NSDCACTIVITY_GEN_ALL_CORE
Do not drive or operate machinery/Showering allowed/Walking - Indoors allowed/No heavy lifting/straining/Walking - Outdoors allowed Do not drive or operate machinery/Showering allowed/Stairs allowed/Walking - Indoors allowed/No heavy lifting/straining/Walking - Outdoors allowed

## 2021-08-10 NOTE — DISCHARGE NOTE PROVIDER - NSDCCPTREATMENT_GEN_ALL_CORE_FT
PRINCIPAL PROCEDURE  Procedure: Cholecystectomy, laparoscopic, robot-assisted  Findings and Treatment:

## 2021-08-10 NOTE — DISCHARGE NOTE PROVIDER - CARE PROVIDER_API CALL
Caitlin Acosta  SURGERY  [address], [address]  [place], [place] [address]  Phone: [phone]  Fax: [phone]  Follow Up Time:

## 2021-08-10 NOTE — DISCHARGE NOTE PROVIDER - NSDCFUSCHEDAPPT_GEN_ALL_CORE_FT
KRISTIN ACEVES ; 10/27/2021 ; [facility] [address] KRISTIN ACEVES ; 08/13/2021 ; [facility] [address] [facility] 08/[facility] ; [facility]  [address] [facility] 10/27/2021 ; [facility] [address]

## 2021-08-10 NOTE — DISCHARGE NOTE PROVIDER - NSDCFUADDAPPT_GEN_ALL_CORE_FT
Please follow up with Dr. Acosta in one week; you may call the office to make an appointment at your earliest convenience.     Please follow up with the Urology clinic for norton removal. You may call the office to make an appointment at your earliest convenience. Please follow up with Dr. Acosta in one week; you may call the office to make an appointment at your earliest convenience.     Please follow up with the Urology clinic in 2-3 days for urinary (Moody) catheter removal. You may call the Urology Clinic at the number provided to make an appointment at your earliest convenience.

## 2021-08-10 NOTE — DISCHARGE NOTE PROVIDER - NSFOLLOWUPCLINICS_GEN_ALL_ED_FT
[facility]  Urology  [address]  [place], [place] [address]  Phone: [phone]  Fax:

## 2021-08-10 NOTE — DISCHARGE NOTE PROVIDER - NSDCFUADDINST_GEN_ALL_CORE_FT
Please follow up with Dr. Acosta next week. Call his office to schedule an appointment: [phone]. Bed rest for 4 days. Use ice packs to the affected area at all times. Drink at least 2L of water in the first 24 hours. Remain on liquid diet until bowel movement, then you may have regular diet. Take 2 extra strength Tylenol + 1 Advil = 3 tablets at the same time every 6 hours. Take Vicoden at bedtime. Call the office if you experience increasing pain, nausea, vomiting, swelling, redness, or drainage from incision site, temperature >101.4F  General Discharge Instructions:  Please resume all regular home medications unless specifically advised not to take a particular medication. Also, please take any new medications as prescribed.  Please get plenty of rest, continue to ambulate several times per day, and drink adequate amounts of fluids. Avoid lifting weights greater than 5-10 lbs until you follow-up with your surgeon, who will instruct you further regarding activity restrictions.  Avoid driving or operating heavy machinery while taking pain medications.  Please follow-up with your surgeon and Primary Care Provider (PCP) as advised.  Incision Care:  *Please call your doctor or nurse practitioner if you have increased pain, swelling, redness, or drainage from the incision site.  *Avoid swimming and baths until your follow-up appointment.  *You may shower, and wash surgical incisions with a mild soap and warm water. Gently pat the area dry.  *If you have staples, they will be removed at your follow-up appointment.  *If you have steri-strips, they will fall off on their own. Please remove any remaining strips 7-10 days after surgery.    Warning Signs:  Please call your doctor or nurse practitioner if you experience the following:  *You experience new chest pain, pressure, squeezing or tightness.  *New or worsening cough, shortness of breath, or wheeze.  *If you are vomiting and cannot keep down fluids or your medications.  *You are getting dehydrated due to continued vomiting, diarrhea, or other reasons. Signs of dehydration include dry mouth, rapid heartbeat, or feeling dizzy or faint when standing.  *You see blood or dark/black material when you vomit or have a bowel movement.  *You experience burning when you urinate, have blood in your urine, or experience a discharge.  *Your pain is not improving within 8-12 hours or is not gone within 24 hours. Call or return immediately if your pain is getting worse, changes location, or moves to your chest or back.  *You have shaking chills, or fever greater than 101.5 degrees Fahrenheit or 38 degrees Celsius.  *Any change in your symptoms, or any new symptoms that concern you.   Please follow up with Dr. Acosta next week. Call his office to schedule an appointment: [phone]. Use ice packs to the affected area at all times. Drink at least 2L of water in the first 24 hours. Remain on full liquid diet until you have a bowel movement, then you may have regular diet. Take 2 extra strength Tylenol + 1 Advil = 3 tablets at the same time every 6 hours. You have been prescribed 2 oral antibiotics. Please be sure to complete the entire course as directed. Call the office if you experience increasing pain, nausea, vomiting, swelling, redness, or drainage from incision site, temperature >101.4F    General Discharge Instructions:  Please resume all regular home medications unless specifically advised not to take a particular medication. Also, please take any new medications as prescribed.  Please get plenty of rest, continue to ambulate several times per day, and drink adequate amounts of fluids. Avoid lifting weights greater than 5-10 lbs until you follow-up with your surgeon, who will instruct you further regarding activity restrictions.  Please follow-up with your surgeon and Primary Care Provider (PCP) as advised.  Incision Care:  *Please call your doctor or nurse practitioner if you have increased pain, swelling, redness, or drainage from the incision site.  *Avoid swimming and baths until your follow-up appointment.  *You may shower, and wash surgical incisions with a mild soap and warm water. Gently pat the area dry.  *If you have staples, they will be removed at your follow-up appointment.  *If you have steri-strips, they will fall off on their own. Please remove any remaining strips 7-10 days after surgery.    Warning Signs:  Please call your doctor or nurse practitioner if you experience the following:  *You experience new chest pain, pressure, squeezing or tightness.  *New or worsening cough, shortness of breath, or wheeze.  *If you are vomiting and cannot keep down fluids or your medications.  *You are getting dehydrated due to continued vomiting, diarrhea, or other reasons. Signs of dehydration include dry mouth, rapid heartbeat, or feeling dizzy or faint when standing.  *You see blood or dark/black material when you vomit or have a bowel movement.  *You experience burning when you urinate, have blood in your urine, or experience a discharge.  *Your pain is not improving within 8-12 hours or is not gone within 24 hours. Call or return immediately if your pain is getting worse, changes location, or moves to your chest or back.  *You have shaking chills, or fever greater than 101.5 degrees Fahrenheit or 38 degrees Celsius.  *Any change in your symptoms, or any new symptoms that concern you.   Please follow up with Dr. Acosta next week. Call his office to schedule an appointment: [phone]. Use ice packs to the affected area at all times. Drink at least 2L of water in the first 24 hours. Remain on full liquid diet until you have a bowel movement, then you may have regular diet. Take 2 extra strength Tylenol + 1 Advil = 3 tablets at the same time every 6 hours. You have been prescribed 2 oral antibiotics. Please be sure to complete the entire course as directed. Call the office if you experience increasing pain, nausea, vomiting, swelling, redness, or drainage from incision site, temperature >101.4F    General Discharge Instructions:  Please resume all regular home medications unless specifically advised not to take a particular medication. Also, please take any new medications as prescribed.  Please get plenty of rest, continue to ambulate several times per day, and drink adequate amounts of fluids. Avoid lifting weights greater than 5-10 lbs until you follow-up with your surgeon, who will instruct you further regarding activity restrictions.  Please follow-up with your surgeon and Primary Care Provider (PCP) as advised.    Incision Care:  *Please call your doctor or nurse practitioner if you have increased pain, swelling, redness, or drainage from the incision site.  *Avoid swimming and baths until your follow-up appointment.  *You may shower, and wash surgical incisions with a mild soap and warm water. Gently pat the area dry.  *If you have staples, they will be removed at your follow-up appointment.  *If you have steri-strips, they will fall off on their own. Please remove any remaining strips 7-10 days after surgery.    Warning Signs:  Please call your doctor or nurse practitioner if you experience the following:  *You experience new chest pain, pressure, squeezing or tightness.  *New or worsening cough, shortness of breath, or wheeze.  *If you are vomiting and cannot keep down fluids or your medications.  *You are getting dehydrated due to continued vomiting, diarrhea, or other reasons. Signs of dehydration include dry mouth, rapid heartbeat, or feeling dizzy or faint when standing.  *You see blood or dark/black material when you vomit or have a bowel movement.  *You experience burning when you urinate, have blood in your urine, or experience a discharge.  *Your pain is not improving within 8-12 hours or is not gone within 24 hours. Call or return immediately if your pain is getting worse, changes location, or moves to your chest or back.  *You have shaking chills, or fever greater than 101.5 degrees Fahrenheit or 38 degrees Celsius.  *Any change in your symptoms, or any new symptoms that concern you. Please follow up with Dr. Acosta next week. Call his office to schedule an appointment: [phone]. Use ice packs to the affected area at all times. Drink at least 2L of water in the first 24 hours. Remain on full liquid diet until you have a bowel movement, then you may have regular diet. Take 2 extra strength Tylenol + 1 Advil = 3 tablets at the same time every 6 hours. For breakthrough pain you may take Oxycodone as prescribed. Please use Colace while using narcotic pain medications to avoid constipation. You have been prescribed 2 oral antibiotics. Please be sure to complete the entire course as directed. Call the office if you experience increasing pain, nausea, vomiting, swelling, redness, or drainage from incision site, temperature >101.4F    General Discharge Instructions:  Please resume all regular home medications unless specifically advised not to take a particular medication. Also, please take any new medications as prescribed.  Please get plenty of rest, continue to ambulate several times per day, and drink adequate amounts of fluids. Avoid lifting weights greater than 5-10 lbs until you follow-up with your surgeon, who will instruct you further regarding activity restrictions.  Please follow-up with your surgeon and Primary Care Provider (PCP) as advised.    Incision Care:  *Please call your doctor or nurse practitioner if you have increased pain, swelling, redness, or drainage from the incision site.  *Avoid swimming and baths until your follow-up appointment.  *You may shower, and wash surgical incisions with a mild soap and warm water. Gently pat the area dry.  *If you have staples, they will be removed at your follow-up appointment.  *If you have steri-strips, they will fall off on their own. Please remove any remaining strips 7-10 days after surgery.    Warning Signs:  Please call your doctor or nurse practitioner if you experience the following:  *You experience new chest pain, pressure, squeezing or tightness.  *New or worsening cough, shortness of breath, or wheeze.  *If you are vomiting and cannot keep down fluids or your medications.  *You are getting dehydrated due to continued vomiting, diarrhea, or other reasons. Signs of dehydration include dry mouth, rapid heartbeat, or feeling dizzy or faint when standing.  *You see blood or dark/black material when you vomit or have a bowel movement.  *You experience burning when you urinate, have blood in your urine, or experience a discharge.  *Your pain is not improving within 8-12 hours or is not gone within 24 hours. Call or return immediately if your pain is getting worse, changes location, or moves to your chest or back.  *You have shaking chills, or fever greater than 101.5 degrees Fahrenheit or 38 degrees Celsius.  *Any change in your symptoms, or any new symptoms that concern you.

## 2021-08-10 NOTE — PROGRESS NOTE ADULT - ASSESSMENT
76M hx of CAD s/p CABGx2 + bioprostetic AVR (#25 [employer] Porcine) (2015), s/p PCI prox/mid LAD and OM1 (07/02/20), aortic stenosis s/p bioprosthetic AVR (04/12/15), aortic atherosclerosis, HTN, Cushing's syndrome, PAD, sick sinus syndrome s/p Biotronik PPM (08/20/20), and antiphospholipid syndrome. Right partial nephrectomy for right benign renal mass (2019). Pituitaty surgery for Cushing disease. 05/06/21 he had an echocardiogram which revealed an EF of 69%, mild LVH, grade I diastolic dysfunction, normally functioning bioprosthetic AVR, and a PPM now s/p robotic cholecystectomy w/ t bili 2.2 post op    CLD/IVF  Pain/nausea control   SCD/SQH, home ASA  OOBA/IS  AM labs f/u CMP  23 hour obs  
76M hx of CAD s/p CABGx2 + bioprostetic AVR (#25 [employer]onic Porcine) (2015), s/p PCI prox/mid LAD and OM1 (07/02/20), aortic stenosis s/p bioprosthetic AVR (04/12/15), aortic atherosclerosis, HTN, Cushing's syndrome, PAD, sick sinus syndrome s/p Biotronik PPM (08/20/20), and antiphospholipid syndrome. Right partial nephrectomy for right benign renal mass (2019). Pituitaty surgery for Cushing disease. 05/06/21 he had an echocardiogram which revealed an EF of 69%, mild LVH, grade I diastolic dysfunction, normally functioning bioprosthetic AVR, and a PPM now s/p robotic cholecystectomy    CLD/IVF  Pain/nausea control   SCD/SQH, home ASA  OOBA/IS  AM labs  23 hour obs

## 2021-08-10 NOTE — PROGRESS NOTE ADULT - SUBJECTIVE AND OBJECTIVE BOX
24 hr events: : pt unable to void since surgery, b/s with 482cc retained, norton placed, radha CLD, -f/-bm    SUBJECTIVE: Patient doing well, seen at bedside with no acute complaints     Vital Signs Last 24 Hrs  T(C): 36.3 (10 Aug 2021 05:18), Max: 36.8 (09 Aug 2021 10:02)  T(F): 97.3 (10 Aug 2021 05:18), Max: 98.3 (09 Aug 2021 10:02)  HR: 68 (10 Aug 2021 06:36) (60 - 80)  BP: 150/74 (10 Aug 2021 06:36) (117/66 - 164/78)  BP(mean): 105 (10 Aug 2021 06:36) (86 - 112)  RR: 18 (09 Aug 2021 21:05) (15 - 19)  SpO2: 97% (10 Aug 2021 06:36) (96% - 100%)    Physical Exam:  General: NAD  Pulmonary: Nonlabored breathing, no respiratory distress  Cardiovascular: NSR  Abdominal: soft, NT/ND, incisions clean dry and intact.  Extremities: WWP, SCDs in place    Lines/drains/tubes:    I&O's Summary    09 Aug 2021 07:01  -  10 Aug 2021 07:00  --------------------------------------------------------  IN: 1300 mL / OUT: 800 mL / NET: 500 mL        LABS:                        14.2   13.77 )-----------( 166      ( 10 Aug 2021 06:36 )             41.3     08-10    144  |  104  |  12  ----------------------------<  91  3.4<L>   |  30  |  0.96    Ca    8.8      10 Aug 2021 06:36  Phos  2.9     08-10  Mg     2.0     08-10    TPro  6.0  /  Alb  3.5  /  TBili  1.8<H>  /  DBili  x   /  AST  69<H>  /  ALT  68<H>  /  AlkPhos  56  08-10    PT/INR - ( 09 Aug 2021 07:21 )   PT: 15.4 sec;   INR: 1.30          PTT - ( 09 Aug 2021 07:21 )  PTT:58.6 sec    CAPILLARY BLOOD GLUCOSE        LIVER FUNCTIONS - ( 10 Aug 2021 06:36 )  Alb: 3.5 g/dL / Pro: 6.0 g/dL / ALK PHOS: 56 U/L / ALT: 68 U/L / AST: 69 U/L / GGT: x             RADIOLOGY & ADDITIONAL STUDIES:    
POST-OPERATIVE NOTE    Procedure: robotic cholecystectomy    Diagnosis/Indication: cholelithiasis    Surgeon: Karla    S: Pt has no complaints. Denies CP, SOB, calf tenderness. Pain controlled with medication. Denies nausea, vomiting.    O:  T(C): --  T(F): --  HR: 74 (08-09-21 @ 15:00) (67 - 80)  BP: 117/66 (08-09-21 @ 15:00) (117/66 - 130/73)  RR: 19 (08-09-21 @ 15:00) (15 - 19)  SpO2: 98% (08-09-21 @ 15:00) (96% - 100%)  Wt(kg): --                        14.9   13.89 )-----------( 173      ( 09 Aug 2021 10:24 )             42.7     08-09    142  |  101  |  13  ----------------------------<  136<H>  2.7<LL>   |  27  |  1.13    Ca    9.1      09 Aug 2021 10:24  Phos  2.5     08-09  Mg     2.0     08-09    TPro  6.7  /  Alb  4.2  /  TBili  2.2<H>  /  DBili  x   /  AST  54<H>  /  ALT  45  /  AlkPhos  58  08-09      Gen: NAD, resting comfortably in bed  C/V: NSR  Pulm: Nonlabored breathing, no respiratory distress  Abd: soft, NT/ND, incisions CDI  Extrem: o calf  tenderness, SCDs in place

## 2021-08-10 NOTE — PROGRESS NOTE ADULT - ATTENDING COMMENTS
POD # 1 S/P Robotic cholecystectomy, ROVERTO, Abdomen soft, BS+, Flatus+, BM-, wounds dry, clean, intact, tolerating diet, D/C home with F/U in the office.

## 2021-08-10 NOTE — DISCHARGE NOTE NURSING/CASE MANAGEMENT/SOCIAL WORK - NSDCFUADDAPPT_GEN_ALL_CORE_FT
Please follow up with Dr. Acosta in one week; you may call the office to make an appointment at your earliest convenience.     Please follow up with the Urology clinic in 2-3 days for urinary (Moody) catheter removal. You may call the Urology Clinic at the number provided to make an appointment at your earliest convenience.

## 2021-08-10 NOTE — DISCHARGE NOTE PROVIDER - NSDCMRMEDTOKEN_GEN_ALL_CORE_FT
amLODIPine 10 mg oral tablet: 1 tab(s) orally once a day   aspirin 81 mg oral tablet: 1 tab(s) orally once a day  Colace: 200  orally once a day (at bedtime)  Crestor 40 mg oral tablet: 1 tab(s) orally once a day  losartan-hydrochlorothiazide 100 mg-25 mg oral tablet: 1 tab(s) orally once a day   amLODIPine 10 mg oral tablet: 1 tab(s) orally once a day   aspirin 81 mg oral tablet: 1 tab(s) orally once a day  cefpodoxime 200 mg oral tablet: 1 tab(s) orally every 12 hours   Colace: 200  orally once a day (at bedtime)  Crestor 40 mg oral tablet: 1 tab(s) orally once a day  Flagyl 500 mg oral tablet: 1 tab(s) orally 3 times a day   losartan-hydrochlorothiazide 100 mg-25 mg oral tablet: 1 tab(s) orally once a day   amLODIPine 10 mg oral tablet: 1 tab(s) orally once a day   aspirin 81 mg oral tablet: 1 tab(s) orally once a day  cefpodoxime 200 mg oral tablet: 1 tab(s) orally every 12 hours   Colace: 200  orally once a day (at bedtime)  Crestor 40 mg oral tablet: 1 tab(s) orally once a day  Flagyl 500 mg oral tablet: 1 tab(s) orally 3 times a day   losartan-hydrochlorothiazide 100 mg-25 mg oral tablet: 1 tab(s) orally once a day  tamsulosin 0.4 mg oral capsule: 1 cap(s) orally once a day    amLODIPine 10 mg oral tablet: 1 tab(s) orally once a day   aspirin 81 mg oral tablet: 1 tab(s) orally once a day  cefpodoxime 200 mg oral tablet: 1 tab(s) orally every 12 hours   Colace: 200  orally once a day (at bedtime)  Colace 100 mg oral capsule: 1 cap(s) orally 2 times a day, As Needed -for constipation   Crestor 40 mg oral tablet: 1 tab(s) orally once a day  Flagyl 500 mg oral tablet: 1 tab(s) orally 3 times a day   losartan-hydrochlorothiazide 100 mg-25 mg oral tablet: 1 tab(s) orally once a day  oxyCODONE 5 mg oral tablet: 1 tab(s) orally every 6 hours, As Needed -for severe pain MDD:4   tamsulosin 0.4 mg oral capsule: 1 cap(s) orally once a day

## 2021-08-10 NOTE — DISCHARGE NOTE PROVIDER - HOSPITAL COURSE
77yo male with PMH of CAD [(s/p CABGx2 + bioprostetic AVR in 2015), s/p PCI prox/mid LAD and OM1 (2020)], PAD, HTN, Cushing's syndrome (pituitary surgery in May 2021), sick sinus syndrome (s/p Biotronik PPM in 2020), antiphospholipid syndrome, right partial nephrectomy for right benign renal mass (2019) presneted on 8/9/2021 for robotic-assisted laparoscopic cholecystectomy for cholelithiasis without cholecystitis. Patient recovered post-op expectedly with pain mainly controlled. However, Moody catheter was inserted post-op plus starting Flomax 0.8mg/day, due to urinary retention (480cc on bladder scan). Total bilirubin was elevated (2.2) on post-op day 0, which required continued observation until assuring normalization.   75yo male with PMH of CAD [(s/p CABGx2 + bioprostetic AVR in 2015), s/p PCI prox/mid LAD and OM1 (2020)], PAD, HTN, Cushing's syndrome (pituitary surgery in May 2021), sick sinus syndrome (s/p Biotronik PPM in 2020), antiphospholipid syndrome, right partial nephrectomy for right benign renal mass (2019) presneted on 8/9/2021 for robotic-assisted laparoscopic cholecystectomy for cholelithiasis without cholecystitis. Patient recovered post-op expectedly with pain mainly controlled. However, Moody catheter was inserted post-op plus starting Flomax 0.8mg/day, due to urinary retention (480cc on bladder scan). Total bilirubin was elevated (2.2) on post-op day 0, which required continued observation until assuring normalization, POD1 t bili downtrended to 1.8 (which is baseline for this patient). Patient's post-operative course was uncomplicated. Diet was advanced as tolerated and pain was well controlled on medication. On day of discharge, pt deemed stable and ready to return home with plan to follow up as an outpatient.

## 2021-08-12 ENCOUNTER — APPOINTMENT (OUTPATIENT)
Dept: UROLOGY | Facility: CLINIC | Age: 76
End: 2021-08-12

## 2021-08-13 ENCOUNTER — APPOINTMENT (OUTPATIENT)
Dept: UROLOGY | Facility: CLINIC | Age: 76
End: 2021-08-13

## 2021-08-13 ENCOUNTER — APPOINTMENT (OUTPATIENT)
Dept: UROLOGY | Facility: CLINIC | Age: 76
End: 2021-08-13
Payer: MEDICARE

## 2021-08-13 VITALS — TEMPERATURE: 98.2 F | DIASTOLIC BLOOD PRESSURE: 77 MMHG | SYSTOLIC BLOOD PRESSURE: 121 MMHG | HEART RATE: 89 BPM

## 2021-08-13 PROCEDURE — 51798 US URINE CAPACITY MEASURE: CPT

## 2021-08-13 PROCEDURE — 99213 OFFICE O/P EST LOW 20 MIN: CPT

## 2021-08-13 NOTE — ASSESSMENT
[FreeTextEntry1] : 76 male s/p Robotic-assisted laparoscopic cholecystectomy 8/9/2021\par Post op urinary retention s/p norton catheter insertion\par Instilled 300 ml  sterile water\par  ml\par PVR 22 ml \par feels good, denies pain. \par Instructed patient to go to ER and call office if unable  to urinate, sever pain in bladder, fever and chills. \par continue tamsulosin\par Follow up on Monday 8/16/21 for PVR \par \par

## 2021-08-13 NOTE — HISTORY OF PRESENT ILLNESS
[Currently Experiencing ___] :  [unfilled] [Urinary Retention] : urinary retention [None] : None [FreeTextEntry1] : 77yo male with PMH of CAD [(s/p CABGx2 + bioprostetic AVR in 2015), s/p PCI prox/mid LAD and OM1 (2020)], PAD, HTN, Cushing's syndrome (pituitary surgery in May 2021), sick sinus syndrome (s/p Biotronik PPM in 2020), antiphospholipid syndrome, right partial nephrectomy for right benign renal mass (2019)\par Robotic-assisted laparoscopic cholecystectomy 8/9/2021 for cholelithiasis without cholecystitis. Patient recovered post-op expectedly with pain mainly controlled.\par Norton catheter was inserted  for urinary retention (480 cc) August 9, 2021 \par Currently Flomax 0.8mg/day.\par here for TOV norton catheter removal. \par Denies dysuria, hematuria, fever or chills.

## 2021-08-16 ENCOUNTER — APPOINTMENT (OUTPATIENT)
Dept: UROLOGY | Facility: CLINIC | Age: 76
End: 2021-08-16
Payer: MEDICARE

## 2021-08-16 VITALS — DIASTOLIC BLOOD PRESSURE: 89 MMHG | HEART RATE: 105 BPM | SYSTOLIC BLOOD PRESSURE: 142 MMHG | TEMPERATURE: 97.7 F

## 2021-08-16 PROCEDURE — 99213 OFFICE O/P EST LOW 20 MIN: CPT | Mod: 25

## 2021-08-16 PROCEDURE — 51798 US URINE CAPACITY MEASURE: CPT

## 2021-08-16 NOTE — ASSESSMENT
[FreeTextEntry1] : 76 year old male Robotic-assisted laparoscopic cholecystectomy 8/9/2021 for cholelithiasis.\par Norton catheter was inserted for urinary retention (480 cc) August 9, 2021 \par TOV and norton removal 8/13/2021\par \par PVR 39 ml today - r/o urinary retention\par feels good, voiding well\par Advised patient to go to ER and call office if unable to urinate, sever pain in bladder, fever and chills. \par continue tamsulosin 0.8 mg\par \par Follow up as scheduled on 11/23/2021\par

## 2021-08-16 NOTE — HISTORY OF PRESENT ILLNESS
[None] : None [FreeTextEntry1] : 76 year old male Robotic-assisted laparoscopic cholecystectomy 8/9/2021 for cholelithiasis.\par Norton catheter was inserted for urinary retention (480 cc) August 9, 2021 \par TOV and norton removal 8/13/2021\par Returns for PVR check\par feels good. Voiding well. currently on Flomax 0.8 mg\par Denies pain, fever or chills.\par Denies dysuria, hematuria or flank pain

## 2021-08-23 LAB — SURGICAL PATHOLOGY STUDY: SIGNIFICANT CHANGE UP

## 2021-08-31 DIAGNOSIS — N39.0 URINARY TRACT INFECTION, SITE NOT SPECIFIED: ICD-10-CM

## 2021-09-01 ENCOUNTER — NON-APPOINTMENT (OUTPATIENT)
Age: 76
End: 2021-09-01

## 2021-09-08 ENCOUNTER — NON-APPOINTMENT (OUTPATIENT)
Age: 76
End: 2021-09-08

## 2021-09-08 LAB
ALBUMIN SERPL ELPH-MCNC: 4.4 G/DL
ALP BLD-CCNC: 60 U/L
ALT SERPL-CCNC: 45 U/L
ANION GAP SERPL CALC-SCNC: 13 MMOL/L
AST SERPL-CCNC: 35 U/L
BASOPHILS # BLD AUTO: 0.06 K/UL
BASOPHILS NFR BLD AUTO: 0.8 %
BILIRUB SERPL-MCNC: 1.3 MG/DL
BUN SERPL-MCNC: 16 MG/DL
CALCIUM SERPL-MCNC: 9.5 MG/DL
CHLORIDE SERPL-SCNC: 102 MMOL/L
CO2 SERPL-SCNC: 27 MMOL/L
CREAT SERPL-MCNC: 1.05 MG/DL
EOSINOPHIL # BLD AUTO: 0.29 K/UL
EOSINOPHIL NFR BLD AUTO: 3.7 %
ESTRADIOL SERPL-MCNC: 44 PG/ML
FSH SERPL-MCNC: 3.8 IU/L
GLUCOSE SERPL-MCNC: 90 MG/DL
HCT VFR BLD CALC: 42.5 %
HGB BLD-MCNC: 14.5 G/DL
IMM GRANULOCYTES NFR BLD AUTO: 0.3 %
LH SERPL-ACNC: 1.1 IU/L
LYMPHOCYTES # BLD AUTO: 1.38 K/UL
LYMPHOCYTES NFR BLD AUTO: 17.7 %
MAN DIFF?: NORMAL
MCHC RBC-ENTMCNC: 31.9 PG
MCHC RBC-ENTMCNC: 34.1 GM/DL
MCV RBC AUTO: 93.4 FL
MONOCYTES # BLD AUTO: 1.05 K/UL
MONOCYTES NFR BLD AUTO: 13.5 %
NEUTROPHILS # BLD AUTO: 5 K/UL
NEUTROPHILS NFR BLD AUTO: 64 %
PLATELET # BLD AUTO: 186 K/UL
POTASSIUM SERPL-SCNC: 4.2 MMOL/L
PROT SERPL-MCNC: 6.5 G/DL
RBC # BLD: 4.55 M/UL
RBC # FLD: 14 %
SODIUM SERPL-SCNC: 142 MMOL/L
WBC # FLD AUTO: 7.8 K/UL

## 2021-09-13 ENCOUNTER — NON-APPOINTMENT (OUTPATIENT)
Age: 76
End: 2021-09-13

## 2021-09-28 ENCOUNTER — NON-APPOINTMENT (OUTPATIENT)
Age: 76
End: 2021-09-28

## 2021-09-28 LAB
TESTOST BND SERPL-MCNC: 2.4 PG/ML
TESTOSTERONE TOTAL S: 1144 NG/DL

## 2021-10-26 ENCOUNTER — APPOINTMENT (OUTPATIENT)
Dept: FAMILY MEDICINE | Facility: CLINIC | Age: 76
End: 2021-10-26
Payer: MEDICARE

## 2021-10-26 VITALS
HEIGHT: 70 IN | RESPIRATION RATE: 16 BRPM | TEMPERATURE: 98.3 F | BODY MASS INDEX: 25.2 KG/M2 | SYSTOLIC BLOOD PRESSURE: 136 MMHG | WEIGHT: 176 LBS | HEART RATE: 74 BPM | OXYGEN SATURATION: 98 % | DIASTOLIC BLOOD PRESSURE: 75 MMHG

## 2021-10-26 DIAGNOSIS — Z86.39 PERSONAL HISTORY OF OTHER ENDOCRINE, NUTRITIONAL AND METABOLIC DISEASE: ICD-10-CM

## 2021-10-26 DIAGNOSIS — N28.89 OTHER SPECIFIED DISORDERS OF KIDNEY AND URETER: ICD-10-CM

## 2021-10-26 DIAGNOSIS — E27.8 OTHER SPECIFIED DISORDERS OF ADRENAL GLAND: ICD-10-CM

## 2021-10-26 DIAGNOSIS — I47.2 VENTRICULAR TACHYCARDIA: ICD-10-CM

## 2021-10-26 DIAGNOSIS — Z86.018 PERSONAL HISTORY OF OTHER BENIGN NEOPLASM: ICD-10-CM

## 2021-10-26 DIAGNOSIS — R33.8 OTHER RETENTION OF URINE: ICD-10-CM

## 2021-10-26 DIAGNOSIS — Z00.00 ENCOUNTER FOR GENERAL ADULT MEDICAL EXAMINATION W/OUT ABNORMAL FINDINGS: ICD-10-CM

## 2021-10-26 DIAGNOSIS — R79.89 OTHER SPECIFIED ABNORMAL FINDINGS OF BLOOD CHEMISTRY: ICD-10-CM

## 2021-10-26 DIAGNOSIS — Z01.818 ENCOUNTER FOR OTHER PREPROCEDURAL EXAMINATION: ICD-10-CM

## 2021-10-26 DIAGNOSIS — H25.013 CORTICAL AGE-RELATED CATARACT, BILATERAL: ICD-10-CM

## 2021-10-26 DIAGNOSIS — Z86.2 PERSONAL HISTORY OF DISEASES OF THE BLOOD AND BLOOD-FORMING ORGANS AND CERTAIN DISORDERS INVOLVING THE IMMUNE MECHANISM: ICD-10-CM

## 2021-10-26 DIAGNOSIS — I45.5 OTHER SPECIFIED HEART BLOCK: ICD-10-CM

## 2021-10-26 DIAGNOSIS — Z78.9 OTHER SPECIFIED HEALTH STATUS: ICD-10-CM

## 2021-10-26 DIAGNOSIS — Z80.1 FAMILY HISTORY OF MALIGNANT NEOPLASM OF TRACHEA, BRONCHUS AND LUNG: ICD-10-CM

## 2021-10-26 PROCEDURE — 36415 COLL VENOUS BLD VENIPUNCTURE: CPT

## 2021-10-26 PROCEDURE — G0439: CPT

## 2021-10-26 PROCEDURE — 81002 URINALYSIS NONAUTO W/O SCOPE: CPT

## 2021-10-26 PROCEDURE — G0442 ANNUAL ALCOHOL SCREEN 15 MIN: CPT | Mod: 59

## 2021-10-26 RX ORDER — CLOPIDOGREL BISULFATE 75 MG/1
75 TABLET, FILM COATED ORAL
Qty: 90 | Refills: 3 | Status: DISCONTINUED | COMMUNITY
Start: 2020-07-06 | End: 2021-10-26

## 2021-10-26 RX ORDER — DOCUSATE SODIUM 100 MG/1
CAPSULE ORAL 3 TIMES DAILY
Refills: 0 | Status: ACTIVE | COMMUNITY

## 2021-10-26 NOTE — HISTORY OF PRESENT ILLNESS
[FreeTextEntry1] : establish care  [de-identified] : 75 yo m presents to establish care. \par Presents with wife, Ninfa. \par Patient was diagnosed years ago with cushing's disease, was seen by endo, was sent to [facility], patient had very high cortisol count, underwent testing, kidney biopsy was normal, underwent pituitary surgery 2 years ago-- was normal. \par Patient has lost weight, loss sense of taste, sometimes slightly forgetful, cannot concentrate as well. Was started on testosterone, starting to taper down on it. Patient mentioned testosterone has caused him to feel anxious. \par Mentioned for anxiety takes small doses of valium and marijuana.  \par 6 years ago, the patient had an aortic valve replacement. \par Mentioned was seen by derm for rash. \par Fully vaccinated with covid, and boosted.

## 2021-10-26 NOTE — HEALTH RISK ASSESSMENT
[Good] : ~his/her~  mood as  good [] : No [1 or 2 (0 pts)] : 1 or 2 (0 points) [Never (0 pts)] : Never (0 points) [No] : In the past 12 months have you used drugs other than those required for medical reasons? No [1] : 2) Feeling down, depressed, or hopeless for several days (1) [PHQ-2 Negative - No further assessment needed] : PHQ-2 Negative - No further assessment needed [Audit-CScore] : 0 [de-identified] : walking, martial arts in the past  [de-identified] : fruits, veggies  [[id number]] : 2 [Patient reported colonoscopy was normal] : Patient reported colonoscopy was normal [HIV test declined] : HIV test declined [Hepatitis C test declined] : Hepatitis C test declined [Change in mental status noted] : No change in mental status noted [Language] : denies difficulty with language [None] : None [With Significant Other] : lives with significant other [Employed] : employed [] :  [Sexually Active] : sexually active [High Risk Behavior] : no high risk behavior [Feels Safe at Home] : Feels safe at home [Fully functional (bathing, dressing, toileting, transferring, walking, feeding)] : Fully functional (bathing, dressing, toileting, transferring, walking, feeding) [Fully functional (using the telephone, shopping, preparing meals, housekeeping, doing laundry, using] : Fully functional and needs no help or supervision to perform IADLs (using the telephone, shopping, preparing meals, housekeeping, doing laundry, using transportation, managing medications and managing finances) [Reports changes in hearing] : Reports no changes in hearing [Reports changes in vision] : Reports no changes in vision [Reports changes in dental health] : Reports no changes in dental health [ColonoscopyDate] : 01/18 [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

## 2021-10-27 ENCOUNTER — APPOINTMENT (OUTPATIENT)
Dept: OPHTHALMOLOGY | Facility: CLINIC | Age: 76
End: 2021-10-27
Payer: MEDICARE

## 2021-10-27 ENCOUNTER — NON-APPOINTMENT (OUTPATIENT)
Age: 76
End: 2021-10-27

## 2021-10-27 PROCEDURE — 92014 COMPRE OPH EXAM EST PT 1/>: CPT

## 2021-11-01 ENCOUNTER — NON-APPOINTMENT (OUTPATIENT)
Age: 76
End: 2021-11-01

## 2021-11-01 LAB
25(OH)D3 SERPL-MCNC: 40.4 NG/ML
ALBUMIN SERPL ELPH-MCNC: 4.8 G/DL
ALP BLD-CCNC: 60 U/L
ALT SERPL-CCNC: 49 U/L
ANION GAP SERPL CALC-SCNC: 14 MMOL/L
AST SERPL-CCNC: 39 U/L
BASOPHILS # BLD AUTO: 0.07 K/UL
BASOPHILS NFR BLD AUTO: 0.8 %
BILIRUB SERPL-MCNC: 2 MG/DL
BUN SERPL-MCNC: 20 MG/DL
CALCIUM SERPL-MCNC: 10.4 MG/DL
CHLORIDE SERPL-SCNC: 103 MMOL/L
CHOLEST SERPL-MCNC: 95 MG/DL
CO2 SERPL-SCNC: 27 MMOL/L
CREAT SERPL-MCNC: 0.96 MG/DL
EOSINOPHIL # BLD AUTO: 0.11 K/UL
EOSINOPHIL NFR BLD AUTO: 1.2 %
ESTIMATED AVERAGE GLUCOSE: 108 MG/DL
GLUCOSE SERPL-MCNC: 101 MG/DL
HBA1C MFR BLD HPLC: 5.4 %
HCT VFR BLD CALC: 48.6 %
HDLC SERPL-MCNC: 41 MG/DL
HGB BLD-MCNC: 16.1 G/DL
IMM GRANULOCYTES NFR BLD AUTO: 0.1 %
LDLC SERPL CALC-MCNC: 42 MG/DL
LYMPHOCYTES # BLD AUTO: 0.97 K/UL
LYMPHOCYTES NFR BLD AUTO: 10.5 %
MAN DIFF?: NORMAL
MCHC RBC-ENTMCNC: 31.6 PG
MCHC RBC-ENTMCNC: 33.1 GM/DL
MCV RBC AUTO: 95.3 FL
MONOCYTES # BLD AUTO: 1.01 K/UL
MONOCYTES NFR BLD AUTO: 11 %
NEUTROPHILS # BLD AUTO: 7.04 K/UL
NEUTROPHILS NFR BLD AUTO: 76.4 %
NONHDLC SERPL-MCNC: 54 MG/DL
PLATELET # BLD AUTO: 212 K/UL
POTASSIUM SERPL-SCNC: 4.5 MMOL/L
PROT SERPL-MCNC: 7.5 G/DL
PSA FREE FLD-MCNC: 27 %
PSA FREE SERPL-MCNC: 1.07 NG/ML
PSA SERPL-MCNC: 4.02 NG/ML
RBC # BLD: 5.1 M/UL
RBC # FLD: 14.2 %
SODIUM SERPL-SCNC: 144 MMOL/L
TRIGL SERPL-MCNC: 59 MG/DL
TSH SERPL-ACNC: 2.17 UIU/ML
WBC # FLD AUTO: 9.21 K/UL

## 2021-11-02 ENCOUNTER — NON-APPOINTMENT (OUTPATIENT)
Age: 76
End: 2021-11-02

## 2021-11-02 LAB
ALBUMIN SERPL ELPH-MCNC: 4.7 G/DL
ALP BLD-CCNC: 60 U/L
ALT SERPL-CCNC: 60 U/L
ANION GAP SERPL CALC-SCNC: 15 MMOL/L
AST SERPL-CCNC: 47 U/L
BASOPHILS # BLD AUTO: 0.05 K/UL
BASOPHILS NFR BLD AUTO: 0.8 %
BILIRUB SERPL-MCNC: 1.4 MG/DL
BUN SERPL-MCNC: 16 MG/DL
CALCIUM SERPL-MCNC: 9.6 MG/DL
CHLORIDE SERPL-SCNC: 104 MMOL/L
CO2 SERPL-SCNC: 24 MMOL/L
CREAT SERPL-MCNC: 0.91 MG/DL
EOSINOPHIL # BLD AUTO: 0.18 K/UL
EOSINOPHIL NFR BLD AUTO: 2.7 %
ESTRADIOL SERPL-MCNC: 14 PG/ML
FSH SERPL-MCNC: 5.6 IU/L
GLUCOSE SERPL-MCNC: 122 MG/DL
HCT VFR BLD CALC: 47.4 %
HGB BLD-MCNC: 17.4 G/DL
IMM GRANULOCYTES NFR BLD AUTO: 0.3 %
LH SERPL-ACNC: 2.5 IU/L
LYMPHOCYTES # BLD AUTO: 1.04 K/UL
LYMPHOCYTES NFR BLD AUTO: 15.8 %
MAN DIFF?: NORMAL
MCHC RBC-ENTMCNC: 33.9 PG
MCHC RBC-ENTMCNC: 36.7 GM/DL
MCV RBC AUTO: 92.2 FL
MONOCYTES # BLD AUTO: 0.65 K/UL
MONOCYTES NFR BLD AUTO: 9.9 %
NEUTROPHILS # BLD AUTO: 4.64 K/UL
NEUTROPHILS NFR BLD AUTO: 70.5 %
PLATELET # BLD AUTO: 249 K/UL
POTASSIUM SERPL-SCNC: 3.9 MMOL/L
PROT SERPL-MCNC: 7.3 G/DL
RBC # BLD: 5.14 M/UL
RBC # FLD: 13.5 %
SODIUM SERPL-SCNC: 143 MMOL/L
WBC # FLD AUTO: 6.58 K/UL

## 2021-11-03 ENCOUNTER — NON-APPOINTMENT (OUTPATIENT)
Age: 76
End: 2021-11-03

## 2021-11-03 ENCOUNTER — LABORATORY RESULT (OUTPATIENT)
Age: 76
End: 2021-11-03

## 2021-11-03 LAB — TESTOST SERPL-MCNC: 425 NG/DL

## 2021-11-15 ENCOUNTER — RX RENEWAL (OUTPATIENT)
Age: 76
End: 2021-11-15

## 2021-11-17 ENCOUNTER — APPOINTMENT (OUTPATIENT)
Dept: HEART AND VASCULAR | Facility: CLINIC | Age: 76
End: 2021-11-17
Payer: MEDICARE

## 2021-11-17 VITALS — SYSTOLIC BLOOD PRESSURE: 133 MMHG | DIASTOLIC BLOOD PRESSURE: 79 MMHG

## 2021-11-17 VITALS
DIASTOLIC BLOOD PRESSURE: 89 MMHG | SYSTOLIC BLOOD PRESSURE: 144 MMHG | WEIGHT: 175 LBS | BODY MASS INDEX: 25.05 KG/M2 | HEIGHT: 70 IN | OXYGEN SATURATION: 97 % | HEART RATE: 91 BPM

## 2021-11-17 LAB — PSA SERPL-MCNC: 2.81 NG/ML

## 2021-11-17 PROCEDURE — 99214 OFFICE O/P EST MOD 30 MIN: CPT

## 2021-11-17 NOTE — ASSESSMENT
[FreeTextEntry1] : 1. CAD: s/p CABG x 2 at time of AVR 04/25/15, SVG-LCx, SVG-RCA, Russ Lester MD at [facility]: 07/02/20: PCI prox/mid LAD and OM1.  \par      - will pursue aggressive medical management\par      - continue ASA 81mg po qd \par \par 2. Aortic stenosis: s/p bioprosthetic AVR, #25 Medtronic porcine, Russ Lester MD at [facility] 04/25/15, s/p echo 05/22/20: EF 67%, normally functioning bioprosthetic AVR, mild LVH, dilated LA. \par      - discussed with patient SBE prophylaxis per AHA guidelines\par      - will follow with serial echocardiograms\par \par 3. HTN: ? secondary to Cushing's syndrome: BP near ACC/AHA 2017 guideline target\par      - continue amlodipine 10mg po qd\par      - continue losartan//25mg po qd\par      - if BP remains above target next visit will up titrate anti-HTN regimen \par \par 4. PAD: s/p 05/06/21: sono: right dSFA 20-49%, right pPOP 20-49%, left CFA 20-49%, left XAVIER not occluded. \par      - will pursue aggressive medical management     \par      - will follow with serial bilateral lower extremity arterial sonography \par \par 5. 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 Antiphospholipid syndrome: no history of arterial or venous thrombosis:\par      - follow up with hematologist\par \par 8. Sick sinus syndrome: likely chronotropic incompetence\par     - s/p Biotronik dual chamber PPM with Franchesca Murcia MD on 08/20/20\par     - follow up with device clinic\par \par 9. Carotid atherosclerosis:\par      - will pursue aggressive medical management\par      - will follow with serial carotid artery sonograms\par \par 10. Depression and anxiety: \par      - discussed reducing the use of prn diazepam 10mg po qd\par      - will initiate a trial of sertraline 50mg po qd (possible adverse effects of new medications discussed) \par \par \par The patient was seen and examined with a cardiology fellow as part of their longitudinal ambulatory cardiology training experience.  Permission was obtained at the time of the visit from the patient for the fellow's participation.

## 2021-11-17 NOTE — REASON FOR VISIT
[Other: ____] : [unfilled] [FreeTextEntry1] : Diagnostic Tests:\par ----------------------------------\par ECG: \par 08/02/21: A-paced, V-sensed, incomplete RBBB. \par 04/14/21: A-paced, V-sensed, RSR' V1 without ST segment elevation. \par 08/03/20: sinus rhythm, incomplete RBBB. \par 07/13/20: sinus rhythm, incomplete RBBB. \par 06/16/20: sinus bradycardia at 42 bpm, incomplete RBBB. \par 05/28/20: sinus bradycardia at 53 bpm, incomplete RBBB. \par 03/03/20: marked sinus bradycardia at 49 bpm, incomplete RBBB. \par ----------------------------------\par MR:\par 08/12/19: abdomen: left adrenal nodule 2.7cm, 2cm right renal cell carcinoma, hepatic steatosis. \par ----------------------------------\par CT:\par 06/16/20: CTCA: SVG-distal RCA patent, SVG-OM occluded, LM normal, LAD prox severe, mid moderate, D1 moderate, D2 mild, LCx mild, OM1 severe, LPL normal, RCA prox severe, mid occluded, distal with patent graft.\par 03/10/20: CT abdomen/pelvis: s/p excision right renal mass, cyst lateral to aortic bifurcation, aorta-iliac atherosclerotic calcification. \par 08/09/19: CTA chest/abdomen/pelvis: no aortic dissection, + severe aortic and coronary plaque, aortic valve calcifications, 3.6cm left adrenal mass, 2cm right renal mass. \par ---------------------------------\par Echo:\par 05/06/21: EF 69%, mild LVH, grade I diastolic dysfunction, normally functioning bioprosthetic AVR, PPM. \par 05/22/20: EF 67%, normally functioning bioprosthetic AVR, mild LVH, dilated LA. 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 \par 01/1019: EF 55%, LVH, dilated LA, bioprosthetic AVR normally functioning, dilated ascending aorta. \par ---------------------------------\par Stress:\par 03/09/20: exercise stress echo: EF 63%, mild MR, s/p bioprosthetic AVR, normal wall motion, PA pressures, and ECG post 10.1 METs. \par 07/21/15: ETT: 7 minutes of Wilson, no ischemia. \par ---------------------------------\par Cath:\par 07/02/20: LM mild, LAD prox and mid 80% (FFR +) underwent PCI, LCx mild, OM1 90% underwent PCI, RCA prox 100%, patent SVG-RCA, occluded SVG-OM1.  \par 03/03/15: LM normal, LAD prox 60%, distal 70%, LCX 80%, OM1 80%, RCA prox 60%, AV peak-to-peak 49mmHg, MARYLOU 0.9cm2.\par ---------------------------------\par Monitor:\par 06/04/20 to 06/30/20: Biotel MCT: HR 25/50/120, <1% PVCs, <1% APCS, 18 pauses > 2 seconds, longest > 4 seconds, no AF.\par ---------------------------------\par Lower extremity arteries:\par 05/06/21: sono: right dSFA 20-49%, right pPOP 20-49%, left CFA 20-49%, left XAVIER not occluded. \par 08/18/20: sono: left XAVIER 100% mid, left CFA 20-49% stenosis, right CFA <20% stenosis. \par ----------------------------------\par Carotid arteries:\par 05/06/21: sono: b/l prox ICA 20-49% stenosis.

## 2021-11-17 NOTE — HISTORY OF PRESENT ILLNESS
[FreeTextEntry1] : Mr. Castorena presents for follow up and management of CAD s/p CABG x 2 (04/12/15 at time of AVR) s/p PCI prox/mid LAD and OM1 (07/02/20), aortic stenosis s/p bioprosthetic AVR (04/12/15), aortic atherosclerosis, HTN, Cushing's syndrome, PAD, sick sinus syndrome s/p Biotronik PPM (08/20/20), and antiphospholipid syndrome.   He was previously followed by Guillermo Muir MD.  On 04/12/15 he underwent a bioprosthetic AVR (# 25 Medtronic porcine) and CABG x 2 with Russ Lester MD at [facility].  In August, 2019 he had complaints of abdominal pain and underwent a CTA chest/abdomen/pelvis on 08/19/19 which revealed no aortic dissection, + severe aortic and coronary plaque, aortic valve calcifications, a 3.6cm left adrenal mass, and 2cm right renal mass.  He had removal of the right renal mass which was benign.   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  He is following with a neurosurgeon, Carol Flores MD, and a head and neck surgeon, Javier Arguelles MD, at [facility] and had pituitary surgery on 05/16/20.  On 05/26/20 I received a call from a cardiologist at [facility], Angelica Katz MD, informing me of a 16 beat run of NSVT during his post operative course.  The run was asymptomatic.  He had an echocardiogram on 05/22/20 which revealed an EF of 67%, normally functioning bioprosthetic AVR, mild LVH, and a dilated LA.  He was initiated on Toprol XL 50mg po QD.  On 06/11/20 had a 3.6 second pause during bowel movement noted on MCT monitor.  We agreed to see a heart rhythm specialist. He was evaluated by Franchesca Murcia MD and was tapered off beta blockers.  On 06/16/20 he underwent a CTCA which revealed SVG-distal RCA patent, SVG-OM occluded, LM normal, LAD prox severe, mid moderate, D1 moderate, D2 mild, LCx mild, OM1 severe, LPL normal, RCA prox severe, mid occluded, and distal with patent graft.  He underwent invasive coronary angiography on 07/02/20 which revealed LM mild, LAD prox and mid 80% (FFR +) underwent PCI, LCx mild, OM1 90% underwent PCI, RCA prox 100%, patent SVG-RCA, occluded SVG-OM1.  The following day he had a groin bleed and a sinus pause overnight but otherwise did well.  On 08/18/20 he underwent bilateral lower extremity arterial sonography which revealed left XAVIER 100% mid, left CFA 20-49% stenosis, and right CFA <20% stenosis.  He underwent implantation of a Bitronik dual chamber pacemaker on 08/20/20.  On 05/06/21 he had an echocardiogram which revealed an EF of 69%, mild LVH, grade I diastolic dysfunction, normally functioning bioprosthetic AVR, and a PPM.   He is postoperative from cholecystectomy with Caitlin Acosta MD on Monday 08/09/21.  He has complaints of depression and anxiety but is otherwise well. \par

## 2021-11-19 ENCOUNTER — APPOINTMENT (OUTPATIENT)
Dept: UROLOGY | Facility: CLINIC | Age: 76
End: 2021-11-19
Payer: MEDICARE

## 2021-11-19 VITALS — TEMPERATURE: 98.3 F | SYSTOLIC BLOOD PRESSURE: 145 MMHG | HEART RATE: 93 BPM | DIASTOLIC BLOOD PRESSURE: 83 MMHG

## 2021-11-19 PROCEDURE — 99215 OFFICE O/P EST HI 40 MIN: CPT

## 2021-11-21 ENCOUNTER — RX RENEWAL (OUTPATIENT)
Age: 76
End: 2021-11-21

## 2021-11-22 ENCOUNTER — TRANSCRIPTION ENCOUNTER (OUTPATIENT)
Age: 76
End: 2021-11-22

## 2021-11-22 NOTE — PHYSICAL EXAM
[General Appearance - Well Developed] : well developed [General Appearance - Well Nourished] : well nourished [Normal Appearance] : normal appearance [Well Groomed] : well groomed [General Appearance - In No Acute Distress] : no acute distress [Abdomen Soft] : soft [Abdomen Tenderness] : non-tender [Costovertebral Angle Tenderness] : no ~M costovertebral angle tenderness [Urethral Meatus] : meatus normal [Urinary Bladder Findings] : the bladder was normal on palpation [Scrotum] : the scrotum was normal [Testes Mass (___cm)] : there were no testicular masses [Edema] : no peripheral edema [] : no respiratory distress [Respiration, Rhythm And Depth] : normal respiratory rhythm and effort [Exaggerated Use Of Accessory Muscles For Inspiration] : no accessory muscle use [Oriented To Time, Place, And Person] : oriented to person, place, and time [Affect] : the affect was normal [Mood] : the mood was normal [Not Anxious] : not anxious [Normal Station and Gait] : the gait and station were normal for the patient's age [No Focal Deficits] : no focal deficits [No Palpable Adenopathy] : no palpable adenopathy [FreeTextEntry1] : deferred exam

## 2021-11-22 NOTE — ASSESSMENT
[FreeTextEntry1] : Hypogonadism \par PSA  2.81 Nov 2021\par TT peak 1039 - 11/4/21\par       trough 425 - 11/3/21\par HCT - 17.4 \par Continue T Cypionate dose decreased from 0.4 to  0.3 ml  - refilled \par Improved libido and aggression\par Check labs in 4 weeks with peak and trough \par Advised to follow up with PCP for elevated AST \par \par Follow up in 6 months. \par

## 2021-11-22 NOTE — HISTORY OF PRESENT ILLNESS
[None] : None [FreeTextEntry1] : 76M with h/o of CAD [(s/p CABGx2 + bioprostetic AVR in 2015), s/p PCI prox/mid LAD and OM1 (2020)], PAD, HTN, Cushing's syndrome (pituitary surgery in May 2021), sick sinus syndrome (s/p Biotronik PPM in 2020), antiphospholipid syndrome, right partial nephrectomy for right benign renal mass (2019)\par returns for follow up for hypogonadism \par PSA  2.81 Nov 2021\par TT peak 1039 - 11/4/21\par       trough 425 - 11/3/21\par HCT - 17.4 \par AST 47\par  ALT  60 \par reports aggression is better\par Currently on T Cypionate 0.3 ml from 0.4 ml \par Takes Diazepam 2 mg  and smokes marijuana daily\par denies urinary problems \par Denies dysuria, hematuria, and flank pain \par

## 2021-11-29 ENCOUNTER — APPOINTMENT (OUTPATIENT)
Dept: FAMILY MEDICINE | Facility: CLINIC | Age: 76
End: 2021-11-29
Payer: MEDICARE

## 2021-11-29 ENCOUNTER — APPOINTMENT (OUTPATIENT)
Dept: HEART AND VASCULAR | Facility: CLINIC | Age: 76
End: 2021-11-29
Payer: MEDICARE

## 2021-11-29 VITALS
SYSTOLIC BLOOD PRESSURE: 160 MMHG | DIASTOLIC BLOOD PRESSURE: 80 MMHG | HEART RATE: 66 BPM | HEIGHT: 70 IN | BODY MASS INDEX: 22.76 KG/M2 | WEIGHT: 159 LBS | OXYGEN SATURATION: 99 % | TEMPERATURE: 97.5 F

## 2021-11-29 VITALS — DIASTOLIC BLOOD PRESSURE: 68 MMHG | SYSTOLIC BLOOD PRESSURE: 120 MMHG

## 2021-11-29 VITALS
HEIGHT: 70 IN | TEMPERATURE: 97.5 F | BODY MASS INDEX: 22.9 KG/M2 | HEART RATE: 72 BPM | DIASTOLIC BLOOD PRESSURE: 82 MMHG | SYSTOLIC BLOOD PRESSURE: 134 MMHG | WEIGHT: 160 LBS

## 2021-11-29 DIAGNOSIS — K76.0 FATTY (CHANGE OF) LIVER, NOT ELSEWHERE CLASSIFIED: ICD-10-CM

## 2021-11-29 PROCEDURE — 99214 OFFICE O/P EST MOD 30 MIN: CPT | Mod: 25

## 2021-11-29 PROCEDURE — 93280 PM DEVICE PROGR EVAL DUAL: CPT

## 2021-11-30 NOTE — PLAN
[FreeTextEntry1] : discussed risks benefits alternatives side effects expected of meds \par reviewed options \par will stop zoloft \par will start wellbutrin, follow up in 30 days, sooner if needed \par follow up liver us for fatty liver hx \par hx of cushings, interested for piece of mind adrenal involvement \par will order fecal testing in setting of normal cscope <5 years but still with loss of appetite and weight loss

## 2021-11-30 NOTE — HISTORY OF PRESENT ILLNESS
[FreeTextEntry8] : cc: depression \par \par 75 yo m presents with hx of depression, anxiety. \par Did not like zoloft, took one dose, mentioned caused nausea so he stopped.\par Mentioned smokes weed to assist with calming him down. \par Mentioned he would like to try something else. \par Has lost weight, not eating much, little appetite. \par Normal cscope less than 5 years ago, labs recent and overall normal as well.

## 2021-12-01 NOTE — DISCUSSION/SUMMARY
[FreeTextEntry1] : Mr. Castorena is a 77 y/o M with sinus node dysfunction s/p PPM-D (Biotronik) 8/20/2020.  His device is functioning appropriately as programmed and all measured data is WNL.  There were no arrhythmia events for review.  He is enrolled in remote monitoring and was asked to return for follow-up in six months.  All questions were answered.

## 2021-12-01 NOTE — HISTORY OF PRESENT ILLNESS
[FreeTextEntry1] : Mr. Castorena is a 75 y/o M with history of bioprosthetic AVR / CABG x 2 in 2015 at [facility] (SVG-RCA and SVG-LCx), aortic atherosclerosis, HTN, right renal mass resection (benign), left adrenal mass, Cushing's disease likely secondary to pituitary tumor s/p surgery at [facility] 5/2020, antiphospholipid syndrome, and sinus pauses now s/p PPM-D (Biotronik) 8/20/2020.  He is complaining of a depressed mood and his wife notes unintentional weight loss.  Work-up ongoing with Dr. Mccoy.

## 2021-12-01 NOTE — PROCEDURE
[No] : not [Sinus Bradycardia] : sinus bradycardia [Pacemaker] : pacemaker [Biotronik Biomedical] : Biotronik [employer] [Threshold Testing Performed] : Threshold testing was performed [Lead Imp:  ___ohms] : lead impedance was [unfilled] ohms [Sensing Amplitude ___mv] : sensing amplitude was [unfilled] mv [___V @] : [unfilled] V [___ ms] : [unfilled] ms [None] : none [de-identified] : ~ 40 bpm [de-identified] : Sheamr 8-DRT [de-identified] : [id number] [de-identified] : 8/20/2020 [de-identified] : DDD-CLS [de-identified] :  [de-identified] : 90% remaining battery  [de-identified] : AP 94%\par  0%\par No A or V events

## 2021-12-06 ENCOUNTER — RX RENEWAL (OUTPATIENT)
Age: 76
End: 2021-12-06

## 2021-12-16 ENCOUNTER — TRANSCRIPTION ENCOUNTER (OUTPATIENT)
Age: 76
End: 2021-12-16

## 2021-12-16 LAB — HEMOCCULT STL QL IA: NEGATIVE

## 2021-12-20 ENCOUNTER — TRANSCRIPTION ENCOUNTER (OUTPATIENT)
Age: 76
End: 2021-12-20

## 2021-12-21 ENCOUNTER — NON-APPOINTMENT (OUTPATIENT)
Age: 76
End: 2021-12-21

## 2022-01-20 ENCOUNTER — NON-APPOINTMENT (OUTPATIENT)
Age: 77
End: 2022-01-20

## 2022-02-23 ENCOUNTER — NON-APPOINTMENT (OUTPATIENT)
Age: 77
End: 2022-02-23

## 2022-02-23 RX ORDER — KRILL/OM-3/DHA/EPA/PHOSPHO/AST 1000-230MG
81 CAPSULE ORAL
Qty: 90 | Refills: 3 | Status: DISCONTINUED | COMMUNITY
Start: 2020-11-23 | End: 2022-02-23

## 2022-03-09 ENCOUNTER — NON-APPOINTMENT (OUTPATIENT)
Age: 77
End: 2022-03-09

## 2022-03-09 ENCOUNTER — APPOINTMENT (OUTPATIENT)
Dept: FAMILY MEDICINE | Facility: CLINIC | Age: 77
End: 2022-03-09
Payer: MEDICARE

## 2022-03-09 ENCOUNTER — APPOINTMENT (OUTPATIENT)
Dept: HEART AND VASCULAR | Facility: CLINIC | Age: 77
End: 2022-03-09
Payer: MEDICARE

## 2022-03-09 VITALS
WEIGHT: 160 LBS | BODY MASS INDEX: 22.9 KG/M2 | SYSTOLIC BLOOD PRESSURE: 138 MMHG | TEMPERATURE: 97.8 F | HEART RATE: 70 BPM | HEIGHT: 70 IN | OXYGEN SATURATION: 99 % | DIASTOLIC BLOOD PRESSURE: 76 MMHG

## 2022-03-09 VITALS
OXYGEN SATURATION: 95 % | HEIGHT: 70 IN | SYSTOLIC BLOOD PRESSURE: 140 MMHG | DIASTOLIC BLOOD PRESSURE: 84 MMHG | BODY MASS INDEX: 22.33 KG/M2 | HEART RATE: 90 BPM | WEIGHT: 156 LBS

## 2022-03-09 VITALS — HEART RATE: 77 BPM | DIASTOLIC BLOOD PRESSURE: 62 MMHG | SYSTOLIC BLOOD PRESSURE: 108 MMHG

## 2022-03-09 DIAGNOSIS — R41.840 ATTENTION AND CONCENTRATION DEFICIT: ICD-10-CM

## 2022-03-09 DIAGNOSIS — R41.3 OTHER AMNESIA: ICD-10-CM

## 2022-03-09 DIAGNOSIS — R63.4 ABNORMAL WEIGHT LOSS: ICD-10-CM

## 2022-03-09 PROCEDURE — 36415 COLL VENOUS BLD VENIPUNCTURE: CPT

## 2022-03-09 PROCEDURE — 99214 OFFICE O/P EST MOD 30 MIN: CPT | Mod: 25

## 2022-03-09 PROCEDURE — 93000 ELECTROCARDIOGRAM COMPLETE: CPT

## 2022-03-09 NOTE — REASON FOR VISIT
[FreeTextEntry1] : Diagnostic Tests:\par ----------------------------------\par ECG: \par 03/09/22: A-paced, V-sensed, incomplete RBBB. \par 08/02/21: A-paced, V-sensed, incomplete RBBB. \par 04/14/21: A-paced, V-sensed, RSR' V1 without ST segment elevation. \par 08/03/20: sinus rhythm, incomplete RBBB. \par 07/13/20: sinus rhythm, incomplete RBBB. \par 06/16/20: sinus bradycardia at 42 bpm, incomplete RBBB. \par 05/28/20: sinus bradycardia at 53 bpm, incomplete RBBB. \par 03/03/20: marked sinus bradycardia at 49 bpm, incomplete RBBB. \par ----------------------------------\par MR:\par 08/12/19: abdomen: left adrenal nodule 2.7cm, 2cm right renal cell carcinoma, hepatic steatosis. \par ----------------------------------\par CT:\par 06/16/20: CTCA: SVG-distal RCA patent, SVG-OM occluded, LM normal, LAD prox severe, mid moderate, D1 moderate, D2 mild, LCx mild, OM1 severe, LPL normal, RCA prox severe, mid occluded, distal with patent graft.\par 03/10/20: CT abdomen/pelvis: s/p excision right renal mass, cyst lateral to aortic bifurcation, aorta-iliac atherosclerotic calcification. \par 08/09/19: CTA chest/abdomen/pelvis: no aortic dissection, + severe aortic and coronary plaque, aortic valve calcifications, 3.6cm left adrenal mass, 2cm right renal mass. \par ---------------------------------\par Echo:\par 05/06/21: EF 69%, mild LVH, grade I diastolic dysfunction, normally functioning bioprosthetic AVR, PPM. \par 05/22/20: EF 67%, normally functioning bioprosthetic AVR, mild LVH, dilated LA. 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 \par 01/1019: EF 55%, LVH, dilated LA, bioprosthetic AVR normally functioning, dilated ascending aorta. \par ---------------------------------\par Stress:\par 03/09/20: exercise stress echo: EF 63%, mild MR, s/p bioprosthetic AVR, normal wall motion, PA pressures, and ECG post 10.1 METs. \par 07/21/15: ETT: 7 minutes of Wilson, no ischemia. \par ---------------------------------\par Cath:\par 07/02/20: LM mild, LAD prox and mid 80% (FFR +) underwent PCI, LCx mild, OM1 90% underwent PCI, RCA prox 100%, patent SVG-RCA, occluded SVG-OM1.  \par 03/03/15: LM normal, LAD prox 60%, distal 70%, LCX 80%, OM1 80%, RCA prox 60%, AV peak-to-peak 49mmHg, MARYLOU 0.9cm2.\par ---------------------------------\par Monitor:\par 06/04/20 to 06/30/20: Biotel MCT: HR 25/50/120, <1% PVCs, <1% APCS, 18 pauses > 2 seconds, longest > 4 seconds, no AF.\par ---------------------------------\par Lower extremity arteries:\par 05/06/21: sono: right dSFA 20-49%, right pPOP 20-49%, left CFA 20-49%, left XAVIER not occluded. \par 08/18/20: sono: left XAVIER 100% mid, left CFA 20-49% stenosis, right CFA <20% stenosis. \par ----------------------------------\par Carotid arteries:\par 05/06/21: sono: b/l prox ICA 20-49% stenosis.

## 2022-03-09 NOTE — HISTORY OF PRESENT ILLNESS
[FreeTextEntry1] : Mr. Castorena presents for follow up and management of CAD s/p CABG x 2 (SVG->RCA, SVG->LCX) (04/12/15 at time of AVR), s/p PCI prox/mid LAD and OM1 (07/02/20), aortic stenosis s/p bioprosthetic AVR (04/12/15), aortic atherosclerosis, HTN, Cushing's syndrome, PAD, sick sinus syndrome s/p Biotronik PPM (08/20/20), paroxysmal atrial fibrillation, and antiphospholipid syndrome.   He was previously followed by Guillermo Muir MD.  On 04/12/15 he underwent a bioprosthetic AVR (# 25 Medtronic porcine) and CABG x 2 with Russ Lester MD at [facility].  In August, 2019 he had complaints of abdominal pain and underwent a CTA chest/abdomen/pelvis on 08/19/19 which revealed no aortic dissection, + severe aortic and coronary plaque, aortic valve calcifications, a 3.6cm left adrenal mass, and 2cm right renal mass.  He had removal of the right renal mass which was benign.   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  He is following with a neurosurgeon, Carol Flores MD, and a head and neck surgeon, Javier Arguelles MD, at [facility] and had pituitary surgery on 05/16/20.  On 05/26/20 I received a call from a cardiologist at [facility] [place] [place], Angelica Katz MD, informing me of a 16 beat run of NSVT during his post operative course.  The run was asymptomatic. He was initiated on Toprol XL 50mg po QD.  On 06/11/20 had a 3.6 second pause during bowel movement noted on MCT monitor.  We agreed to see a heart rhythm specialist. He was evaluated by Franchesca Murcia MD and was tapered off beta blockers.  On 06/16/20 he underwent a CTCA which revealed SVG-distal RCA patent, SVG-OM occluded, LM normal, LAD prox severe, mid moderate, D1 moderate, D2 mild, LCx mild, OM1 severe, LPL normal, RCA prox severe, mid occluded, and distal with patent graft.  He underwent invasive coronary angiography on 07/02/20 which revealed LM mild, LAD prox and mid 80% (FFR +) underwent PCI, LCx mild, OM1 90% underwent PCI, RCA prox 100%, patent SVG-RCA, occluded SVG-OM1.  The following day he had a groin bleed and a sinus pause overnight but otherwise did well.  On 08/18/20 he underwent bilateral lower extremity arterial sonography which revealed left XAVIER 100% mid, left CFA 20-49% stenosis, and right CFA <20% stenosis.  He underwent implantation of a Bitronik dual chamber pacemaker on 08/20/20.  On 05/06/21 he had an echocardiogram which revealed an EF of 69%, mild LVH, grade I diastolic dysfunction, normally functioning bioprosthetic AVR, and a PPM.   In February, 2022 his device interrogation revealed paroxysmal atrial fibrillation.  Eliquis was prohibitively expensive.  He has complaints of depression and anxiety resulting in low appetite and forgetfulness.  He saw his PMD today, 03/09/22, who referred him to a neuropsychiatrist.  In addition, he has complaints of cold intolerance.  \par

## 2022-03-09 NOTE — ASSESSMENT
[FreeTextEntry1] : 1. CAD: s/p CABG x 2 at time of AVR 04/25/15, SVG-LCx, SVG-RCA, Russ Lester MD at [facility]: 07/02/20: PCI prox/mid LAD and OM1.  \par      - will pursue aggressive medical management\par      - continue ASA 81mg po qd \par      - will send for an echocardiogram to assess LV function \par \par 2. Aortic stenosis: s/p bioprosthetic AVR, #25 Medtronic porcine, Russ Lester MD at [facility] 04/25/15, s/p echo 05/22/20: EF 67%, normally functioning bioprosthetic AVR, mild LVH, dilated LA. \par      - discussed with patient SBE prophylaxis per AHA guidelines\par      - will follow with serial echocardiograms (ordered today) \par \par 3. HTN: ? secondary to Cushing's syndrome (cured): BP at ACC/AHA 2017 guideline target\par      - continue amlodipine 10mg po qd\par      - continue losartan//25mg po qd\par \par 4. PAD: s/p 05/06/21: sono: right dSFA 20-49%, right pPOP 20-49%, left CFA 20-49%, left XAVIER not occluded. \par      - will pursue aggressive medical management     \par      - will follow with serial bilateral lower extremity arterial sonography (ordered today)\par \par 5. Dyslipidemia: lipids at goal\par      - continue rosuvastatin 40mg po qd\par      - discussed therapeutic lifestyle changes to promote improved lipid metabolism \par      - check lab work today\par \par 6. 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 Antiphospholipid syndrome: no history of arterial or venous thrombosis, details unclear not antocoagulated for this diagnosis:\par      - follow up with hematologist, Chris Lima MD\par \par 8. Sick sinus syndrome: likely chronotropic incompetence\par     - s/p Biotronik dual chamber PPM with Franchesca Murcia MD on 08/20/20\par     - follow up with device clinic\par \par 9. Carotid atherosclerosis:\par      - will pursue aggressive medical management\par      - will follow with serial carotid artery sonograms (ordered today) \par \par 10. Depression and anxiety: \par      - discussed reducing the use of prn diazepam 10mg po qd\par      - Wellbutrin prescribed by PMD (will initiate it soon)\par \par 11. Atrial fibrillation, paroxysmal:  Eliquis prohibitively expensive\par      - will initiate systemic anticoagulation with Xarelto 20mg po qd (with largest meal of the day)\par      - discussed with patient avoidance of ASA and NSAIDS as well as need for bleeding surveillance \par \par \par An ECG was obtained to evaluate heart rhythm and screen for any underlying cardiac abnormalities. \par

## 2022-03-10 LAB
ALBUMIN SERPL ELPH-MCNC: 4.6 G/DL
ALP BLD-CCNC: 51 U/L
ALT SERPL-CCNC: 67 U/L
ANION GAP SERPL CALC-SCNC: 16 MMOL/L
AST SERPL-CCNC: 45 U/L
BASOPHILS # BLD AUTO: 0.05 K/UL
BASOPHILS NFR BLD AUTO: 0.4 %
BILIRUB SERPL-MCNC: 1.8 MG/DL
BUN SERPL-MCNC: 18 MG/DL
CALCIUM SERPL-MCNC: 9.9 MG/DL
CHLORIDE SERPL-SCNC: 101 MMOL/L
CHOLEST SERPL-MCNC: 79 MG/DL
CO2 SERPL-SCNC: 25 MMOL/L
CREAT SERPL-MCNC: 0.9 MG/DL
EGFR: 89 ML/MIN/1.73M2
EOSINOPHIL # BLD AUTO: 0.07 K/UL
EOSINOPHIL NFR BLD AUTO: 0.6 %
ESTIMATED AVERAGE GLUCOSE: 108 MG/DL
GLUCOSE SERPL-MCNC: 90 MG/DL
HBA1C MFR BLD HPLC: 5.4 %
HCT VFR BLD CALC: 46.5 %
HDLC SERPL-MCNC: 47 MG/DL
HGB BLD-MCNC: 15.5 G/DL
IMM GRANULOCYTES NFR BLD AUTO: 0.3 %
LDLC SERPL CALC-MCNC: 22 MG/DL
LDLC SERPL DIRECT ASSAY-MCNC: 28 MG/DL
LYMPHOCYTES # BLD AUTO: 0.86 K/UL
LYMPHOCYTES NFR BLD AUTO: 7.4 %
MAN DIFF?: NORMAL
MCHC RBC-ENTMCNC: 32.6 PG
MCHC RBC-ENTMCNC: 33.3 GM/DL
MCV RBC AUTO: 97.9 FL
MONOCYTES # BLD AUTO: 0.99 K/UL
MONOCYTES NFR BLD AUTO: 8.5 %
NEUTROPHILS # BLD AUTO: 9.58 K/UL
NEUTROPHILS NFR BLD AUTO: 82.8 %
NONHDLC SERPL-MCNC: 33 MG/DL
NT-PROBNP SERPL-MCNC: 172 PG/ML
PLATELET # BLD AUTO: 201 K/UL
POTASSIUM SERPL-SCNC: 3.4 MMOL/L
PROT SERPL-MCNC: 6.9 G/DL
PSA SERPL-MCNC: 2.62 NG/ML
RBC # BLD: 4.75 M/UL
RBC # FLD: 13.2 %
SODIUM SERPL-SCNC: 142 MMOL/L
T3FREE SERPL-MCNC: 2.48 PG/ML
T4 FREE SERPL-MCNC: 1.4 NG/DL
TESTOST SERPL-MCNC: 543 NG/DL
TRIGL SERPL-MCNC: 56 MG/DL
TSH SERPL-ACNC: 0.97 UIU/ML
WBC # FLD AUTO: 11.59 K/UL

## 2022-03-11 NOTE — PLAN
[FreeTextEntry1] : follow up labs, labs drawn in office \par extensive conversation about medication, compliance, and how marijuana can be affecting his brain chemistry

## 2022-03-11 NOTE — HISTORY OF PRESENT ILLNESS
[FreeTextEntry1] : follow up medications  [de-identified] : 78 yo m presents to discuss medication. \par Has not yet started wellbutrin. \par \par Over the past few months, continued loss of appetite. \par Memory has not been great-- mentioned forgetful, more overtime. Forgetful during conversations, will set the table and forget some table settings, will start cleaning and walk away to do something else. Mentioned moments of confusion.  \par Mentioned he has cold (even in [place]), although he has lost a significant amount of weight. \par Increased anxiety. Was on 6 mg of valium daily. Mentioned attributed this to being out of his comfort and in [place]. \par Mentioned less anxious since he has been back in [place] from [place].  \par Still smoking marijuana. \par \par Mentioned also in a. fib a few weeks ago. Cardio visit pending.

## 2022-03-14 ENCOUNTER — NON-APPOINTMENT (OUTPATIENT)
Age: 77
End: 2022-03-14

## 2022-03-14 ENCOUNTER — APPOINTMENT (OUTPATIENT)
Dept: HEART AND VASCULAR | Facility: CLINIC | Age: 77
End: 2022-03-14
Payer: MEDICARE

## 2022-03-14 VITALS
WEIGHT: 164 LBS | TEMPERATURE: 97.1 F | HEART RATE: 68 BPM | DIASTOLIC BLOOD PRESSURE: 62 MMHG | HEIGHT: 69 IN | SYSTOLIC BLOOD PRESSURE: 108 MMHG | BODY MASS INDEX: 24.29 KG/M2

## 2022-03-14 LAB
BASOPHILS # BLD AUTO: 0.06 K/UL
BASOPHILS NFR BLD AUTO: 0.7 %
EOSINOPHIL # BLD AUTO: 0.17 K/UL
EOSINOPHIL NFR BLD AUTO: 1.9 %
ESTRADIOL SERPL-MCNC: 6 PG/ML
FSH SERPL-MCNC: 18.7 IU/L
HCT VFR BLD CALC: 46.7 %
HGB BLD-MCNC: 15.8 G/DL
IMM GRANULOCYTES NFR BLD AUTO: 0.3 %
LH SERPL-ACNC: 8.5 IU/L
LYMPHOCYTES # BLD AUTO: 1.04 K/UL
LYMPHOCYTES NFR BLD AUTO: 11.8 %
MAN DIFF?: NORMAL
MCHC RBC-ENTMCNC: 32.1 PG
MCHC RBC-ENTMCNC: 33.8 GM/DL
MCV RBC AUTO: 94.9 FL
MONOCYTES # BLD AUTO: 0.9 K/UL
MONOCYTES NFR BLD AUTO: 10.2 %
NEUTROPHILS # BLD AUTO: 6.63 K/UL
NEUTROPHILS NFR BLD AUTO: 75.1 %
PLATELET # BLD AUTO: 190 K/UL
RBC # BLD: 4.92 M/UL
RBC # FLD: 13.2 %
WBC # FLD AUTO: 8.83 K/UL

## 2022-03-14 PROCEDURE — 99213 OFFICE O/P EST LOW 20 MIN: CPT | Mod: 25

## 2022-03-14 PROCEDURE — 93280 PM DEVICE PROGR EVAL DUAL: CPT

## 2022-03-16 ENCOUNTER — APPOINTMENT (OUTPATIENT)
Dept: OPHTHALMOLOGY | Facility: CLINIC | Age: 77
End: 2022-03-16
Payer: MEDICARE

## 2022-03-16 ENCOUNTER — NON-APPOINTMENT (OUTPATIENT)
Age: 77
End: 2022-03-16

## 2022-03-16 ENCOUNTER — APPOINTMENT (OUTPATIENT)
Dept: ULTRASOUND IMAGING | Facility: HOSPITAL | Age: 77
End: 2022-03-16
Payer: MEDICARE

## 2022-03-16 ENCOUNTER — OUTPATIENT (OUTPATIENT)
Dept: OUTPATIENT SERVICES | Facility: HOSPITAL | Age: 77
LOS: 1 days | End: 2022-03-16
Payer: MEDICARE

## 2022-03-16 DIAGNOSIS — N28.89 OTHER SPECIFIED DISORDERS OF KIDNEY AND URETER: Chronic | ICD-10-CM

## 2022-03-16 DIAGNOSIS — Z98.61 CORONARY ANGIOPLASTY STATUS: Chronic | ICD-10-CM

## 2022-03-16 DIAGNOSIS — Z90.49 ACQUIRED ABSENCE OF OTHER SPECIFIED PARTS OF DIGESTIVE TRACT: Chronic | ICD-10-CM

## 2022-03-16 DIAGNOSIS — Z95.1 PRESENCE OF AORTOCORONARY BYPASS GRAFT: Chronic | ICD-10-CM

## 2022-03-16 DIAGNOSIS — Z98.890 OTHER SPECIFIED POSTPROCEDURAL STATES: Chronic | ICD-10-CM

## 2022-03-16 DIAGNOSIS — Z95.2 PRESENCE OF PROSTHETIC HEART VALVE: Chronic | ICD-10-CM

## 2022-03-16 DIAGNOSIS — Z95.0 PRESENCE OF CARDIAC PACEMAKER: Chronic | ICD-10-CM

## 2022-03-16 LAB
ALBUMIN SERPL ELPH-MCNC: 4.9 G/DL
ALP BLD-CCNC: 59 U/L
ALT SERPL-CCNC: 61 U/L
ANION GAP SERPL CALC-SCNC: 16 MMOL/L
AST SERPL-CCNC: 39 U/L
BILIRUB SERPL-MCNC: 1.6 MG/DL
BUN SERPL-MCNC: 15 MG/DL
CALCIUM SERPL-MCNC: 10.4 MG/DL
CHLORIDE SERPL-SCNC: 101 MMOL/L
CO2 SERPL-SCNC: 28 MMOL/L
CREAT SERPL-MCNC: 0.98 MG/DL
EGFR: 80 ML/MIN/1.73M2
GLUCOSE SERPL-MCNC: 145 MG/DL
POTASSIUM SERPL-SCNC: 4.2 MMOL/L
PROT SERPL-MCNC: 7.5 G/DL
SODIUM SERPL-SCNC: 145 MMOL/L
TESTOST BND SERPL-MCNC: 2 PG/ML
TESTOSTERONE TOTAL S: 281 NG/DL

## 2022-03-16 PROCEDURE — 92012 INTRM OPH EXAM EST PATIENT: CPT

## 2022-03-16 PROCEDURE — 76700 US EXAM ABDOM COMPLETE: CPT | Mod: 26

## 2022-03-16 PROCEDURE — 76700 US EXAM ABDOM COMPLETE: CPT

## 2022-03-17 ENCOUNTER — APPOINTMENT (OUTPATIENT)
Dept: HEART AND VASCULAR | Facility: CLINIC | Age: 77
End: 2022-03-17
Payer: MEDICARE

## 2022-03-17 ENCOUNTER — NON-APPOINTMENT (OUTPATIENT)
Age: 77
End: 2022-03-17

## 2022-03-17 LAB — TESTOST SERPL-MCNC: 575 NG/DL

## 2022-03-17 PROCEDURE — 93306 TTE W/DOPPLER COMPLETE: CPT

## 2022-03-17 PROCEDURE — 93880 EXTRACRANIAL BILAT STUDY: CPT

## 2022-03-17 PROCEDURE — 93018 CV STRESS TEST I&R ONLY: CPT

## 2022-03-17 PROCEDURE — 93925 LOWER EXTREMITY STUDY: CPT

## 2022-03-17 PROCEDURE — 93016 CV STRESS TEST SUPVJ ONLY: CPT

## 2022-03-17 NOTE — PROCEDURE
[No] : not [Sinus Bradycardia] : sinus bradycardia [Pacemaker] : pacemaker [Biot[employer]] : [employer] [Threshold Testing Performed] : Threshold testing was performed [Lead Imp:  ___ohms] : lead impedance was [unfilled] ohms [Sensing Amplitude ___mv] : sensing amplitude was [unfilled] mv [___V @] : [unfilled] V [___ ms] : [unfilled] ms [None] : none [de-identified] : ~ 40 bpm [de-identified] : Icerak [de-identified] : Shemar 8-DRT [de-identified] : [id number] [de-identified] : 8/20/2020 [de-identified] : DDD-CLS [de-identified] :  [de-identified] : 6 years 4 months  [de-identified] : AP 94%\par  0%\par 1 episode of afib with RVR

## 2022-03-17 NOTE — ADDENDUM
[FreeTextEntry1] : I, Franchesca Murcia, hereby attest that the medical record entry for this patient accurately reflects signatures/notations that I made on the Date of Service in my capacity as an Attending Physician when I treated/diagnosed the above patient. I do hereby attest that this information is true, accurate and complete to the best of my knowledge.  I was present for the entire visit and supervised the entire visit and made/agree with the plan as outlined.\par \par

## 2022-03-17 NOTE — REVIEW OF SYSTEMS
[Negative] : Heme/Lymph [Fever] : no fever [Chills] : no chills [Feeling Fatigued] : not feeling fatigued [SOB] : no shortness of breath [Dyspnea on exertion] : not dyspnea during exertion [Chest Discomfort] : no chest discomfort [Palpitations] : no palpitations [Syncope] : no syncope

## 2022-03-17 NOTE — PHYSICAL EXAM
[General Appearance - Well Developed] : well developed [Normal Appearance] : normal appearance [Well Groomed] : well groomed [General Appearance - Well Nourished] : well nourished [No Deformities] : no deformities [General Appearance - In No Acute Distress] : no acute distress [Normal Conjunctiva] : the conjunctiva exhibited no abnormalities [Eyelids - No Xanthelasma] : the eyelids demonstrated no xanthelasmas [Normal Oral Mucosa] : normal oral mucosa [No Oral Pallor] : no oral pallor [No Oral Cyanosis] : no oral cyanosis [Normal Jugular Venous A Waves Present] : normal jugular venous A waves present [Normal Jugular Venous V Waves Present] : normal jugular venous V waves present [No Jugular Venous Barreto A Waves] : no jugular venous barreto A waves [Respiration, Rhythm And Depth] : normal respiratory rhythm and effort [Exaggerated Use Of Accessory Muscles For Inspiration] : no accessory muscle use [Auscultation Breath Sounds / Voice Sounds] : lungs were clear to auscultation bilaterally [Abdomen Soft] : soft [Abdomen Tenderness] : non-tender [Abdomen Mass (___ Cm)] : no abdominal mass palpated [Abnormal Walk] : normal gait [Gait - Sufficient For Exercise Testing] : the gait was sufficient for exercise testing [Nail Clubbing] : no clubbing of the fingernails [Cyanosis, Localized] : no localized cyanosis [Petechial Hemorrhages (___cm)] : no petechial hemorrhages [Skin Color & Pigmentation] : normal skin color and pigmentation [] : no rash [No Venous Stasis] : no venous stasis [Skin Lesions] : no skin lesions [No Skin Ulcers] : no skin ulcer [No Xanthoma] : no  xanthoma was observed [Oriented To Time, Place, And Person] : oriented to person, place, and time [Affect] : the affect was normal [Mood] : the mood was normal [No Anxiety] : not feeling anxious [Bradycardia] : bradycardic [Normal S1] : normal S1 [Normal S2] : normal S2 [II] : a grade 2 [Crescendo-Decrescendo] : crescendo-decrescendo [2+] : left 2+ [No Abnormalities] : the abdominal aorta was not enlarged and no bruit was heard [No Pitting Edema] : no pitting edema present

## 2022-03-17 NOTE — HISTORY OF PRESENT ILLNESS
[FreeTextEntry1] : Mr. 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  No device related complaints.  No palpitations, chest pain, SOB, syncope or near syncope.  Remote monitoring showed 1 episode of afib with RVR he was unaware of.  Lasted less then an hour.  He was started on xarelto which he is taking without issues

## 2022-03-17 NOTE — DISCUSSION/SUMMARY
[FreeTextEntry1] : Mr. 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  His device is functioning appropriately and all measured data is within normal limits.  One episode of atrial fibrillation.  We discussed the normal conduction system and what atrial fibrillation is, the association with stroke and the natural progression of this arrhythmia.  Episodes lasted about 45 minutes and he was unaware of it.  He is on Xarelto and understands the importance of this.  This was his first episode and it was rapid.  I have asked him to start low dose Metoprolol.  We will continue to monitor this remotely.  HE will follow up in 3-4 months or sooner if needed.  He knows to call with any questions or concerns.

## 2022-03-17 NOTE — PHYSICAL EXAM
[Normal Appearance] : normal appearance [General Appearance - In No Acute Distress] : no acute distress [Heart Sounds] : normal S1 and S2 [Respiration, Rhythm And Depth] : normal respiratory rhythm and effort [] : no respiratory distress [Auscultation Breath Sounds / Voice Sounds] : lungs were clear to auscultation bilaterally [Left Infraclavicular] : left infraclavicular area [Clean] : clean [Dry] : dry [Normal Conjunctiva] : the conjunctiva exhibited no abnormalities [Normal Oral Mucosa] : normal oral mucosa [Normal Jugular Venous V Waves Present] : normal jugular venous V waves present [Abnormal Walk] : normal gait [Oriented To Time, Place, And Person] : oriented to person, place, and time [Impaired Insight] : insight and judgment were intact [Affect] : the affect was normal [No Anxiety] : not feeling anxious [General Appearance - Well Developed] : well developed [Well Groomed] : well groomed [General Appearance - Well Nourished] : well nourished [No Deformities] : no deformities [Heart Rate And Rhythm] : heart rate and rhythm were normal [Exaggerated Use Of Accessory Muscles For Inspiration] : no accessory muscle use [Well-Healed] : well-healed [Skin Color & Pigmentation] : normal skin color and pigmentation [Mood] : the mood was normal [Palpable Crepitus] : no palpable crepitus [Bleeding] : no active bleeding [Foul Odor] : no foul smell [Purulent Drainage] : no purulent drainage [Serosanguineous Drainage] : no serosanquineous drainage [Serous Drainage] : no serous drainage [Erythema] : not erythematous [Warm] : not warm [Tender] : not tender [Indurated] : not indurated [Fluctuant] : not fluctuant [FreeTextEntry1] : No LE edema

## 2022-03-19 LAB
CORTICOSTEROID BIND GLOBULIN: 1.7 MG/DL
CORTIS SERPL-MCNC: 13 UG/DL
CORTISOL, FREE: 2.7 UG/DL
PFCX: 21 %

## 2022-03-21 LAB
METANEPHRINE, PL: 20.7 PG/ML
NORMETANEPHRINE, PL: 187.5 PG/ML

## 2022-03-25 ENCOUNTER — APPOINTMENT (OUTPATIENT)
Dept: UROLOGY | Facility: CLINIC | Age: 77
End: 2022-03-25
Payer: MEDICARE

## 2022-03-25 VITALS
TEMPERATURE: 97.6 F | BODY MASS INDEX: 24.29 KG/M2 | HEART RATE: 76 BPM | WEIGHT: 164 LBS | DIASTOLIC BLOOD PRESSURE: 59 MMHG | SYSTOLIC BLOOD PRESSURE: 94 MMHG | HEIGHT: 69 IN

## 2022-03-25 PROCEDURE — 99214 OFFICE O/P EST MOD 30 MIN: CPT

## 2022-03-28 NOTE — ASSESSMENT
[FreeTextEntry1] : Hypogonadism \par  - Mar 16, 2022\par HCT 46,7 %\par PSA 2.81 Nov 2021\par Continue T cypionate 0.3 ml  every week \par ALT 61 - will follow up with liver specialist next month \par Repeat TT with peak, CBC , CMP every 6 months - results over phone \par advised to follow up with PCP for anxiety  medication \par \par BPH \par Continue tamsulosin, increase dose to 0.8 mg at bedtime - refilled \par \par Follow up  yearly

## 2022-03-28 NOTE — HISTORY OF PRESENT ILLNESS
[None] : None [FreeTextEntry1] : 77M with h/o of CAD [(s/p CABGx2 + bioprostetic AVR in 2015), s/p PCI prox/mid LAD and OM1 (2020)], PAD, HTN, Cushing's syndrome (pituitary surgery in May 2021), sick sinus syndrome (s/p Biotronik PPM in 2020), antiphospholipid syndrome, right partial nephrectomy for right benign renal mass (2019)\par returns for follow up for hypogonadism \par  - Mar 16, 2022\par HCT 46,7 %\par LH 8.5\par FSH 18.7\par E2 6 \par ALT 61 \par PSA 2.81 Nov 2021\par On T Cypionate 0.3 ml weekly , diazepam 2 mg daily. Wellbutrin daily, smokes marijuana daily \par Reports low energy, good voiding, good FOS\par Denies dysuria, hematuria, flank pain \par \par Sonogram of abdomen Mar 20, 2022\par hepatomegaly and increase hepatic echogenicity consistent with hepatic steatosis

## 2022-04-07 ENCOUNTER — APPOINTMENT (OUTPATIENT)
Dept: FAMILY MEDICINE | Facility: CLINIC | Age: 77
End: 2022-04-07
Payer: MEDICARE

## 2022-04-07 PROCEDURE — 99214 OFFICE O/P EST MOD 30 MIN: CPT | Mod: 95

## 2022-04-07 RX ORDER — SERTRALINE HYDROCHLORIDE 50 MG/1
50 TABLET, FILM COATED ORAL DAILY
Qty: 90 | Refills: 3 | Status: DISCONTINUED | COMMUNITY
Start: 2021-11-17 | End: 2022-04-07

## 2022-04-07 NOTE — PLAN
[FreeTextEntry1] : depression \par controlled \par discussed risks benefits alternatives side effects expected of meds \par reviewed options \par zoloft stopped\par cont. wellbutrin \par \par anxiety \par will start buspar for anxiety \par discussed risks benefits alternatives side effects expected of meds \par reviewed options \par will follow up in 30 days

## 2022-04-07 NOTE — HISTORY OF PRESENT ILLNESS
[Home] : at home, [unfilled] , at the time of the visit. [Medical Office: ([facility])___] : at the medical office located in  [Verbal consent obtained from patient] : the patient, [unfilled] [FreeTextEntry8] : cc: anxiety \par \par 76 yo m presents to discuss mood. \par Not depressed, content on wellbutrin. \par Now anxious. \par Mentioned uncontrolled anxiety. \par Interested in medication option for anxiety.

## 2022-04-19 ENCOUNTER — APPOINTMENT (OUTPATIENT)
Dept: FAMILY MEDICINE | Facility: CLINIC | Age: 77
End: 2022-04-19
Payer: MEDICARE

## 2022-04-19 DIAGNOSIS — F68.8 OTHER SPECIFIED DISORDERS OF ADULT PERSONALITY AND BEHAVIOR: ICD-10-CM

## 2022-04-19 PROCEDURE — 99214 OFFICE O/P EST MOD 30 MIN: CPT | Mod: 95

## 2022-04-19 NOTE — PLAN
[FreeTextEntry1] : anxiety, depression \par better controlled \par reviewed medication regimen \par will connect patient with specialist \par \par encouraged covid booster as well

## 2022-04-19 NOTE — HISTORY OF PRESENT ILLNESS
[Home] : at home, [unfilled] , at the time of the visit. [Medical Office: ([facility])___] : at the medical office located in  [Verbal consent obtained from patient] : the patient, [unfilled] [FreeTextEntry1] : medication discussion  [de-identified] : 76 yo m has been on wellbutrin for 4 weeks. \par Also on buspar for a few weeks-- recently started 2 in the AM 2 in the PM. \par Now back to 1 pill in the AM and PM. \par Mentioned anxiety is now better. \par Still with irrational feelings when angry, better than in the past. \par Mentioned this is upsetting him. \par Met with a psychiatrist who he did not connect well with. Saw this provider 2.5 times. \par Interested in connecting with someone else. \par \par

## 2022-04-20 ENCOUNTER — RX RENEWAL (OUTPATIENT)
Age: 77
End: 2022-04-20

## 2022-04-21 ENCOUNTER — APPOINTMENT (OUTPATIENT)
Dept: FAMILY MEDICINE | Facility: CLINIC | Age: 77
End: 2022-04-21

## 2022-04-25 ENCOUNTER — NON-APPOINTMENT (OUTPATIENT)
Age: 77
End: 2022-04-25

## 2022-04-29 ENCOUNTER — TRANSCRIPTION ENCOUNTER (OUTPATIENT)
Age: 77
End: 2022-04-29

## 2022-05-02 ENCOUNTER — TRANSCRIPTION ENCOUNTER (OUTPATIENT)
Age: 77
End: 2022-05-02

## 2022-05-05 ENCOUNTER — TRANSCRIPTION ENCOUNTER (OUTPATIENT)
Age: 77
End: 2022-05-05

## 2022-05-12 ENCOUNTER — NON-APPOINTMENT (OUTPATIENT)
Age: 77
End: 2022-05-12

## 2022-05-18 ENCOUNTER — APPOINTMENT (OUTPATIENT)
Dept: FAMILY MEDICINE | Facility: CLINIC | Age: 77
End: 2022-05-18
Payer: MEDICARE

## 2022-05-18 VITALS
BODY MASS INDEX: 23.7 KG/M2 | DIASTOLIC BLOOD PRESSURE: 80 MMHG | OXYGEN SATURATION: 97 % | SYSTOLIC BLOOD PRESSURE: 130 MMHG | WEIGHT: 160 LBS | HEART RATE: 72 BPM | HEIGHT: 69 IN | TEMPERATURE: 97.8 F

## 2022-05-18 PROCEDURE — 99214 OFFICE O/P EST MOD 30 MIN: CPT | Mod: 25

## 2022-05-18 NOTE — HISTORY OF PRESENT ILLNESS
[FreeTextEntry1] : med discussion  [de-identified] : 78 yo m has been on wellbutrin for 8 weeks. \par Also on buspar-- now back to 1 pill in the AM and PM. \par Mentioned anxiety is now better. \par Patience has improved slightly. \par Still with irrational feelings when angry, better than in the past, now only a few times per week. \par Mentioned this is upsetting him. \par Pending visit with a psychiatrist.

## 2022-05-18 NOTE — PLAN
[FreeTextEntry1] : cont meds \par reviewed risks, benefits, side effects, alternatives \par pending visit with psych/ therapy

## 2022-05-25 LAB
APPEARANCE: CLEAR
BACTERIA: NEGATIVE
BILIRUBIN URINE: NEGATIVE
BLOOD URINE: NEGATIVE
COLOR: NORMAL
GLUCOSE QUALITATIVE U: NEGATIVE
HYALINE CASTS: 0 /LPF
KETONES URINE: NEGATIVE
LEUKOCYTE ESTERASE URINE: NEGATIVE
MICROSCOPIC-UA: NORMAL
NITRITE URINE: NEGATIVE
PH URINE: 6
PROTEIN URINE: NEGATIVE
RED BLOOD CELLS URINE: 1 /HPF
SPECIFIC GRAVITY URINE: 1.01
SQUAMOUS EPITHELIAL CELLS: 0 /HPF
UROBILINOGEN URINE: NORMAL
WHITE BLOOD CELLS URINE: 1 /HPF

## 2022-05-26 ENCOUNTER — NON-APPOINTMENT (OUTPATIENT)
Age: 77
End: 2022-05-26

## 2022-05-27 ENCOUNTER — NON-APPOINTMENT (OUTPATIENT)
Age: 77
End: 2022-05-27

## 2022-05-27 ENCOUNTER — TRANSCRIPTION ENCOUNTER (OUTPATIENT)
Age: 77
End: 2022-05-27

## 2022-06-06 PROBLEM — Z86.59 HISTORY OF ANXIETY: Status: RESOLVED | Noted: 2020-05-28 | Resolved: 2022-06-06

## 2022-06-07 ENCOUNTER — APPOINTMENT (OUTPATIENT)
Dept: HEART AND VASCULAR | Facility: CLINIC | Age: 77
End: 2022-06-07

## 2022-06-07 ENCOUNTER — APPOINTMENT (OUTPATIENT)
Dept: HEART AND VASCULAR | Facility: CLINIC | Age: 77
End: 2022-06-07
Payer: MEDICARE

## 2022-06-07 VITALS
BODY MASS INDEX: 25.1 KG/M2 | TEMPERATURE: 97.3 F | HEIGHT: 69 IN | OXYGEN SATURATION: 98 % | HEART RATE: 76 BPM | WEIGHT: 169.44 LBS | DIASTOLIC BLOOD PRESSURE: 59 MMHG | SYSTOLIC BLOOD PRESSURE: 95 MMHG

## 2022-06-07 VITALS — DIASTOLIC BLOOD PRESSURE: 67 MMHG | SYSTOLIC BLOOD PRESSURE: 105 MMHG

## 2022-06-07 DIAGNOSIS — Z86.59 PERSONAL HISTORY OF OTHER MENTAL AND BEHAVIORAL DISORDERS: ICD-10-CM

## 2022-06-07 PROCEDURE — 99214 OFFICE O/P EST MOD 30 MIN: CPT

## 2022-06-07 NOTE — HISTORY OF PRESENT ILLNESS
[FreeTextEntry1] : Mr. Castorena presents for follow up and management of CAD s/p CABG x 2 (SVG->RCA, SVG->LCX) (04/12/15 at time of AVR), s/p PCI prox/mid LAD and OM1 (07/02/20), aortic stenosis s/p bioprosthetic AVR (04/12/15), aortic atherosclerosis, HTN, Cushing's syndrome, PAD, sick sinus syndrome s/p Biotronik PPM (08/20/20), paroxysmal atrial fibrillation, and antiphospholipid syndrome.   He was previously followed by Guillermo Muir MD.  On 04/12/15 he underwent a bioprosthetic AVR (# 25 Medtronic porcine) and CABG x 2 with Russ Lester MD at [facility].  In August, 2019 he had complaints of abdominal pain and underwent a CTA chest/abdomen/pelvis on 08/19/19 which revealed no aortic dissection, + severe aortic and coronary plaque, aortic valve calcifications, a 3.6cm left adrenal mass, and 2cm right renal mass.  He had removal of the right renal mass which was benign.   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  He is following with a neurosurgeon, Carol Flores MD, and a head and neck surgeon, Javier Arguelles MD, at [facility] and had pituitary surgery on 05/16/20.  On 05/26/20 I received a call from a cardiologist at [facility], Angelica Katz MD, informing me of a 16 beat run of NSVT during his post operative course.  The run was asymptomatic. He was initiated on Toprol XL 50mg po QD.  On 06/11/20 had a 3.6 second pause during bowel movement noted on MCT monitor.  We agreed to see a heart rhythm specialist. He was evaluated by Franchesca Murcia MD and was tapered off beta blockers.  On 06/16/20 he underwent a CTCA which revealed SVG-distal RCA patent, SVG-OM occluded, LM normal, LAD prox severe, mid moderate, D1 moderate, D2 mild, LCx mild, OM1 severe, LPL normal, RCA prox severe, mid occluded, and distal with patent graft.  He underwent invasive coronary angiography on 07/02/20 which revealed LM mild, LAD prox and mid 80% (FFR +) underwent PCI, LCx mild, OM1 90% underwent PCI, RCA prox 100%, patent SVG-RCA, occluded SVG-OM1.  The following day he had a groin bleed and a sinus pause overnight but otherwise did well.  On 08/18/20 he underwent bilateral lower extremity arterial sonography which revealed left XAVIER 100% mid, left CFA 20-49% stenosis, and right CFA <20% stenosis.  He underwent implantation of a Bitronik dual chamber pacemaker on 08/20/20.  On 05/06/21 he had an echocardiogram which revealed an EF of 69%, mild LVH, grade I diastolic dysfunction, normally functioning bioprosthetic AVR, and a PPM.   In February, 2022 his device interrogation revealed paroxysmal atrial fibrillation.  Eliquis was prohibitively expensive.  He has complaints of depression and anxiety resulting in low appetite and forgetfulness.  He saw his PMD on 03/09/22, who referred him to a neuropsychiatrist. On 03/17/22 he had an echocardiogram which revealed an EF of 66%, normally functioning bioprosthetic AVR, mild MR, and PPM.  He still gets emotional mood swings but is doing better.  He admits to ongoing anorexia.  He has complaints of occasional chest pressure. \par

## 2022-06-07 NOTE — ASSESSMENT
[FreeTextEntry1] : 1. CAD: s/p CABG x 2 at time of AVR 04/25/15, SVG-LCx, SVG-RCA, Russ Lester MD at [facility]: 07/02/20: PCI prox/mid LAD and OM1: CCS 1\par      - will pursue aggressive medical management\par      - continue ASA 81mg po qd \par      - will send for an exercise stress echocardiogram to rule out inducible ischemia \par \par 2. Aortic stenosis: s/p bioprosthetic AVR, #25 Medtronic porcine, Russ Lester MD at [facility] 04/25/15, s/p echo 03/17/22: EF 66%, normally functioning bioprosthetic AVR, mild MR, PPM: \par      - discussed with patient SBE prophylaxis per AHA guidelines\par      - will follow with serial echocardiograms  \par \par 3. HTN: ? secondary to Cushing's syndrome (cured): BP at ACC/AHA 2017 guideline target\par      - will discontinue amlodipine 10mg po qd secondary to hypotension \par      - continue losartan//25mg po qd\par      - continue Toprol XL 25mg po qd\par \par 4. PAD: s/p 03/17/22: sono: right dSFA 20-49%, right dATA 20-49%, left dATA segmental occlusion.\par      - will pursue aggressive medical management     \par      - will follow with serial bilateral lower extremity arterial sonography\par \par 5. Dyslipidemia: LDL 22 (03/09/22) \par      - continue rosuvastatin 40mg po qd\par      - discussed therapeutic lifestyle changes to promote improved lipid metabolism \par \par 6. 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 Antiphospholipid syndrome: no history of arterial or venous thrombosis, details unclear not anticoagulated for this diagnosis:\par      - follow up with hematologist, Chris Lima MD\par \par 8. Sick sinus syndrome: likely chronotropic incompetence\par     - s/p Biotronik dual chamber PPM with Franchesca Murcia MD on 08/20/20\par     - follow up with device clinic\par \par 9. Carotid atherosclerosis: s/p 03/17/22: sono: b/l prox ICA 20-49% stenosis. \par      - will pursue aggressive medical management\par      - will follow with serial carotid artery sonograms \par \par 10. Depression and anxiety: \par      - continue follow up with psychiatrist\par      - continue Wellbutrin \par \par 11. Atrial fibrillation, paroxysmal:  Eliquis prohibitively expensive\par      - continue systemic anticoagulation with Xarelto 20mg po qd (with largest meal of the day)\par      - discussed with patient avoidance of ASA and NSAIDS as well as need for bleeding surveillance \par      - check periodic lab work (Cr and hemoglobin) \par

## 2022-06-07 NOTE — REASON FOR VISIT
[FreeTextEntry1] : Diagnostic Tests:\par ----------------------------------\par ECG: \par 03/09/22: A-paced, V-sensed, incomplete RBBB. \par 08/02/21: A-paced, V-sensed, incomplete RBBB. \par 04/14/21: A-paced, V-sensed, RSR' V1 without ST segment elevation. \par 08/03/20: sinus rhythm, incomplete RBBB. \par 07/13/20: sinus rhythm, incomplete RBBB. \par 06/16/20: sinus bradycardia at 42 bpm, incomplete RBBB. \par 05/28/20: sinus bradycardia at 53 bpm, incomplete RBBB. \par 03/03/20: marked sinus bradycardia at 49 bpm, incomplete RBBB. \par ----------------------------------\par MR:\par 08/12/19: abdomen: left adrenal nodule 2.7cm, 2cm right renal cell carcinoma, hepatic steatosis. \par ----------------------------------\par CT:\par 06/16/20: CTCA: SVG-distal RCA patent, SVG-OM occluded, LM normal, LAD prox severe, mid moderate, D1 moderate, D2 mild, LCx mild, OM1 severe, LPL normal, RCA prox severe, mid occluded, distal with patent graft.\par 03/10/20: CT abdomen/pelvis: s/p excision right renal mass, cyst lateral to aortic bifurcation, aorta-iliac atherosclerotic calcification. \par 08/09/19: CTA chest/abdomen/pelvis: no aortic dissection, + severe aortic and coronary plaque, aortic valve calcifications, 3.6cm left adrenal mass, 2cm right renal mass. \par ---------------------------------\par Echo:\par 03/17/22: EF 66%, normally functioning bioprosthetic AVR, mild MR, PPM.\par 05/06/21: EF 69%, mild LVH, grade I diastolic dysfunction, normally functioning bioprosthetic AVR, PPM. \par 05/22/20: EF 67%, normally functioning bioprosthetic AVR, mild LVH, dilated LA. 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 \par 01/1019: EF 55%, LVH, dilated LA, bioprosthetic AVR normally functioning, dilated ascending aorta. \par ---------------------------------\par Stress:\par 03/17/22: exercise treadmill ECG: normal ECG post 7.0 METS.  \par 03/09/20: exercise stress echo: EF 63%, mild MR, s/p bioprosthetic AVR, normal wall motion, PA pressures, and ECG post 10.1 METs. \par 07/21/15: ETT: 7 minutes of Wilson, no ischemia. \par ---------------------------------\par Cath:\par 07/02/20: LM mild, LAD prox and mid 80% (FFR +) underwent PCI, LCx mild, OM1 90% underwent PCI, RCA prox 100%, patent SVG-RCA, occluded SVG-OM1.  \par 03/03/15: LM normal, LAD prox 60%, distal 70%, LCX 80%, OM1 80%, RCA prox 60%, AV peak-to-peak 49mmHg, MARYLOU 0.9cm2.\par ---------------------------------\par Monitor:\par 06/04/20 to 06/30/20: Biotel MCT: HR 25/50/120, <1% PVCs, <1% APCS, 18 pauses > 2 seconds, longest > 4 seconds, no AF.\par ---------------------------------\par Lower extremity arteries:\par 03/17/22: sono: right dSFA 20-49%, right dATA 20-49%, left dATA segmental occlusion.\par 05/06/21: sono: right dSFA 20-49%, right pPOP 20-49%, left CFA 20-49%, left XAVIER not occluded. \par 08/18/20: sono: left XAVIER 100% mid, left CFA 20-49% stenosis, right CFA <20% stenosis. \par ----------------------------------\par Carotid arteries:\par 03/17/22: sono: b/l prox ICA 20-49% stenosis. \par 05/06/21: sono: b/l prox ICA 20-49% stenosis.

## 2022-06-07 NOTE — PHYSICAL EXAM
[FreeTextEntry1] : Right thigh healing bruise [S3] : no S3 [S4] : no S4 [Right Carotid Bruit] : no bruit heard over the right carotid [Left Carotid Bruit] : no bruit heard over the left carotid [Left Femoral Bruit] : no bruit heard over the left femoral artery [Right Femoral Bruit] : no bruit heard over the right femoral artery

## 2022-06-20 ENCOUNTER — TRANSCRIPTION ENCOUNTER (OUTPATIENT)
Age: 77
End: 2022-06-20

## 2022-06-20 ENCOUNTER — APPOINTMENT (OUTPATIENT)
Dept: HEART AND VASCULAR | Facility: CLINIC | Age: 77
End: 2022-06-20
Payer: MEDICARE

## 2022-06-20 VITALS
SYSTOLIC BLOOD PRESSURE: 153 MMHG | DIASTOLIC BLOOD PRESSURE: 80 MMHG | WEIGHT: 169 LBS | HEIGHT: 69 IN | HEART RATE: 73 BPM | BODY MASS INDEX: 25.03 KG/M2

## 2022-06-20 PROCEDURE — 93000 ELECTROCARDIOGRAM COMPLETE: CPT

## 2022-06-20 PROCEDURE — 99213 OFFICE O/P EST LOW 20 MIN: CPT | Mod: 25

## 2022-06-20 RX ORDER — KETOCONAZOLE 20 MG/G
2 CREAM TOPICAL
Qty: 30 | Refills: 0 | Status: COMPLETED | COMMUNITY
Start: 2022-03-23

## 2022-06-20 RX ORDER — TAMSULOSIN HYDROCHLORIDE 0.4 MG/1
0.4 CAPSULE ORAL
Qty: 180 | Refills: 0 | Status: COMPLETED | COMMUNITY
Start: 2022-03-25

## 2022-06-20 RX ORDER — DEXAMETHASONE 1 MG/1
1 TABLET ORAL
Qty: 1 | Refills: 0 | Status: COMPLETED | COMMUNITY
Start: 2022-04-06

## 2022-06-20 RX ORDER — AMOXICILLIN 500 MG/1
500 CAPSULE ORAL
Qty: 16 | Refills: 0 | Status: COMPLETED | COMMUNITY
Start: 2022-06-13

## 2022-06-20 RX ORDER — GENTAMICIN SULFATE 1 MG/G
0.1 CREAM TOPICAL
Qty: 30 | Refills: 0 | Status: COMPLETED | COMMUNITY
Start: 2022-03-23

## 2022-06-20 RX ORDER — POTASSIUM CHLORIDE 750 MG/1
10 TABLET, EXTENDED RELEASE ORAL
Qty: 90 | Refills: 0 | Status: COMPLETED | COMMUNITY
Start: 2021-07-21

## 2022-06-20 RX ORDER — SODIUM FLUORIDE 1.1 G/100G
1.1 GEL ORAL
Qty: 112 | Refills: 0 | Status: COMPLETED | COMMUNITY
Start: 2022-04-20

## 2022-06-20 NOTE — HISTORY OF PRESENT ILLNESS
[FreeTextEntry1] : Mr. 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  Participating in physical therapy for knee without limitation.  No device related complaints.  No palpitations, chest pain, SOB, syncope or near syncope.  Tolerating Xarelto without bleeding issues.

## 2022-06-20 NOTE — DISCUSSION/SUMMARY
[FreeTextEntry1] : Mr. 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  His device is functioning appropriately and all measured data is within normal limits.  He has had no new arrhythmia events for review.  Home monitoring was reset in the office.  The patient will reset the communicator at home and we will check for transmission tomorrow.  He will return for follow-up in six months or sooner as needed.  All questions were answered.

## 2022-06-20 NOTE — PHYSICAL EXAM
[General Appearance - Well Developed] : well developed [Normal Appearance] : normal appearance [Well Groomed] : well groomed [General Appearance - Well Nourished] : well nourished [No Deformities] : no deformities [General Appearance - In No Acute Distress] : no acute distress [Heart Rate And Rhythm] : heart rate and rhythm were normal [Heart Sounds] : normal S1 and S2 [] : no respiratory distress [Respiration, Rhythm And Depth] : normal respiratory rhythm and effort [Exaggerated Use Of Accessory Muscles For Inspiration] : no accessory muscle use [Auscultation Breath Sounds / Voice Sounds] : lungs were clear to auscultation bilaterally [Left Infraclavicular] : left infraclavicular area [Clean] : clean [Dry] : dry [Well-Healed] : well-healed [Normal Conjunctiva] : the conjunctiva exhibited no abnormalities [Normal Oral Mucosa] : normal oral mucosa [Normal Jugular Venous V Waves Present] : normal jugular venous V waves present [Abnormal Walk] : normal gait [Skin Color & Pigmentation] : normal skin color and pigmentation [Oriented To Time, Place, And Person] : oriented to person, place, and time [Impaired Insight] : insight and judgment were intact [Affect] : the affect was normal [Mood] : the mood was normal [No Anxiety] : not feeling anxious [Palpable Crepitus] : no palpable crepitus [Bleeding] : no active bleeding [Foul Odor] : no foul smell [Purulent Drainage] : no purulent drainage [Serosanguineous Drainage] : no serosanquineous drainage [Serous Drainage] : no serous drainage [Erythema] : not erythematous [Warm] : not warm [Tender] : not tender [Indurated] : not indurated [Fluctuant] : not fluctuant [FreeTextEntry1] : No LE edema

## 2022-06-20 NOTE — PROCEDURE
[No] : not [Sinus Bradycardia] : sinus bradycardia [Pacemaker] : pacemaker [Biotronik [employer]] : Biotronik [employer] [Threshold Testing Performed] : Threshold testing was performed [Sensing Amplitude ___mv] : sensing amplitude was [unfilled] mv [___ ms] : [unfilled] ms [None] : none [Lead Imp:  ___ohms] : lead impedance was [unfilled] ohms [___V @] : [unfilled] V [de-identified] : ~ 40 bpm [de-identified] : [place] [de-identified] : Shemar 8-DRT [de-identified] : [id number] [de-identified] : 8/20/2020 [de-identified] : DDD-CLS [de-identified] :  [de-identified] : 5 y 11 mo [de-identified] : AP 95%\par  0%\par No new events

## 2022-07-05 ENCOUNTER — APPOINTMENT (OUTPATIENT)
Dept: HEART AND VASCULAR | Facility: CLINIC | Age: 77
End: 2022-07-05

## 2022-07-05 ENCOUNTER — RX RENEWAL (OUTPATIENT)
Age: 77
End: 2022-07-05

## 2022-07-05 VITALS
HEART RATE: 68 BPM | WEIGHT: 163 LBS | SYSTOLIC BLOOD PRESSURE: 102 MMHG | TEMPERATURE: 97.9 F | DIASTOLIC BLOOD PRESSURE: 64 MMHG | HEIGHT: 69 IN | BODY MASS INDEX: 24.14 KG/M2

## 2022-07-05 PROCEDURE — 93280 PM DEVICE PROGR EVAL DUAL: CPT

## 2022-07-05 PROCEDURE — 99213 OFFICE O/P EST LOW 20 MIN: CPT | Mod: 25

## 2022-07-05 NOTE — PROCEDURE
[No] : not [Sinus Bradycardia] : sinus bradycardia [Pacemaker] : pacemaker [B[employer]ik [employer]] : Biot[employer] [Threshold Testing Performed] : Threshold testing was performed [Lead Imp:  ___ohms] : lead impedance was [unfilled] ohms [Sensing Amplitude ___mv] : sensing amplitude was [unfilled] mv [___V @] : [unfilled] V [___ ms] : [unfilled] ms [None] : none [de-identified] : ~ 40 bpm [de-identified] : [employer] [de-identified] : Shmear 8-DRT [de-identified] : [id number] [de-identified] : 8/20/2020 [de-identified] : DDD-CLS [de-identified] :  [de-identified] : 6 y 4 mo [de-identified] : AP 95%\par  0%\par No new events

## 2022-07-05 NOTE — HISTORY OF PRESENT ILLNESS
[FreeTextEntry1] : Mr. 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  Admits to shortness of breath on exertion yesterday when he started going for a walk- lasted for a few hours but he was able to continue his walk.  Participating in physical therapy for knee without limitation.  No device related complaints.  No palpitations, chest pain, SOB, syncope or near syncope.  Tolerating Xarelto without bleeding issues.

## 2022-07-05 NOTE — DISCUSSION/SUMMARY
[FreeTextEntry1] : Mr. 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  His device is functioning appropriately and all measured data is within normal limits.  He has had no new arrhythmia events for review.  Home monitoring was reset in the office and transmission was confirmed.  Advised patient to reset the monitor at home and scheduled transmissions will continue every 3 months.  He will return for follow-up in six months or sooner as needed.  All questions were answered.

## 2022-07-06 ENCOUNTER — TRANSCRIPTION ENCOUNTER (OUTPATIENT)
Age: 77
End: 2022-07-06

## 2022-07-07 ENCOUNTER — TRANSCRIPTION ENCOUNTER (OUTPATIENT)
Age: 77
End: 2022-07-07

## 2022-07-12 ENCOUNTER — APPOINTMENT (OUTPATIENT)
Dept: HEART AND VASCULAR | Facility: CLINIC | Age: 77
End: 2022-07-12

## 2022-07-12 ENCOUNTER — NON-APPOINTMENT (OUTPATIENT)
Age: 77
End: 2022-07-12

## 2022-07-12 PROCEDURE — 93351 STRESS TTE COMPLETE: CPT

## 2022-07-26 ENCOUNTER — TRANSCRIPTION ENCOUNTER (OUTPATIENT)
Age: 77
End: 2022-07-26

## 2022-07-27 ENCOUNTER — TRANSCRIPTION ENCOUNTER (OUTPATIENT)
Age: 77
End: 2022-07-27

## 2022-08-01 ENCOUNTER — TRANSCRIPTION ENCOUNTER (OUTPATIENT)
Age: 77
End: 2022-08-01

## 2022-09-13 LAB
ALBUMIN SERPL ELPH-MCNC: 4.6 G/DL
ALP BLD-CCNC: 58 U/L
ALT SERPL-CCNC: 53 U/L
ANION GAP SERPL CALC-SCNC: 18 MMOL/L
AST SERPL-CCNC: 33 U/L
BASOPHILS # BLD AUTO: 0.08 K/UL
BASOPHILS NFR BLD AUTO: 0.8 %
BILIRUB SERPL-MCNC: 1.5 MG/DL
BUN SERPL-MCNC: 19 MG/DL
CALCIUM SERPL-MCNC: 9.5 MG/DL
CHLORIDE SERPL-SCNC: 102 MMOL/L
CO2 SERPL-SCNC: 25 MMOL/L
CREAT SERPL-MCNC: 1.08 MG/DL
EGFR: 72 ML/MIN/1.73M2
EOSINOPHIL # BLD AUTO: 0.22 K/UL
EOSINOPHIL NFR BLD AUTO: 2.2 %
ESTRADIOL SERPL-MCNC: <5 PG/ML
FSH SERPL-MCNC: 16.8 IU/L
GLUCOSE SERPL-MCNC: 128 MG/DL
HCT VFR BLD CALC: 45 %
HGB BLD-MCNC: 15.4 G/DL
IMM GRANULOCYTES NFR BLD AUTO: 0.5 %
LH SERPL-ACNC: 8.8 IU/L
LYMPHOCYTES # BLD AUTO: 1.26 K/UL
LYMPHOCYTES NFR BLD AUTO: 12.7 %
MAN DIFF?: NORMAL
MCHC RBC-ENTMCNC: 32.2 PG
MCHC RBC-ENTMCNC: 34.2 GM/DL
MCV RBC AUTO: 93.9 FL
MONOCYTES # BLD AUTO: 0.88 K/UL
MONOCYTES NFR BLD AUTO: 8.8 %
NEUTROPHILS # BLD AUTO: 7.47 K/UL
NEUTROPHILS NFR BLD AUTO: 75 %
PLATELET # BLD AUTO: 237 K/UL
POTASSIUM SERPL-SCNC: 4.5 MMOL/L
PROT SERPL-MCNC: 7.1 G/DL
RBC # BLD: 4.79 M/UL
RBC # FLD: 13 %
SODIUM SERPL-SCNC: 145 MMOL/L
WBC # FLD AUTO: 9.96 K/UL

## 2022-09-14 ENCOUNTER — NON-APPOINTMENT (OUTPATIENT)
Age: 77
End: 2022-09-14

## 2022-09-15 ENCOUNTER — NON-APPOINTMENT (OUTPATIENT)
Age: 77
End: 2022-09-15

## 2022-09-19 LAB
TESTOST FREE SERPL-MCNC: 1.7 PG/ML
TESTOST FREE SERPL-MCNC: 4.7 PG/ML
TESTOST SERPL-MCNC: 150 NG/DL
TESTOST SERPL-MCNC: 563 NG/DL

## 2022-09-21 ENCOUNTER — APPOINTMENT (OUTPATIENT)
Dept: HEART AND VASCULAR | Facility: CLINIC | Age: 77
End: 2022-09-21

## 2022-09-21 ENCOUNTER — NON-APPOINTMENT (OUTPATIENT)
Age: 77
End: 2022-09-21

## 2022-09-21 VITALS
HEIGHT: 69 IN | HEART RATE: 69 BPM | SYSTOLIC BLOOD PRESSURE: 134 MMHG | BODY MASS INDEX: 24.73 KG/M2 | WEIGHT: 167 LBS | OXYGEN SATURATION: 97 % | DIASTOLIC BLOOD PRESSURE: 80 MMHG

## 2022-09-21 PROCEDURE — 93000 ELECTROCARDIOGRAM COMPLETE: CPT

## 2022-09-21 PROCEDURE — 99214 OFFICE O/P EST MOD 30 MIN: CPT | Mod: 25

## 2022-09-21 NOTE — REASON FOR VISIT
[FreeTextEntry1] : Diagnostic Tests:\par ----------------------------------\par ECG: \par 09/21/22: A-paced, V-sensed, incomplete RBBB. \par 03/09/22: A-paced, V-sensed, incomplete RBBB. \par 08/02/21: A-paced, V-sensed, incomplete RBBB. \par 04/14/21: A-paced, V-sensed, RSR' V1 without ST segment elevation. \par 08/03/20: sinus rhythm, incomplete RBBB. \par 07/13/20: sinus rhythm, incomplete RBBB. \par 06/16/20: sinus bradycardia at 42 bpm, incomplete RBBB. \par 05/28/20: sinus bradycardia at 53 bpm, incomplete RBBB. \par 03/03/20: marked sinus bradycardia at 49 bpm, incomplete RBBB. \par ----------------------------------\par MR:\par 08/12/19: abdomen: left adrenal nodule 2.7cm, 2cm right renal cell carcinoma, hepatic steatosis. \par ----------------------------------\par CT:\par 06/16/20: CTCA: SVG-distal RCA patent, SVG-OM occluded, LM normal, LAD prox severe, mid moderate, D1 moderate, D2 mild, LCx mild, OM1 severe, LPL normal, RCA prox severe, mid occluded, distal with patent graft.\par 03/10/20: CT abdomen/pelvis: s/p excision right renal mass, cyst lateral to aortic bifurcation, aorta-iliac atherosclerotic calcification. \par 08/09/19: CTA chest/abdomen/pelvis: no aortic dissection, + severe aortic and coronary plaque, aortic valve calcifications, 3.6cm left adrenal mass, 2cm right renal mass. \par ---------------------------------\par Echo:\par 03/17/22: EF 66%, normally functioning bioprosthetic AVR, mild MR, PPM.\par 05/06/21: EF 69%, mild LVH, grade I diastolic dysfunction, normally functioning bioprosthetic AVR, PPM. \par 05/22/20: EF 67%, normally functioning bioprosthetic AVR, mild LVH, dilated LA. 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 \par 01/1019: EF 55%, LVH, dilated LA, bioprosthetic AVR normally functioning, dilated ascending aorta. \par ---------------------------------\par Stress:\par 07/12/22: exercise stress echo: EF 62%, s/p bioprosthetic AVR normally functioning (PV 2.4 m/s, MG 12 mmHg), mild MS (MG 4 mmHg at 74 bpm), PPM leads, normal wall motion, PA pressures, and ECG post 7.9 METs of exercise. \par 03/17/22: exercise treadmill ECG: normal ECG post 7.0 METS.  \par 03/09/20: exercise stress echo: EF 63%, mild MR, s/p bioprosthetic AVR, normal wall motion, PA pressures, and ECG post 10.1 METs. \par 07/21/15: ETT: 7 minutes of Wilson, no ischemia. \par ---------------------------------\par Cath:\par 07/02/20: LM mild, LAD prox and mid 80% (FFR +) underwent PCI, LCx mild, OM1 90% underwent PCI, RCA prox 100%, patent SVG-RCA, occluded SVG-OM1.  \par 03/03/15: LM normal, LAD prox 60%, distal 70%, LCX 80%, OM1 80%, RCA prox 60%, AV peak-to-peak 49mmHg, MARYLOU 0.9cm2.\par ---------------------------------\par Monitor:\par 06/04/20 to 06/30/20: Biotel MCT: HR 25/50/120, <1% PVCs, <1% APCS, 18 pauses > 2 seconds, longest > 4 seconds, no AF.\par ---------------------------------\par Lower extremity arteries:\par 03/17/22: sono: right dSFA 20-49%, right dATA 20-49%, left dATA segmental occlusion.\par 05/06/21: sono: right dSFA 20-49%, right pPOP 20-49%, left CFA 20-49%, left XAVIER not occluded. \par 08/18/20: sono: left XAVIER 100% mid, left CFA 20-49% stenosis, right CFA <20% stenosis. \par ----------------------------------\par Carotid arteries:\par 03/17/22: sono: b/l prox ICA 20-49% stenosis. \par 05/06/21: sono: b/l prox ICA 20-49% stenosis.

## 2022-09-21 NOTE — ASSESSMENT
[FreeTextEntry1] : 1. CAD: s/p CABG x 2 at time of AVR 04/25/15, SVG-LCx, SVG-RCA, Russ Lester MD at [facility]: 07/02/20: PCI prox/mid LAD and OM1: CCS 1\par      - will pursue aggressive medical management\par      - continue ASA 81mg po qd \par \par 2. Aortic stenosis: s/p bioprosthetic AVR, #25 Medtronic porcine, Russ Lester MD at [facility] 04/25/15, s/p echo 03/17/22: EF 66%, normally functioning bioprosthetic AVR, mild MR, PPM: \par      - discussed with patient SBE prophylaxis per AHA guidelines\par      - will follow with serial echocardiograms  \par \par 3. HTN: ? secondary to Cushing's syndrome (cured): BP at ACC/AHA 2017 guideline target\par      - continue losartan//25mg po qd\par      - continue Toprol XL 25mg po qd\par \par 4. PAD: s/p 03/17/22: sono: right dSFA 20-49%, right dATA 20-49%, left dATA segmental occlusion.\par      - will pursue aggressive medical management     \par      - will follow with serial bilateral lower extremity arterial sonography\par \par 5. Dyslipidemia: LDL 22 (03/09/22) \par      - continue rosuvastatin 40mg po qd\par      - discussed therapeutic lifestyle changes to promote improved lipid metabolism \par \par 6. 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 Antiphospholipid syndrome: no history of arterial or venous thrombosis, details unclear not anticoagulated for this diagnosis:\par      - follow up with hematologist, Chris Lima MD\par \par 8. Sick sinus syndrome: likely chronotropic incompetence\par     - s/p Biotronik dual chamber PPM with Franchesca Murcia MD on 08/20/20\par     - follow up with device clinic\par \par 9. Carotid atherosclerosis: s/p 03/17/22: sono: b/l prox ICA 20-49% stenosis. \par      - will pursue aggressive medical management\par      - will follow with serial carotid artery sonograms \par \par 10. Depression and anxiety: \par      - continue follow up with psychiatrist\par      - continue Wellbutrin \par \par 11. Atrial fibrillation, paroxysmal:  Eliquis prohibitively expensive\par      - continue systemic anticoagulation with Xarelto 20mg po qd (with largest meal of the day)\par      - discussed with patient avoidance of ASA and NSAIDS as well as need for bleeding surveillance \par      - check periodic lab work (Cr and hemoglobin) \par \par \par The patient was seen and examined with a cardiology fellow as part of their longitudinal ambulatory cardiology training experience.  Permission was obtained at the time of the visit from the patient for the fellow's participation. \par

## 2022-09-21 NOTE — HISTORY OF PRESENT ILLNESS
[FreeTextEntry1] : Mr. Castorena presents for follow up and management of CAD s/p CABG x 2 (SVG->RCA, SVG->LCX) (04/12/15 at time of AVR), s/p PCI prox/mid LAD and OM1 (07/02/20), aortic stenosis s/p bioprosthetic AVR (04/12/15), aortic atherosclerosis, HTN, Cushing's syndrome, PAD, sick sinus syndrome s/p Biotronik PPM (08/20/20), paroxysmal atrial fibrillation, and antiphospholipid syndrome.   He was previously followed by Guillermo Muir MD.  On 04/12/15 he underwent a bioprosthetic AVR (# 25 Medtronic porcine) and CABG x 2 with Russ Lester MD at [facility].  In August, 2019 he had complaints of abdominal pain and underwent a CTA chest/abdomen/pelvis on 08/19/19 which revealed no aortic dissection, + severe aortic and coronary plaque, aortic valve calcifications, a 3.6cm left adrenal mass, and 2cm right renal mass.  He had removal of the right renal mass which was benign.   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  He is following with a neurosurgeon, Carol Flores MD, and a head and neck surgeon, Javier Arguelles MD, at [facility] and had pituitary surgery on 05/16/20.  On 05/26/20 I received a call from [place] [facility] [place] Angelica Katz [place], informing me of a 16 beat run of NSVT during his post operative course.  The run was asymptomatic. He was initiated on Toprol XL 50mg po QD.  On 06/11/20 had a 3.6 second pause during bowel movement noted on MCT monitor.  We agreed to see a heart rhythm specialist. He was evaluated by Franchesca Murcia MD and was tapered off beta blockers.  On 06/16/20 he underwent a CTCA which revealed SVG-distal RCA patent, SVG-OM occluded, LM normal, LAD prox severe, mid moderate, D1 moderate, D2 mild, LCx mild, OM1 severe, LPL normal, RCA prox severe, mid occluded, and distal with patent graft.  He underwent invasive coronary angiography on 07/02/20 which revealed LM mild, LAD prox and mid 80% (FFR +) underwent PCI, LCx mild, OM1 90% underwent PCI, RCA prox 100%, patent SVG-RCA, occluded SVG-OM1.  The following day he had a groin bleed and a sinus pause overnight but otherwise did well.  On 08/18/20 he underwent bilateral lower extremity arterial sonography which revealed left XAVIER 100% mid, left CFA 20-49% stenosis, and right CFA <20% stenosis.  He underwent implantation of a Bitronik dual chamber pacemaker on 08/20/20.  On 05/06/21 he had an echocardiogram which revealed an EF of 69%, mild LVH, grade I diastolic dysfunction, normally functioning bioprosthetic AVR, and a PPM.   In February, 2022 his device interrogation revealed paroxysmal atrial fibrillation.  Eliquis was prohibitively expensive.  He has complaints of depression and anxiety resulting in low appetite and forgetfulness.  He saw his PMD on 03/09/22, who referred him to a neuropsychiatrist. On 03/17/22 he had an echocardiogram which revealed an EF of 66%, normally functioning bioprosthetic AVR, mild MR, and PPM.  He still gets emotional mood swings but is doing better.  He admits to ongoing anorexia.  He has complaints of occasional chest pressure.  On 07/12/22 he had an exercise stress echocardiogram which revealed an EF of 62%, s/p bioprosthetic AVR normally functioning (PV 2.4 m/s, MG 12 mmHg), mild MS (MG 4 mmHg at 74 bpm), PPM leads, and normal wall motion, PA pressures, and ECG post 7.9 METs of exercise.   At present his depression has improved. \par

## 2022-10-11 ENCOUNTER — RX RENEWAL (OUTPATIENT)
Age: 77
End: 2022-10-11

## 2022-11-28 ENCOUNTER — APPOINTMENT (OUTPATIENT)
Dept: HEART AND VASCULAR | Facility: CLINIC | Age: 77
End: 2022-11-28

## 2022-11-28 VITALS
TEMPERATURE: 97.1 F | HEART RATE: 71 BPM | WEIGHT: 166 LBS | HEIGHT: 69 IN | DIASTOLIC BLOOD PRESSURE: 73 MMHG | BODY MASS INDEX: 24.59 KG/M2 | SYSTOLIC BLOOD PRESSURE: 150 MMHG

## 2022-11-28 PROCEDURE — 93280 PM DEVICE PROGR EVAL DUAL: CPT

## 2022-11-28 RX ORDER — VENLAFAXINE HYDROCHLORIDE 37.5 MG/1
37.5 CAPSULE, EXTENDED RELEASE ORAL
Qty: 30 | Refills: 0 | Status: COMPLETED | COMMUNITY
Start: 2022-08-02

## 2022-11-28 NOTE — PROCEDURE
[No] : not [Pacemaker] : pacemaker [B[employer]] : [employer] [Threshold Testing Performed] : Threshold testing was performed [Lead Imp:  ___ohms] : lead impedance was [unfilled] ohms [Sensing Amplitude ___mv] : sensing amplitude was [unfilled] mv [___V @] : [unfilled] V [___ ms] : [unfilled] ms [None] : none [Sinoatrial Exit Block] : sinoatrial exit block [de-identified] : ~ 40 bpm [de-identified] : [employer] [de-identified] : Shemar 8-DRT [de-identified] : [id number] [de-identified] : 8/20/2020 [de-identified] : DDD-CLS [de-identified] :  [de-identified] : 5 y 5 mo [de-identified] : AP 99%\par  0%\par No new events

## 2022-11-28 NOTE — DISCUSSION/SUMMARY
[FreeTextEntry1] : Mr. 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  Prior interrogation significant for paroxysmal atrial fibrillation.  Advised to continue Xarelto for TE/CVA prophylaxis.  His device is functioning appropriately and all measured data is within normal limits.  He has had no new arrhythmia events for review.   He will return for follow-up in six months or sooner as needed.  All questions were answered.

## 2022-11-28 NOTE — HISTORY OF PRESENT ILLNESS
[FreeTextEntry1] : Mr. 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  No palpitations, chest pain, SOB, syncope or near syncope.  Tolerating Xarelto without bleeding issues.

## 2022-12-01 ENCOUNTER — APPOINTMENT (OUTPATIENT)
Dept: FAMILY MEDICINE | Facility: CLINIC | Age: 77
End: 2022-12-01

## 2022-12-13 ENCOUNTER — APPOINTMENT (OUTPATIENT)
Dept: FAMILY MEDICINE | Facility: CLINIC | Age: 77
End: 2022-12-13

## 2022-12-13 VITALS
BODY MASS INDEX: 26.07 KG/M2 | SYSTOLIC BLOOD PRESSURE: 109 MMHG | HEART RATE: 76 BPM | DIASTOLIC BLOOD PRESSURE: 75 MMHG | TEMPERATURE: 97.3 F | WEIGHT: 176 LBS | HEIGHT: 69 IN | OXYGEN SATURATION: 98 %

## 2022-12-13 PROCEDURE — 99214 OFFICE O/P EST MOD 30 MIN: CPT | Mod: 25

## 2022-12-13 NOTE — HISTORY OF PRESENT ILLNESS
[de-identified] : 78 yo m has been on wellbutrin and on buspar\par Mentioned anxiety is now better. \par Patience has improved slightly. \par Still with irrational feelings when angry, better than in the past.\par Mentioned this is upsetting him. Sometimes forgetting things. \par Has tried cbd gummies for a month, stopped them after no changes. \par Pending visit with a therapist/ psychiatrist. Him and his wife feel like he lost spark and now worries more.  [FreeTextEntry1] : med discussion \par bp check

## 2022-12-13 NOTE — PLAN
[FreeTextEntry1] : anxiety, depression \par cont meds \par reviewed risks, benefits, side effects, alternatives \par pending visit with psych/ therapy \par \par bp \par stable \par cont meds \par \par seafood allergy \par epi pen \par discussed importance of carrying \par

## 2023-01-10 NOTE — ED PROVIDER NOTE - PRINCIPAL DIAGNOSIS
Mail order faxed in request for medication refill. Medication(s) set up as pending orders from medication list.    Mail order request - verified benefits: [facility] [phone] fax  The patient is currently on Neurontin cap 300mg and has been prescribed neurontin cap 100 mg which is a possibel duplication of therapy. Should the patient be on both medications.  Please verify the quantity for sumatriptan 100 mg that is needed for a 90 day supply. Your current directions/quanty exceeds the recommended treatment of no more than 4 headaches/30 days or 36 tablets/90 day supply.          Abdominal pain

## 2023-01-29 NOTE — REVIEW OF SYSTEMS
[Dyspnea on exertion] : dyspnea during exertion [Depression] : depression [Anxiety] : anxiety [Negative] : Heme/Lymph

## 2023-02-01 ENCOUNTER — LABORATORY RESULT (OUTPATIENT)
Age: 78
End: 2023-02-01

## 2023-02-01 ENCOUNTER — APPOINTMENT (OUTPATIENT)
Dept: HEART AND VASCULAR | Facility: CLINIC | Age: 78
End: 2023-02-01
Payer: MEDICARE

## 2023-02-01 VITALS
HEIGHT: 69 IN | DIASTOLIC BLOOD PRESSURE: 87 MMHG | TEMPERATURE: 97.3 F | HEART RATE: 71 BPM | WEIGHT: 173 LBS | SYSTOLIC BLOOD PRESSURE: 128 MMHG | OXYGEN SATURATION: 99 % | BODY MASS INDEX: 25.62 KG/M2

## 2023-02-01 PROCEDURE — 99214 OFFICE O/P EST MOD 30 MIN: CPT

## 2023-02-01 NOTE — REASON FOR VISIT
[Other: ____] : [unfilled] [FreeTextEntry1] : Diagnostic Tests:\par ----------------------------------\par ECG: \par 09/21/22: A-paced, V-sensed, incomplete RBBB. \par 03/09/22: A-paced, V-sensed, incomplete RBBB. \par 08/02/21: A-paced, V-sensed, incomplete RBBB. \par 04/14/21: A-paced, V-sensed, RSR' V1 without ST segment elevation. \par 08/03/20: sinus rhythm, incomplete RBBB. \par 07/13/20: sinus rhythm, incomplete RBBB. \par 06/16/20: sinus bradycardia at 42 bpm, incomplete RBBB. \par 05/28/20: sinus bradycardia at 53 bpm, incomplete RBBB. \par 03/03/20: marked sinus bradycardia at 49 bpm, incomplete RBBB. \par ----------------------------------\par MR:\par 08/12/19: abdomen: left adrenal nodule 2.7cm, 2cm right renal cell carcinoma, hepatic steatosis. \par ----------------------------------\par CT:\par 06/16/20: CTCA: SVG-distal RCA patent, SVG-OM occluded, LM normal, LAD prox severe, mid moderate, D1 moderate, D2 mild, LCx mild, OM1 severe, LPL normal, RCA prox severe, mid occluded, distal with patent graft.\par 03/10/20: CT abdomen/pelvis: s/p excision right renal mass, cyst lateral to aortic bifurcation, aorta-iliac atherosclerotic calcification. \par 08/09/19: CTA chest/abdomen/pelvis: no aortic dissection, + severe aortic and coronary plaque, aortic valve calcifications, 3.6cm left adrenal mass, 2cm right renal mass. \par ---------------------------------\par Echo:\par 03/17/22: EF 66%, normally functioning bioprosthetic AVR, mild MR, PPM.\par 05/06/21: EF 69%, mild LVH, grade I diastolic dysfunction, normally functioning bioprosthetic AVR, PPM. \par 05/22/20: EF 67%, normally functioning bioprosthetic AVR, mild LVH, dilated LA. 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 \par 01/1019: EF 55%, LVH, dilated LA, bioprosthetic AVR normally functioning, dilated ascending aorta. \par ---------------------------------\par Stress:\par 07/12/22: exercise stress echo: EF 62%, s/p bioprosthetic AVR normally functioning (PV 2.4 m/s, MG 12 mmHg), mild MS (MG 4 mmHg at 74 bpm), PPM leads, normal wall motion, PA pressures, and ECG post 7.9 METs of exercise. \par 03/17/22: exercise treadmill ECG: normal ECG post 7.0 METS.  \par 03/09/20: exercise stress echo: EF 63%, mild MR, s/p bioprosthetic AVR, normal wall motion, PA pressures, and ECG post 10.1 METs. \par 07/21/15: ETT: 7 minutes of Wilson, no ischemia. \par ---------------------------------\par Cath:\par 07/02/20: LM mild, LAD prox and mid 80% (FFR +) underwent PCI, LCx mild, OM1 90% underwent PCI, RCA prox 100%, patent SVG-RCA, occluded SVG-OM1.  \par 03/03/15: LM normal, LAD prox 60%, distal 70%, LCX 80%, OM1 80%, RCA prox 60%, AV peak-to-peak 49mmHg, MARYLOU 0.9cm2.\par ---------------------------------\par Monitor:\par 06/04/20 to 06/30/20: Biotel MCT: HR 25/50/120, <1% PVCs, <1% APCS, 18 pauses > 2 seconds, longest > 4 seconds, no AF.\par ---------------------------------\par Lower extremity arteries:\par 03/17/22: sono: right dSFA 20-49%, right dATA 20-49%, left dATA segmental occlusion.\par 05/06/21: sono: right dSFA 20-49%, right pPOP 20-49%, left CFA 20-49%, left XAVIER not occluded. \par 08/18/20: sono: left XAVIER 100% mid, left CFA 20-49% stenosis, right CFA <20% stenosis. \par ----------------------------------\par Carotid arteries:\par 03/17/22: sono: b/l prox ICA 20-49% stenosis. \par 05/06/21: sono: b/l prox ICA 20-49% stenosis.

## 2023-02-01 NOTE — ASSESSMENT
[FreeTextEntry1] : 1. CAD: s/p CABG x 2 at time of AVR 04/25/15, SVG-LCx, SVG-RCA, Russ Lester MD at [facility]: 07/02/20: PCI prox/mid LAD and OM1: CCS 1\par      - will pursue aggressive medical management\par      - continue ASA 81mg po qd \par \par 2. Aortic stenosis: s/p bioprosthetic AVR, #25 Medtronic porcine, Russ Lester MD at [facility] 04/25/15, s/p echo 03/17/22: EF 66%, normally functioning bioprosthetic AVR, mild MR, PPM: \par      - discussed with patient SBE prophylaxis per AHA guidelines\par      - will follow with serial echocardiograms (ordered today)\par \par 3. HTN: ? secondary to Cushing's syndrome (cured): BP at ACC/AHA 2017 guideline target:\par      - continue losartan//25mg po qd\par      - continue Toprol XL 25mg po qd\par \par 4. PAD: s/p 03/17/22: sono: right dSFA 20-49%, right dATA 20-49%, left dATA segmental occlusion.\par      - will pursue aggressive medical management     \par      - will follow with serial bilateral lower extremity arterial sonography (ordered today) \par \par 5. Dyslipidemia: LDL 22 (03/09/22):  \par      - continue rosuvastatin 40mg po qd\par      - discussed therapeutic lifestyle changes to promote improved lipid metabolism \par      - check lab work today\par \par 6. 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 Antiphospholipid syndrome: no history of arterial or venous thrombosis, details unclear not anticoagulated for this diagnosis:\par      - follow up with hematologist, Chris Lima MD\par \par 8. Sick sinus syndrome: likely chronotropic incompetence: \par     - s/p Biotronik dual chamber PPM with Franchesca Murcia MD on 08/20/20\par     - follow up with device clinic\par \par 9. Carotid atherosclerosis: s/p 03/17/22: sono: b/l prox ICA 20-49% stenosis. \par      - will pursue aggressive medical management\par      - will follow with serial carotid artery sonograms (ordered today)\par \par 10. Depression and anxiety: \par      - continue follow up with psychiatrist\par      - continue Wellbutrin \par \par 11. Atrial fibrillation, paroxysmal:  Eliquis prohibitively expensive\par      - continue systemic anticoagulation with Xarelto 20mg po qd (with largest meal of the day)\par      - discussed with patient avoidance of ASA and NSAIDS as well as need for bleeding surveillance \par      - check periodic lab work (Cr and hemoglobin) (ordered today) \par \par \par

## 2023-02-01 NOTE — HISTORY OF PRESENT ILLNESS
[FreeTextEntry1] : Mr. Castorena presents for follow up and management of CAD s/p CABG x 2 (SVG->RCA, SVG->LCX) (04/12/15 at time of AVR), s/p PCI prox/mid LAD and OM1 (07/02/20), aortic stenosis s/p bioprosthetic AVR (04/12/15), aortic atherosclerosis, HTN, Cushing's syndrome, PAD, sick sinus syndrome s/p Biotronik PPM (08/20/20), paroxysmal atrial fibrillation, and antiphospholipid syndrome.   He was previously followed by Guillermo Muir MD.  On 04/12/15 he underwent a bioprosthetic AVR (# 25 Medtronic porcine) and CABG x 2 with Russ eLster MD at [facility].  In August, 2019 he had complaints of abdominal pain and underwent a CTA chest/abdomen/pelvis on 08/19/19 which revealed no aortic dissection, + severe aortic and coronary plaque, aortic valve calcifications, a 3.6cm left adrenal mass, and 2cm right renal mass.  He had removal of the right renal mass which was benign.   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  He is following with a neurosurgeon, Carol Flores MD, and a head and neck surgeon, Javier Arguelles MD, at [facility] and had pituitary surgery on 05/16/20.  On 05/26/20 I received a call [place] [facility] [place] Angelica Katz [place], informing me of a 16 beat run of NSVT during his post operative course.  The run was asymptomatic. He was initiated on Toprol XL 50mg po QD.  On 06/11/20 had a 3.6 second pause during bowel movement noted on MCT monitor.  We agreed to see a heart rhythm specialist. He was evaluated by Franchesca Murcia MD and was tapered off beta blockers.  On 06/16/20 he underwent a CTCA which revealed SVG-distal RCA patent, SVG-OM occluded, LM normal, LAD prox severe, mid moderate, D1 moderate, D2 mild, LCx mild, OM1 severe, LPL normal, RCA prox severe, mid occluded, and distal with patent graft.  He underwent invasive coronary angiography on 07/02/20 which revealed LM mild, LAD prox and mid 80% (FFR +) underwent PCI, LCx mild, OM1 90% underwent PCI, RCA prox 100%, patent SVG-RCA, occluded SVG-OM1.  The following day he had a groin bleed and a sinus pause overnight but otherwise did well.  On 08/18/20 he underwent bilateral lower extremity arterial sonography which revealed left XAVIER 100% mid, left CFA 20-49% stenosis, and right CFA <20% stenosis.  He underwent implantation of a Bitronik dual chamber pacemaker on 08/20/20.  On 05/06/21 he had an echocardiogram which revealed an EF of 69%, mild LVH, grade I diastolic dysfunction, normally functioning bioprosthetic AVR, and a PPM.   In February, 2022 his device interrogation revealed paroxysmal atrial fibrillation.  Eliquis was prohibitively expensive.  He has complaints of depression and anxiety resulting in low appetite and forgetfulness.  He saw his PMD on 03/09/22, who referred him to a neuropsychiatrist. On 03/17/22 he had an echocardiogram which revealed an EF of 66%, normally functioning bioprosthetic AVR, mild MR, and PPM.   On 07/12/22 he had an exercise stress echocardiogram which revealed an EF of 62%, s/p bioprosthetic AVR normally functioning (PV 2.4 m/s, MG 12 mmHg), mild MS (MG 4 mmHg at 74 bpm), PPM leads, and normal wall motion, PA pressures, and ECG post 7.9 METs of exercise.   At present, he denies chest pain, palpitations, or lower extremity edema, and has TEIXEIRA to 2-3 flights of stairs. \par

## 2023-02-02 ENCOUNTER — APPOINTMENT (OUTPATIENT)
Dept: UROLOGY | Facility: CLINIC | Age: 78
End: 2023-02-02
Payer: MEDICARE

## 2023-02-02 VITALS
HEART RATE: 78 BPM | TEMPERATURE: 97 F | HEIGHT: 69 IN | DIASTOLIC BLOOD PRESSURE: 79 MMHG | SYSTOLIC BLOOD PRESSURE: 142 MMHG | WEIGHT: 173 LBS | BODY MASS INDEX: 25.62 KG/M2

## 2023-02-02 LAB
ALBUMIN SERPL ELPH-MCNC: 5 G/DL
ALP BLD-CCNC: 72 U/L
ALT SERPL-CCNC: 22 U/L
ANION GAP SERPL CALC-SCNC: 11 MMOL/L
AST SERPL-CCNC: 20 U/L
BASOPHILS # BLD AUTO: 0.07 K/UL
BASOPHILS NFR BLD AUTO: 1 %
BILIRUB SERPL-MCNC: 2 MG/DL
BUN SERPL-MCNC: 25 MG/DL
CALCIUM SERPL-MCNC: 10.1 MG/DL
CHLORIDE SERPL-SCNC: 102 MMOL/L
CHOLEST SERPL-MCNC: 94 MG/DL
CO2 SERPL-SCNC: 30 MMOL/L
CREAT SERPL-MCNC: 1.31 MG/DL
EGFR: 57 ML/MIN/1.73M2
EOSINOPHIL # BLD AUTO: 0.17 K/UL
EOSINOPHIL NFR BLD AUTO: 2.5 %
ESTIMATED AVERAGE GLUCOSE: 108 MG/DL
GLUCOSE SERPL-MCNC: 92 MG/DL
HBA1C MFR BLD HPLC: 5.4 %
HCT VFR BLD CALC: 48.3 %
HDLC SERPL-MCNC: 39 MG/DL
HGB BLD-MCNC: 15.8 G/DL
IMM GRANULOCYTES NFR BLD AUTO: 0.3 %
LDLC SERPL DIRECT ASSAY-MCNC: 41 MG/DL
LYMPHOCYTES # BLD AUTO: 0.91 K/UL
LYMPHOCYTES NFR BLD AUTO: 13.6 %
MAN DIFF?: NORMAL
MCHC RBC-ENTMCNC: 31.3 PG
MCHC RBC-ENTMCNC: 32.7 GM/DL
MCV RBC AUTO: 95.8 FL
MONOCYTES # BLD AUTO: 0.98 K/UL
MONOCYTES NFR BLD AUTO: 14.7 %
NEUTROPHILS # BLD AUTO: 4.52 K/UL
NEUTROPHILS NFR BLD AUTO: 67.9 %
PLATELET # BLD AUTO: 222 K/UL
POTASSIUM SERPL-SCNC: 4.1 MMOL/L
PROT SERPL-MCNC: 7.6 G/DL
RBC # BLD: 5.04 M/UL
RBC # FLD: 13.4 %
SODIUM SERPL-SCNC: 142 MMOL/L
TRIGL SERPL-MCNC: 75 MG/DL
TSH SERPL-ACNC: 1.89 UIU/ML
WBC # FLD AUTO: 6.67 K/UL

## 2023-02-02 PROCEDURE — 99214 OFFICE O/P EST MOD 30 MIN: CPT

## 2023-02-02 NOTE — ASSESSMENT
[FreeTextEntry1] : UTI?\par UA/UC\par \par BPH\par Restart tamsulosin 0.8\par \par Hypogonadism\par TT FSH LH E2.PSA\par \par ED\par Discussed treatment options, doesn't want to address it right now. \par \par f/u 4 weeks to assess urine symptoms

## 2023-02-02 NOTE — HISTORY OF PRESENT ILLNESS
[FreeTextEntry1] : 76 yo with h/o of CAD [(s/p CABGx2 + bioprostetic AVR in 2015), s/p PCI prox/mid LAD and OM1 (2020)], PAD, HTN, Cushing's syndrome (pituitary surgery in May 2021), sick sinus syndrome (s/p Biotronik PPM in 2020), antiphospholipid syndrome, right partial nephrectomy for right benign renal mass (2019)\par \par Complains of smelly urine for 5 days, now resolved.\par \par Also nocturia x 3-4. Urgency.\par Sometimes weak FOS.\par No hematuria. \par Feels incomplete voiding. \par \par Hypogonadism\par On T Cyprionate 0.3 ml weekly.\par Reports very low sex drive. \par No ED. Reports difficulty maintaining erections. \par Doesn't want PDE5i for now.\par \par Last Hormonal blood (09/2022)\par  \par FSH 16.8\par E2 <5\par LH 8.8\par \par Last HCT 48.3 (02/2023)\par Last PSA 03/2022 2.62\par \par No family history of prostate cancer.

## 2023-02-03 LAB
APPEARANCE: CLEAR
BACTERIA: NEGATIVE
BILIRUBIN URINE: NEGATIVE
BLOOD URINE: NEGATIVE
COLOR: NORMAL
ESTRADIOL SERPL-MCNC: 20 PG/ML
FSH SERPL-MCNC: 15.9 IU/L
GLUCOSE QUALITATIVE U: NEGATIVE
HYALINE CASTS: 0 /LPF
KETONES URINE: NEGATIVE
LEUKOCYTE ESTERASE URINE: NEGATIVE
LH SERPL-ACNC: 8.9 IU/L
MICROSCOPIC-UA: NORMAL
NITRITE URINE: NEGATIVE
PH URINE: 5.5
PROTEIN URINE: NORMAL
PSA SERPL-MCNC: 5.03 NG/ML
RED BLOOD CELLS URINE: 1 /HPF
SPECIFIC GRAVITY URINE: 1.02
SQUAMOUS EPITHELIAL CELLS: 0 /HPF
UROBILINOGEN URINE: NORMAL
WHITE BLOOD CELLS URINE: 3 /HPF

## 2023-02-06 ENCOUNTER — NON-APPOINTMENT (OUTPATIENT)
Age: 78
End: 2023-02-06

## 2023-02-06 DIAGNOSIS — R97.20 ELEVATED PROSTATE, SPECIFIC ANTIGEN [PSA]: ICD-10-CM

## 2023-02-06 LAB — BACTERIA UR CULT: NORMAL

## 2023-02-08 LAB
TESTOST FREE SERPL-MCNC: 4.5 PG/ML
TESTOST SERPL-MCNC: 611 NG/DL

## 2023-03-03 ENCOUNTER — APPOINTMENT (OUTPATIENT)
Dept: UROLOGY | Facility: CLINIC | Age: 78
End: 2023-03-03
Payer: MEDICARE

## 2023-03-03 VITALS — SYSTOLIC BLOOD PRESSURE: 110 MMHG | HEART RATE: 89 BPM | DIASTOLIC BLOOD PRESSURE: 71 MMHG | TEMPERATURE: 97.6 F

## 2023-03-03 PROCEDURE — 99213 OFFICE O/P EST LOW 20 MIN: CPT

## 2023-03-03 NOTE — ASSESSMENT
[FreeTextEntry1] : BPH\par no bother\par remain off meds\par feels well\par repeat PSA\par f/u 6 months

## 2023-03-03 NOTE — HISTORY OF PRESENT ILLNESS
[FreeTextEntry1] : returns for followup\par was given tamsulosin 0.8\par did not take\par no bother now\par PSA 5.03 baseline 2

## 2023-03-04 ENCOUNTER — LABORATORY RESULT (OUTPATIENT)
Age: 78
End: 2023-03-04

## 2023-03-06 ENCOUNTER — NON-APPOINTMENT (OUTPATIENT)
Age: 78
End: 2023-03-06

## 2023-03-10 ENCOUNTER — APPOINTMENT (OUTPATIENT)
Dept: HEART AND VASCULAR | Facility: CLINIC | Age: 78
End: 2023-03-10
Payer: MEDICARE

## 2023-03-10 ENCOUNTER — NON-APPOINTMENT (OUTPATIENT)
Age: 78
End: 2023-03-10

## 2023-03-10 PROCEDURE — 93925 LOWER EXTREMITY STUDY: CPT

## 2023-03-10 PROCEDURE — 93306 TTE W/DOPPLER COMPLETE: CPT

## 2023-03-10 PROCEDURE — 93880 EXTRACRANIAL BILAT STUDY: CPT

## 2023-03-21 ENCOUNTER — NON-APPOINTMENT (OUTPATIENT)
Age: 78
End: 2023-03-21

## 2023-03-22 ENCOUNTER — APPOINTMENT (OUTPATIENT)
Dept: OPHTHALMOLOGY | Facility: CLINIC | Age: 78
End: 2023-03-22
Payer: MEDICARE

## 2023-03-22 ENCOUNTER — NON-APPOINTMENT (OUTPATIENT)
Age: 78
End: 2023-03-22

## 2023-03-22 PROCEDURE — 92014 COMPRE OPH EXAM EST PT 1/>: CPT

## 2023-03-29 ENCOUNTER — APPOINTMENT (OUTPATIENT)
Dept: FAMILY MEDICINE | Facility: CLINIC | Age: 78
End: 2023-03-29
Payer: MEDICARE

## 2023-03-29 ENCOUNTER — APPOINTMENT (OUTPATIENT)
Dept: FAMILY MEDICINE | Facility: CLINIC | Age: 78
End: 2023-03-29

## 2023-03-29 VITALS
WEIGHT: 173 LBS | OXYGEN SATURATION: 98 % | BODY MASS INDEX: 25.62 KG/M2 | DIASTOLIC BLOOD PRESSURE: 86 MMHG | TEMPERATURE: 98 F | SYSTOLIC BLOOD PRESSURE: 131 MMHG | HEART RATE: 89 BPM | HEIGHT: 69 IN | RESPIRATION RATE: 16 BRPM

## 2023-03-29 PROCEDURE — 99214 OFFICE O/P EST MOD 30 MIN: CPT | Mod: 25

## 2023-04-03 DIAGNOSIS — H91.90 UNSPECIFIED HEARING LOSS, UNSPECIFIED EAR: ICD-10-CM

## 2023-04-11 ENCOUNTER — RX RENEWAL (OUTPATIENT)
Age: 78
End: 2023-04-11

## 2023-04-18 RX ORDER — NEEDLES, DISPOSABLE 25GX5/8"
21G X 1-1/2" NEEDLE, DISPOSABLE MISCELLANEOUS
Qty: 30 | Refills: 1 | Status: ACTIVE | COMMUNITY
Start: 2021-06-02 | End: 1900-01-01

## 2023-04-26 ENCOUNTER — APPOINTMENT (OUTPATIENT)
Dept: HEART AND VASCULAR | Facility: CLINIC | Age: 78
End: 2023-04-26
Payer: MEDICARE

## 2023-04-26 ENCOUNTER — NON-APPOINTMENT (OUTPATIENT)
Age: 78
End: 2023-04-26

## 2023-04-26 VITALS
HEART RATE: 79 BPM | OXYGEN SATURATION: 98 % | BODY MASS INDEX: 25.55 KG/M2 | DIASTOLIC BLOOD PRESSURE: 71 MMHG | SYSTOLIC BLOOD PRESSURE: 130 MMHG | WEIGHT: 172.5 LBS | HEIGHT: 69 IN

## 2023-04-26 DIAGNOSIS — Z91.013 ALLERGY TO SEAFOOD: ICD-10-CM

## 2023-04-26 PROCEDURE — 93000 ELECTROCARDIOGRAM COMPLETE: CPT

## 2023-04-26 PROCEDURE — 99214 OFFICE O/P EST MOD 30 MIN: CPT | Mod: 25

## 2023-04-26 NOTE — PHYSICAL EXAM
Problem: Patient Care Overview  Goal: Plan of Care Review  Outcome: Ongoing (interventions implemented as appropriate)  Pt on RA with documented sats.98. Will continue to monitor.       [FreeTextEntry1] : Right thigh healing bruise [S3] : no S3 [S4] : no S4 [Right Carotid Bruit] : no bruit heard over the right carotid [Left Carotid Bruit] : no bruit heard over the left carotid [Right Femoral Bruit] : no bruit heard over the right femoral artery [Left Femoral Bruit] : no bruit heard over the left femoral artery

## 2023-04-26 NOTE — REASON FOR VISIT
[FreeTextEntry1] : Diagnostic Tests:\par ----------------------------------\par ECG: \par 04/26/23: A-paced, V sensed, incomplete RBBB.\par 09/21/22: A-paced, V-sensed, incomplete RBBB. \par 03/09/22: A-paced, V-sensed, incomplete RBBB. \par 08/02/21: A-paced, V-sensed, incomplete RBBB. \par 04/14/21: A-paced, V-sensed, RSR' V1 without ST segment elevation. \par 08/03/20: sinus rhythm, incomplete RBBB. \par 07/13/20: sinus rhythm, incomplete RBBB. \par 06/16/20: sinus bradycardia at 42 bpm, incomplete RBBB. \par 05/28/20: sinus bradycardia at 53 bpm, incomplete RBBB. \par 03/03/20: marked sinus bradycardia at 49 bpm, incomplete RBBB. \par ----------------------------------\par MR:\par 08/12/19: abdomen: left adrenal nodule 2.7cm, 2cm right renal cell carcinoma, hepatic steatosis. \par ----------------------------------\par CT:\par 06/16/20: CTCA: SVG-distal RCA patent, SVG-OM occluded, LM normal, LAD prox severe, mid moderate, D1 moderate, D2 mild, LCx mild, OM1 severe, LPL normal, RCA prox severe, mid occluded, distal with patent graft.\par 03/10/20: CT abdomen/pelvis: s/p excision right renal mass, cyst lateral to aortic bifurcation, aorta-iliac atherosclerotic calcification. \par 08/09/19: CTA chest/abdomen/pelvis: no aortic dissection, + severe aortic and coronary plaque, aortic valve calcifications, 3.6cm left adrenal mass, 2cm right renal mass. \par ---------------------------------\par Echo:\par 03/10/23: EF 60%, s/p 25 mm Medtronic porcine AVR (normally functioning), moderate MAC, mild MS (MG 3.0 mmHg at 75 bpm), mild TR, PPM. \par 03/17/22: EF 66%, normally functioning bioprosthetic AVR, mild MR, PPM.\par 05/06/21: EF 69%, mild LVH, grade I diastolic dysfunction, normally functioning bioprosthetic AVR, PPM. \par 05/22/20: EF 67%, normally functioning bioprosthetic AVR, mild LVH, dilated LA. 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 \par 01/1019: EF 55%, LVH, dilated LA, bioprosthetic AVR normally functioning, dilated ascending aorta. \par ---------------------------------\par Stress:\par 07/12/22: exercise stress echo: EF 62%, s/p bioprosthetic AVR normally functioning (PV 2.4 m/s, MG 12 mmHg), mild MS (MG 4 mmHg at 74 bpm), PPM leads, normal wall motion, PA pressures, and ECG post 7.9 METs of exercise. \par 03/17/22: exercise treadmill ECG: normal ECG post 7.0 METS.  \par 03/09/20: exercise stress echo: EF 63%, mild MR, s/p bioprosthetic AVR, normal wall motion, PA pressures, and ECG post 10.1 METs. \par 07/21/15: ETT: 7 minutes of Wilson, no ischemia. \par ---------------------------------\par Cath:\par 07/02/20: LM mild, LAD prox and mid 80% (FFR +) underwent PCI, LCx mild, OM1 90% underwent PCI, RCA prox 100%, patent SVG-RCA, occluded SVG-OM1.  \par 03/03/15: LM normal, LAD prox 60%, distal 70%, LCX 80%, OM1 80%, RCA prox 60%, AV peak-to-peak 49mmHg, MARYLOU 0.9cm2.\par ---------------------------------\par Monitor:\par 06/04/20 to 06/30/20: [employer] MCT: HR 25/50/120, <1% PVCs, <1% APCS, 18 pauses > 2 seconds, longest > 4 seconds, no AF.\par ---------------------------------\par Lower extremity arteries:\par 03/10/23: sono: right dSFA 20-49%, left CFA 20-49%, left p/dPOP 20-49%. \par 03/17/22: sono: right dSFA 20-49%, right dATA 20-49%, left dATA segmental occlusion.\par 05/06/21: sono: right dSFA 20-49%, right pPOP 20-49%, left CFA 20-49%, left XAVIER not occluded. \par 08/18/20: sono: left XAVIER 100% mid, left CFA 20-49% stenosis, right CFA <20% stenosis. \par ----------------------------------\par Carotid arteries:\par 03/10/23: sono: b/l prox ICA 20-49% stenosis. \par 03/17/22: sono: b/l prox ICA 20-49% stenosis. \par 05/06/21: sono: b/l prox ICA 20-49% stenosis.

## 2023-04-26 NOTE — HISTORY OF PRESENT ILLNESS
[FreeTextEntry1] : Mr. Castorena presents for follow up and management of CAD s/p CABG x 2 (SVG->RCA, SVG->LCX) (04/12/15 at time of AVR), s/p PCI prox/mid LAD and OM1 (07/02/20), aortic stenosis s/p bioprosthetic AVR (04/12/15), aortic atherosclerosis, HTN, Cushing's syndrome, PAD, sick sinus syndrome s/p Biotronik PPM (08/20/20), paroxysmal atrial fibrillation, and antiphospholipid syndrome.  He was previously followed by Guillermo Muir MD.  On 04/12/15 he underwent a bioprosthetic AVR (# 25 Medtronic porcine) and CABG x 2 with Russ Lester MD at [facility].  .   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  He is following with a neurosurgeon, Carol Flores MD, and a head and neck surgeon, Javier Arguelles MD, at [facility] and had pituitary surgery on 05/16/20.  On 05/26/20, I received a call from a cardiologist at [facility] [place], Angelica Katz MD, informing me of a 16 beat run of NSVT during his post operative course.  The run was asymptomatic. He was initiated on Toprol XL 50mg po qd.  On 06/11/20, had a 3.6 second pause during bowel movement noted on MCT monitor.  We agreed to see a heart rhythm specialist. He was evaluated by Franchesca Murcia MD and was tapered off beta blockers.  On 06/16/20 he underwent a CTCA which revealed SVG-distal RCA patent, SVG-OM occluded, LM normal, LAD prox severe, mid moderate, D1 moderate, D2 mild, LCx mild, OM1 severe, LPL normal, RCA prox severe, mid occluded, and distal with patent graft.  He underwent invasive coronary angiography on 07/02/20 which revealed LM mild, LAD prox and mid 80% (FFR +) underwent PCI, LCx mild, OM1 90% underwent PCI, RCA prox 100%, patent SVG-RCA, occluded SVG-OM1.  The following day he had a groin bleed and a sinus pause overnight but otherwise did well.  He underwent implantation of a Bitronik dual chamber pacemaker on 08/20/20.  On 05/06/21 he had an echocardiogram which revealed an EF of 69%, mild LVH, grade I diastolic dysfunction, normally functioning bioprosthetic AVR, and a PPM.   In February, 2022, his device interrogation revealed paroxysmal atrial fibrillation.  On 07/12/22 he had an exercise stress echocardiogram which revealed an EF of 62%, s/p bioprosthetic AVR normally functioning (PV 2.4 m/s, MG 12 mmHg), mild MS (MG 4 mmHg at 74 bpm), PPM leads, and normal wall motion, PA pressures, and ECG post 7.9 METs of exercise.  On 03/10/23, he had an echocardiogram which revealed an EF of 60%, s/p 25 mm Medtronic porcine AVR (normally functioning), moderate MAC, mild MS (MG 3.0 mmHg at 75 bpm), mild TR, and a PPM.  At present, he denies chest pain, palpitations, lower extremity edema, or PND. He has started working out at the gym and does 20 minutes of light cardiovascular exercise 2-3 times a week. \par

## 2023-04-26 NOTE — ASSESSMENT
[FreeTextEntry1] : 1. CAD: s/p CABG x 2 at time of AVR 04/25/15, SVG-LCx, SVG-RCA, Russ Lester MD at [facility]: 07/02/20: PCI prox/mid LAD and OM1: CCS 1\par      - will pursue aggressive medical management\par      - continue ASA 81mg po qd \par \par 2. Aortic stenosis: s/p bioprosthetic AVR, #25 Medtronic porcine, Russ Lester MD at [facility] 04/25/15, s/p echo 03/10/23: EF 60%, s/p 25 mm Medtronic porcine AVR (normally functioning), moderate MAC, mild MS (MG 3.0 mmHg at 75 bpm), mild TR, PPM: \par      - discussed with patient SBE prophylaxis per AHA guidelines\par      - will follow with serial echocardiograms \par \par 3. HTN: ? secondary to Cushing's syndrome (cured): BP at ACC/AHA 2017 guideline target:\par      - continue losartan//25mg po qd\par      - continue Toprol XL 25mg po qd\par \par 4. PAD: s/p 03/10/23: sono: right dSFA 20-49%, left CFA 20-49%, left p/dPOP 20-49%: \par      - will pursue aggressive medical management     \par      - will follow with serial bilateral lower extremity arterial sonography \par \par 5. Dyslipidemia: LDL 40 (02/02/23): \par      - continue rosuvastatin 40mg po qd\par      - discussed therapeutic lifestyle changes to promote improved lipid metabolism \par \par 6. 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 Antiphospholipid syndrome: no history of arterial or venous thrombosis, details unclear, not anticoagulated for this diagnosis:\par      - follow up with hematologist, Chris Lima MD\par \par 8. Sick sinus syndrome: likely chronotropic incompetence: \par     - s/p Biotronik dual chamber PPM with Franchesca Murcia MD on 08/20/20\par     - follow up with device clinic\par \par 9. Carotid atherosclerosis: s/p 03/10/23: sono: b/l prox ICA 20-49% stenosis: \par      - will pursue aggressive medical management\par      - will follow with serial carotid artery sonograms (ordered today)\par \par 10. Depression and anxiety: \par      - continue follow up with psychiatrist\par      - continue Wellbutrin and buspirone\par \par 11. Atrial fibrillation, paroxysmal:  Eliquis prohibitively expensive: \par      - continue systemic anticoagulation with Xarelto 20mg po qd (with largest meal of the day)\par      - discussed with patient avoidance of ASA and NSAIDS as well as need for bleeding surveillance \par      - check periodic lab work (Cr and hemoglobin) \par \par \par The patient was seen and examined with a cardiology fellow as part of their longitudinal ambulatory cardiology training experience.  Permission was obtained at the time of the visit from the patient for the fellow's participation. \par \par \par

## 2023-04-27 NOTE — HISTORY OF PRESENT ILLNESS
[FreeTextEntry1] : med discussion \par  [de-identified] : 79 yo m has been on wellbutrin and on buspar\par Mentioned anxiety is now better. \par Still feeling depressed-- lack of motivation.

## 2023-04-27 NOTE — PLAN
[FreeTextEntry1] : anxiety, depression \par not controlled \par pending conversation with psych/ therapy \par will cont prn valium \par will cont buspar 5 mg bid \par will increase wellbutrin to 300 mg daily \par will follow up in 4-6 weeks, sooner if needed to discuss \par

## 2023-05-02 ENCOUNTER — APPOINTMENT (OUTPATIENT)
Dept: HEART AND VASCULAR | Facility: CLINIC | Age: 78
End: 2023-05-02

## 2023-05-09 ENCOUNTER — APPOINTMENT (OUTPATIENT)
Dept: FAMILY MEDICINE | Facility: CLINIC | Age: 78
End: 2023-05-09
Payer: MEDICARE

## 2023-05-09 VITALS
HEART RATE: 76 BPM | DIASTOLIC BLOOD PRESSURE: 82 MMHG | OXYGEN SATURATION: 96 % | RESPIRATION RATE: 18 BRPM | SYSTOLIC BLOOD PRESSURE: 136 MMHG | TEMPERATURE: 97.6 F | HEIGHT: 69 IN | BODY MASS INDEX: 23.99 KG/M2 | WEIGHT: 162 LBS

## 2023-05-09 DIAGNOSIS — F32.A ANXIETY DISORDER, UNSPECIFIED: ICD-10-CM

## 2023-05-09 DIAGNOSIS — F41.9 ANXIETY DISORDER, UNSPECIFIED: ICD-10-CM

## 2023-05-09 PROCEDURE — 99214 OFFICE O/P EST MOD 30 MIN: CPT | Mod: 25

## 2023-05-09 PROCEDURE — G0444 DEPRESSION SCREEN ANNUAL: CPT | Mod: 59

## 2023-05-22 ENCOUNTER — APPOINTMENT (OUTPATIENT)
Dept: HEART AND VASCULAR | Facility: CLINIC | Age: 78
End: 2023-05-22
Payer: MEDICARE

## 2023-05-22 VITALS
HEIGHT: 69 IN | BODY MASS INDEX: 23.99 KG/M2 | HEART RATE: 67 BPM | DIASTOLIC BLOOD PRESSURE: 65 MMHG | TEMPERATURE: 96 F | SYSTOLIC BLOOD PRESSURE: 123 MMHG | WEIGHT: 162 LBS

## 2023-05-22 PROCEDURE — 93280 PM DEVICE PROGR EVAL DUAL: CPT

## 2023-05-22 NOTE — DISCUSSION/SUMMARY
[FreeTextEntry1] : Mr. Castorena is a 79 y/o M with history of bioprosthetic AVR / CABG x 2 in 2015 at [place] (SVG-RCA and SVG-LCx), aortic atherosclerosis, HTN, right renal mass resection (benign), left adrenal mass, Cushing's disease likely secondary to pituitary tumor s/p surgery at [facility] 5/2020, antiphospholipid syndrome, and sinus pauses now s/p PPM-D (Biotronik) 8/20/2020.  Prior interrogation significant for paroxysmal atrial fibrillation.  Advised to continue Xarelto for TE/CVA prophylaxis.  His device is functioning appropriately and all measured data is within normal limits, although is requiring more atrial pacing, His device was reprogrammed to a lower atrial output with an adequate safety margin to conserve battery life. He has had no new arrhythmia events for review.   He will return for follow-up in six months or sooner as needed.  All questions were answered.

## 2023-05-22 NOTE — PROCEDURE
[No] : not [Sinoatrial Exit Block] : sinoatrial exit block [Pacemaker] : pacemaker [Biotronik [employer]] : Biotronik [employer] [Threshold Testing Performed] : Threshold testing was performed [Lead Imp:  ___ohms] : lead impedance was [unfilled] ohms [Sensing Amplitude ___mv] : sensing amplitude was [unfilled] mv [___V @] : [unfilled] V [___ ms] : [unfilled] ms [None] : none [de-identified] : ~ 40 bpm [de-identified] : Aqdot[employer] [de-identified] : Shemar 8-DRT [de-identified] : [id number] [de-identified] : 8/20/2020 [de-identified] : DDD-CLS [de-identified] :  [de-identified] : 5 y 5 mo [de-identified] : AP 99%\par  0%\par No new events

## 2023-05-22 NOTE — HISTORY OF PRESENT ILLNESS
[FreeTextEntry1] : Mr. Castorena is a 79 y/o M with history of bioprosthetic AVR / CABG x 2 in 2015 at [place] (SVG-RCA and SVG-LCx), aortic atherosclerosis, HTN, right renal mass resection (benign), left adrenal mass, Cushing's disease likely secondary to pituitary tumor s/p surgery at [facility] 5/2020, antiphospholipid syndrome, and sinus pauses now s/p PPM-D (Biotronik) 8/20/2020, who was found to have atrial fibrillation on remote monitoring and presents for follow up.\par \par No device related complaints.  No palpitations, chest pain, SOB, syncope or near syncope.  He is experiencing anhedonia and is actively looking into treatment. Tolerating Xarelto without bleeding issues.

## 2023-05-22 NOTE — REVIEW OF SYSTEMS
[Depression] : depression [Negative] : Heme/Lymph [Fever] : no fever [Chills] : no chills [Feeling Fatigued] : not feeling fatigued [SOB] : no shortness of breath [Dyspnea on exertion] : not dyspnea during exertion [Chest Discomfort] : no chest discomfort [Palpitations] : no palpitations [Syncope] : no syncope

## 2023-05-24 ENCOUNTER — APPOINTMENT (OUTPATIENT)
Dept: FAMILY MEDICINE | Facility: CLINIC | Age: 78
End: 2023-05-24
Payer: MEDICARE

## 2023-05-24 DIAGNOSIS — F32.A DEPRESSION, UNSPECIFIED: ICD-10-CM

## 2023-05-24 PROCEDURE — 99214 OFFICE O/P EST MOD 30 MIN: CPT | Mod: 95

## 2023-05-24 RX ORDER — BUPROPION HYDROCHLORIDE 150 MG/1
150 TABLET, EXTENDED RELEASE ORAL DAILY
Qty: 90 | Refills: 0 | Status: DISCONTINUED | COMMUNITY
Start: 2021-11-29 | End: 2023-05-24

## 2023-05-24 NOTE — HISTORY OF PRESENT ILLNESS
[Home] : at home, [unfilled] , at the time of the visit. [Medical Office: ([facility])___] : at the medical office located in  [Verbal consent obtained from patient] : the patient, [unfilled] [FreeTextEntry1] : discussion on medication  [de-identified] : 79 yo m presents to discuss medications. \par Follow up pending with psychiatry. \par Has been on wellbutrin and buspar. \par Still smoking weed as well. \par Mentioned side effects from wellbutrin-- disinterest, irritability, headaches, bored. \par Would like to stop the medication.

## 2023-05-24 NOTE — PLAN
[FreeTextEntry1] : depression, not controlled \par not interested in cont. with wellbutrin \par reviewed risks, side effects, alternatives, regimen\par will take one pill every other day for 1 week \par pending follow up with psychiatry \par \par anxiety \par will cont. buspar \par follow up pending with psych \par reviewed risks, side effects, alternatives, regimen, benefits \par will discuss regimen with psych\par \par discussed the ? interaction with marijuana \par discussed the ? of med not strong enough vs. side effects, would like to discuss further with psych \par

## 2023-06-16 ENCOUNTER — NON-APPOINTMENT (OUTPATIENT)
Age: 78
End: 2023-06-16

## 2023-06-22 ENCOUNTER — APPOINTMENT (OUTPATIENT)
Dept: MRI IMAGING | Facility: HOSPITAL | Age: 78
End: 2023-06-22

## 2023-06-22 ENCOUNTER — OUTPATIENT (OUTPATIENT)
Dept: OUTPATIENT SERVICES | Facility: HOSPITAL | Age: 78
LOS: 1 days | End: 2023-06-22
Payer: MEDICARE

## 2023-06-22 ENCOUNTER — APPOINTMENT (OUTPATIENT)
Dept: HEART AND VASCULAR | Facility: CLINIC | Age: 78
End: 2023-06-22

## 2023-06-22 DIAGNOSIS — Z98.890 OTHER SPECIFIED POSTPROCEDURAL STATES: Chronic | ICD-10-CM

## 2023-06-22 DIAGNOSIS — Z90.49 ACQUIRED ABSENCE OF OTHER SPECIFIED PARTS OF DIGESTIVE TRACT: Chronic | ICD-10-CM

## 2023-06-22 DIAGNOSIS — Z95.0 PRESENCE OF CARDIAC PACEMAKER: Chronic | ICD-10-CM

## 2023-06-22 DIAGNOSIS — Z95.1 PRESENCE OF AORTOCORONARY BYPASS GRAFT: Chronic | ICD-10-CM

## 2023-06-22 DIAGNOSIS — Z95.2 PRESENCE OF PROSTHETIC HEART VALVE: Chronic | ICD-10-CM

## 2023-06-22 DIAGNOSIS — N28.89 OTHER SPECIFIED DISORDERS OF KIDNEY AND URETER: Chronic | ICD-10-CM

## 2023-06-22 DIAGNOSIS — Z98.61 CORONARY ANGIOPLASTY STATUS: Chronic | ICD-10-CM

## 2023-06-22 PROCEDURE — A9585: CPT

## 2023-06-22 PROCEDURE — 70553 MRI BRAIN STEM W/O & W/DYE: CPT | Mod: 26,MH

## 2023-06-22 PROCEDURE — 70553 MRI BRAIN STEM W/O & W/DYE: CPT | Mod: MH

## 2023-07-12 ENCOUNTER — RX RENEWAL (OUTPATIENT)
Age: 78
End: 2023-07-12

## 2023-07-12 RX ORDER — LOSARTAN POTASSIUM AND HYDROCHLOROTHIAZIDE 25; 100 MG/1; MG/1
100-25 TABLET ORAL
Qty: 90 | Refills: 3 | Status: ACTIVE | COMMUNITY
Start: 2021-11-15 | End: 1900-01-01

## 2023-07-28 ENCOUNTER — NON-APPOINTMENT (OUTPATIENT)
Age: 78
End: 2023-07-28

## 2023-07-28 PROBLEM — F41.9 ANXIETY AND DEPRESSION: Status: ACTIVE | Noted: 2022-03-09

## 2023-07-28 NOTE — HEALTH RISK ASSESSMENT
[Former] : Former [10-14] : 10-14 [> 15 Years] : > 15 Years [I have developed a follow-up plan documented below in the note.] : I have developed a follow-up plan documented below in the note.

## 2023-07-28 NOTE — PLAN
[FreeTextEntry1] : anxiety/ depression \par not controlled \par cont wellbutrin 150 mg oral daily 1 tab \par cont buspar 5 mg bid oral \par pending appt. with therapy and pscyh \par \par cont other meds \par encouraged lifestyle mods-- decreasing/ eliminating marajuana as may be conflicting with treatment \par low fat diet, exercise \par

## 2023-07-28 NOTE — HISTORY OF PRESENT ILLNESS
[FreeTextEntry1] : fernando alvarado  [de-identified] : 79 yo m presents to discuss mood-- depressed, cranky. \par A few times a week still taking valium, also smoking weed. \par Psychiatrist pending later today. Still working with therapy.

## 2023-08-11 RX ORDER — POTASSIUM CHLORIDE 750 MG/1
10 TABLET, FILM COATED, EXTENDED RELEASE ORAL DAILY
Qty: 90 | Refills: 3 | Status: ACTIVE | COMMUNITY
Start: 2022-07-12 | End: 1900-01-01

## 2023-08-21 ENCOUNTER — APPOINTMENT (OUTPATIENT)
Dept: HEART AND VASCULAR | Facility: CLINIC | Age: 78
End: 2023-08-21
Payer: MEDICARE

## 2023-08-21 ENCOUNTER — NON-APPOINTMENT (OUTPATIENT)
Age: 78
End: 2023-08-21

## 2023-08-21 PROCEDURE — 93294 REM INTERROG EVL PM/LDLS PM: CPT

## 2023-08-21 PROCEDURE — 93296 REM INTERROG EVL PM/IDS: CPT

## 2023-08-30 ENCOUNTER — APPOINTMENT (OUTPATIENT)
Dept: HEART AND VASCULAR | Facility: CLINIC | Age: 78
End: 2023-08-30
Payer: MEDICARE

## 2023-08-30 VITALS
WEIGHT: 165 LBS | HEART RATE: 76 BPM | HEIGHT: 69 IN | TEMPERATURE: 97.7 F | DIASTOLIC BLOOD PRESSURE: 77 MMHG | SYSTOLIC BLOOD PRESSURE: 119 MMHG | BODY MASS INDEX: 24.44 KG/M2 | OXYGEN SATURATION: 97 %

## 2023-08-30 PROCEDURE — 99214 OFFICE O/P EST MOD 30 MIN: CPT

## 2023-08-30 NOTE — HISTORY OF PRESENT ILLNESS
[FreeTextEntry1] : Mr. Castorena presents for follow up and management of CAD s/p CABG x 2 (SVG->RCA, SVG->LCX) (04/12/15 at time of AVR), s/p PCI prox/mid LAD and OM1 (07/02/20), aortic stenosis s/p bioprosthetic AVR (04/12/15), aortic atherosclerosis, HTN, Cushing's syndrome, PAD, sick sinus syndrome s/p Biotronik PPM (08/20/20), paroxysmal atrial fibrillation, and antiphospholipid syndrome.  He was previously followed by Guillermo Muir MD.  On 04/12/15 he underwent a bioprosthetic AVR (# 25 Medtronic porcine) and CABG x 2 with Russ Lester MD at [facility].  .   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  He is following with a neurosurgeon, Carol Flores MD, and a head and neck surgeon, Javier Arguelles MD, at [facility] and had pituitary surgery on 05/16/20.  On 05/26/20, I received a call from a cardiologist at [facility], Angelica Katz MD, informing me of a 16 beat run of NSVT during his post operative course.  The run was asymptomatic. He was initiated on Toprol XL 50mg po qd.  On 06/11/20, had a 3.6 second pause during bowel movement noted on MCT monitor.  We agreed to see a heart rhythm specialist. He was evaluated by Franchesca Murcia MD and was tapered off beta blockers.  On 06/16/20 he underwent a CTCA which revealed SVG-distal RCA patent, SVG-OM occluded, LM normal, LAD prox severe, mid moderate, D1 moderate, D2 mild, LCx mild, OM1 severe, LPL normal, RCA prox severe, mid occluded, and distal with patent graft.  He underwent invasive coronary angiography on 07/02/20 which revealed LM mild, LAD prox and mid 80% (FFR +) underwent PCI, LCx mild, OM1 90% underwent PCI, RCA prox 100%, patent SVG-RCA, occluded SVG-OM1.  The following day he had a groin bleed and a sinus pause overnight but otherwise did well.  He underwent implantation of a Bitronik dual chamber pacemaker on 08/20/20.  On 05/06/21 he had an echocardiogram which revealed an EF of 69%, mild LVH, grade I diastolic dysfunction, normally functioning bioprosthetic AVR, and a PPM.   In February, 2022, his device interrogation revealed paroxysmal atrial fibrillation.  On 07/12/22 he had an exercise stress echocardiogram which revealed an EF of 62%, s/p bioprosthetic AVR normally functioning (PV 2.4 m/s, MG 12 mmHg), mild MS (MG 4 mmHg at 74 bpm), PPM leads, and normal wall motion, PA pressures, and ECG post 7.9 METs of exercise.  On 03/10/23, he had an echocardiogram which revealed an EF of 60%, s/p 25 mm Medtronic porcine AVR (normally functioning), moderate MAC, mild MS (MG 3.0 mmHg at 75 bpm), mild TR, and a PPM.  At present, he denies chest pain, palpitations, lower extremity edema, or PND. He has complaints of occasional anorexia likely related to depression.

## 2023-08-30 NOTE — ASSESSMENT
[FreeTextEntry1] : ======================================================================================= 1. CAD: s/p CABG x 2 at time of AVR 04/25/15, SVG-LCx, SVG-RCA, Russ Lester MD at [facility]: 07/02/20: PCI prox/mid LAD and OM1: CCS 1      - will pursue aggressive medical management      - continue ASA 81mg po qd   2. Aortic stenosis: s/p bioprosthetic AVR, #25 Medtronic porcine, Russ Lester MD at [facility] 04/25/15, s/p echo 03/10/23: EF 60%, s/p 25 mm Medtronic porcine AVR (normally functioning), moderate MAC, mild MS (MG 3.0 mmHg at 75 bpm), mild TR, PPM:       - discussed with patient SBE prophylaxis per AHA guidelines      - will follow with serial echocardiograms   3. HTN: ? secondary to Cushing's syndrome (cured): BP at ACC/AHA 2017 guideline target:      - continue losartan//25mg po qd      - continue Toprol XL 25mg po qd  4. PAD: s/p 03/10/23: sono: right dSFA 20-49%, left CFA 20-49%, left p/dPOP 20-49%:       - will pursue aggressive medical management           - will follow with serial bilateral lower extremity arterial sonography   5. Dyslipidemia: LDL 40 (02/02/23):       - continue rosuvastatin 40mg po qd      - discussed therapeutic lifestyle changes to promote improved lipid metabolism       - check lab work today  6. 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 Antiphospholipid syndrome: no history of arterial or venous thrombosis, details unclear, not anticoagulated for this diagnosis:      - follow up with hematologist, Chris Lima MD  8. Sick sinus syndrome: likely chronotropic incompetence:      - s/p Biotronik dual chamber PPM with Franchesca Murcia MD on 08/20/20     - follow up with device clinic  9. Carotid atherosclerosis: s/p 03/10/23: sono: b/l prox ICA 20-49% stenosis:       - will pursue aggressive medical management      - will follow with serial carotid artery sonograms   10. Depression and anxiety:       - continue follow up with psychiatrist      - continue Wellbutrin   11. Atrial fibrillation, paroxysmal:  Eliquis prohibitively expensive:       - continue systemic anticoagulation with Xarelto 20mg po qd (with largest meal of the day)      - discussed with patient avoidance of ASA and NSAIDS as well as need for bleeding surveillance       - check periodic lab work (Cr and hemoglobin)

## 2023-08-30 NOTE — REASON FOR VISIT
[Other: ____] : [unfilled] [FreeTextEntry1] : ======================================================================================= Diagnostic Tests: ---------------------------------- EC23: A-paced, V sensed, incomplete RBBB. 22: A-paced, V-sensed, incomplete RBBB.  22: A-paced, V-sensed, incomplete RBBB.  21: A-paced, V-sensed, incomplete RBBB.  21: A-paced, V-sensed, RSR' V1 without ST segment elevation.  20: sinus rhythm, incomplete RBBB.  20: sinus rhythm, incomplete RBBB.  20: sinus bradycardia at 42 bpm, incomplete RBBB.  20: sinus bradycardia at 53 bpm, incomplete RBBB.  20: marked sinus bradycardia at 49 bpm, incomplete RBBB.  ---------------------------------- MR: 19: abdomen: left adrenal nodule 2.7cm, 2cm right renal cell carcinoma, hepatic steatosis.  ---------------------------------- CT: 20: CTCA: SVG-distal RCA patent, SVG-OM occluded, LM normal, LAD prox severe, mid moderate, D1 moderate, D2 mild, LCx mild, OM1 severe, LPL normal, RCA prox severe, mid occluded, distal with patent graft. 03/10/20: CT abdomen/pelvis: s/p excision right renal mass, cyst lateral to aortic bifurcation, aorta-iliac atherosclerotic calcification.  19: CTA chest/abdomen/pelvis: no aortic dissection, + severe aortic and coronary plaque, aortic valve calcifications, 3.6cm left adrenal mass, 2cm right renal mass.  --------------------------------- Echo: 03/10/23: EF 60%, s/p 25 mm Medtronic porcine AVR (normally functioning), moderate MAC, mild MS (MG 3.0 mmHg at 75 bpm), mild TR, PPM.  22: EF 66%, normally functioning bioprosthetic AVR, mild MR, PPM. 21: EF 69%, mild LVH, grade I diastolic dysfunction, normally functioning bioprosthetic AVR, PPM.  20: EF 67%, normally functioning bioprosthetic AVR, mild LVH, dilated LA.  20: EF 63%, mildly dilated LA, s/p bioprosthetic AVR normally functioning, mild MR, mild MS secondary to MAC, PASP 32mmHg.  1019: EF 55%, LVH, dilated LA, bioprosthetic AVR normally functioning, dilated ascending aorta.  --------------------------------- Stress: 22: exercise stress echo: EF 62%, s/p bioprosthetic AVR normally functioning (PV 2.4 m/s, MG 12 mmHg), mild MS (MG 4 mmHg at 74 bpm), PPM leads, normal wall motion, PA pressures, and ECG post 7.9 METs of exercise.  22: exercise treadmill ECG: normal ECG post 7.0 METS.   20: exercise stress echo: EF 63%, mild MR, s/p bioprosthetic AVR, normal wall motion, PA pressures, and ECG post 10.1 METs.  07/21/15: ETT: 7 minutes of Wilson, no ischemia.  --------------------------------- Cath: 20: LM mild, LAD prox and mid 80% (FFR +) underwent PCI, LCx mild, OM1 90% underwent PCI, RCA prox 100%, patent SVG-RCA, occluded SVG-OM1.   03/03/15: LM normal, LAD prox 60%, distal 70%, LCX 80%, OM1 80%, RCA prox 60%, AV peak-to-peak 49mmHg, MARYLOU 0.9cm2. --------------------------------- Monitor: 20 to 20: [employer] MCT: HR 25/50/120, <1% PVCs, <1% APCS, 18 pauses > 2 seconds, longest > 4 seconds, no AF. --------------------------------- Lower extremity arteries: 03/10/23: sono: right dSFA 20-49%, left CFA 20-49%, left p/dPOP 20-49%.  22: sono: right dSFA 20-49%, right dATA 20-49%, left dATA segmental occlusion. 21: sono: right dSFA 20-49%, right pPOP 20-49%, left CFA 20-49%, left XAVIER not occluded.  20: sono: left XAVIER 100% mid, left CFA 20-49% stenosis, right CFA <20% stenosis.  ---------------------------------- Carotid arteries: 03/10/23: sono: b/l prox ICA 20-49% stenosis.  22: sono: b/l prox ICA 20-49% stenosis.  21: sono: b/l prox ICA 20-49% stenosis.

## 2023-08-31 LAB
ALBUMIN SERPL ELPH-MCNC: 4.8 G/DL
ALP BLD-CCNC: 62 U/L
ALT SERPL-CCNC: 46 U/L
ANION GAP SERPL CALC-SCNC: 12 MMOL/L
AST SERPL-CCNC: 29 U/L
BILIRUB SERPL-MCNC: 1.4 MG/DL
BUN SERPL-MCNC: 23 MG/DL
CALCIUM SERPL-MCNC: 10.3 MG/DL
CHLORIDE SERPL-SCNC: 100 MMOL/L
CHOLEST SERPL-MCNC: 94 MG/DL
CO2 SERPL-SCNC: 29 MMOL/L
CREAT SERPL-MCNC: 1.17 MG/DL
EGFR: 65 ML/MIN/1.73M2
ESTIMATED AVERAGE GLUCOSE: 111 MG/DL
GLUCOSE SERPL-MCNC: 96 MG/DL
HBA1C MFR BLD HPLC: 5.5 %
HDLC SERPL-MCNC: 44 MG/DL
LDLC SERPL DIRECT ASSAY-MCNC: 38 MG/DL
POTASSIUM SERPL-SCNC: 4.3 MMOL/L
PROT SERPL-MCNC: 7.1 G/DL
SODIUM SERPL-SCNC: 141 MMOL/L
TRIGL SERPL-MCNC: 64 MG/DL
TSH SERPL-ACNC: 1.44 UIU/ML

## 2023-09-01 LAB — APO LP(A) SERPL-MCNC: 69.4 NMOL/L

## 2023-09-05 ENCOUNTER — APPOINTMENT (OUTPATIENT)
Dept: UROLOGY | Facility: CLINIC | Age: 78
End: 2023-09-05
Payer: MEDICARE

## 2023-09-05 VITALS
SYSTOLIC BLOOD PRESSURE: 132 MMHG | DIASTOLIC BLOOD PRESSURE: 85 MMHG | HEART RATE: 77 BPM | OXYGEN SATURATION: 98 % | TEMPERATURE: 97.6 F

## 2023-09-05 DIAGNOSIS — E29.1 TESTICULAR HYPOFUNCTION: ICD-10-CM

## 2023-09-05 PROCEDURE — 99214 OFFICE O/P EST MOD 30 MIN: CPT

## 2023-09-05 RX ORDER — NEEDLES, DISPOSABLE 25GX5/8"
21G X 1-1/2" NEEDLE, DISPOSABLE MISCELLANEOUS
Qty: 15 | Refills: 11 | Status: ACTIVE | COMMUNITY
Start: 2023-09-05 | End: 1900-01-01

## 2023-09-05 RX ORDER — SYRINGE WITH NEEDLE, 1 ML 25GX5/8"
18G X 1-1/2" SYRINGE, EMPTY DISPOSABLE MISCELLANEOUS
Qty: 15 | Refills: 11 | Status: ACTIVE | COMMUNITY
Start: 2023-09-05 | End: 1900-01-01

## 2023-09-05 NOTE — ASSESSMENT
[FreeTextEntry1] : 78M here for follow up for hypogonadism and BPH w elevated PSA  hypogonadism - refilled T. cypionate - Labs 6 months - RTC 6 months  BPH with elevated PSA - last PSA 3. 29 March 2023 - no symptom bother - remain off medication

## 2023-09-05 NOTE — PHYSICAL EXAM
[Normal Appearance] : normal appearance [General Appearance - In No Acute Distress] : no acute distress [Abdomen Soft] : soft [Abdomen Tenderness] : non-tender [Abdomen Hernia] : no hernia was discovered [Urethral Meatus] : meatus normal [Penis Abnormality] : normal circumcised penis [Urinary Bladder Findings] : the bladder was normal on palpation [Testes Tenderness] : no tenderness of the testes [Scrotum] : the scrotum was normal [Testes Mass (___cm)] : there were no testicular masses [Heart Rate And Rhythm] : Heart rate and rhythm were normal [Edema] : no peripheral edema [] : no respiratory distress [Respiration, Rhythm And Depth] : normal respiratory rhythm and effort [Exaggerated Use Of Accessory Muscles For Inspiration] : no accessory muscle use [Oriented To Time, Place, And Person] : oriented to person, place, and time [Affect] : the affect was normal [Normal Station and Gait] : the gait and station were normal for the patient's age [FreeTextEntry1] : testicles 14 cc smooth no nodules.

## 2023-09-05 NOTE — HISTORY OF PRESENT ILLNESS
[FreeTextEntry1] : 78M here for follow up for hypogonadism, BPH w elevated PSA  - doing well, no complaints - no BPH medication, nocturia x 4 and mild dysuria, no bother - t cyp 0.3 x 7 days, no complaints, denies mood swings hot flashes chest tenderness SOB - H&H, LFTs, good feb 2023,  - hormones WNL Feb 2023  Denies: hematuria, incontinence, ED, fever, chills, flank pain, hesitancy, urgency

## 2023-09-07 LAB
APPEARANCE: CLEAR
BACTERIA UR CULT: NORMAL
BACTERIA: NEGATIVE /HPF
BILIRUBIN URINE: NEGATIVE
BLOOD URINE: NEGATIVE
CAST: 0 /LPF
COLOR: YELLOW
EPITHELIAL CELLS: 0 /HPF
GLUCOSE QUALITATIVE U: NEGATIVE MG/DL
KETONES URINE: NEGATIVE MG/DL
LEUKOCYTE ESTERASE URINE: NEGATIVE
MICROSCOPIC-UA: NORMAL
NITRITE URINE: NEGATIVE
PH URINE: 5.5
PROTEIN URINE: NEGATIVE MG/DL
RED BLOOD CELLS URINE: 1 /HPF
SPECIFIC GRAVITY URINE: 1.02
UROBILINOGEN URINE: 0.2 MG/DL
WHITE BLOOD CELLS URINE: 0 /HPF

## 2023-09-26 ENCOUNTER — OUTPATIENT (OUTPATIENT)
Dept: OUTPATIENT SERVICES | Facility: HOSPITAL | Age: 78
LOS: 1 days | End: 2023-09-26
Payer: MEDICARE

## 2023-09-26 ENCOUNTER — APPOINTMENT (OUTPATIENT)
Dept: HEART AND VASCULAR | Facility: CLINIC | Age: 78
End: 2023-09-26
Payer: MEDICARE

## 2023-09-26 ENCOUNTER — APPOINTMENT (OUTPATIENT)
Dept: MRI IMAGING | Facility: HOSPITAL | Age: 78
End: 2023-09-26

## 2023-09-26 DIAGNOSIS — Z98.890 OTHER SPECIFIED POSTPROCEDURAL STATES: Chronic | ICD-10-CM

## 2023-09-26 DIAGNOSIS — Z95.1 PRESENCE OF AORTOCORONARY BYPASS GRAFT: Chronic | ICD-10-CM

## 2023-09-26 DIAGNOSIS — N28.89 OTHER SPECIFIED DISORDERS OF KIDNEY AND URETER: Chronic | ICD-10-CM

## 2023-09-26 DIAGNOSIS — Z90.49 ACQUIRED ABSENCE OF OTHER SPECIFIED PARTS OF DIGESTIVE TRACT: Chronic | ICD-10-CM

## 2023-09-26 DIAGNOSIS — Z95.0 PRESENCE OF CARDIAC PACEMAKER: Chronic | ICD-10-CM

## 2023-09-26 DIAGNOSIS — Z98.61 CORONARY ANGIOPLASTY STATUS: Chronic | ICD-10-CM

## 2023-09-26 DIAGNOSIS — Z95.2 PRESENCE OF PROSTHETIC HEART VALVE: Chronic | ICD-10-CM

## 2023-09-26 PROCEDURE — 93280 PM DEVICE PROGR EVAL DUAL: CPT

## 2023-09-26 PROCEDURE — 74183 MRI ABD W/O CNTR FLWD CNTR: CPT | Mod: 26,MH

## 2023-09-26 PROCEDURE — 74183 MRI ABD W/O CNTR FLWD CNTR: CPT | Mod: MH

## 2023-09-26 PROCEDURE — A9585: CPT

## 2023-10-26 NOTE — PHYSICAL EXAM
Physical Therapy Cancellation Note       10/26/23 0951   PT Last Visit   PT Visit Date 10/26/23   Note Type   Note type Cancelled Session   Cancel Reasons Medical status   Additional Comments Pt pending read of duplex. Will f/u as able & appropriate     Mikaela Alvarado [General Appearance - Well Developed] : well developed [Normal Appearance] : normal appearance [Well Groomed] : well groomed [General Appearance - Well Nourished] : well nourished [No Deformities] : no deformities [General Appearance - In No Acute Distress] : no acute distress [Normal Conjunctiva] : the conjunctiva exhibited no abnormalities [Eyelids - No Xanthelasma] : the eyelids demonstrated no xanthelasmas [Normal Oral Mucosa] : normal oral mucosa [No Oral Pallor] : no oral pallor [No Oral Cyanosis] : no oral cyanosis [Normal Jugular Venous A Waves Present] : normal jugular venous A waves present [Normal Jugular Venous V Waves Present] : normal jugular venous V waves present [No Jugular Venous Barreto A Waves] : no jugular venous barreto A waves [Respiration, Rhythm And Depth] : normal respiratory rhythm and effort [Exaggerated Use Of Accessory Muscles For Inspiration] : no accessory muscle use [Auscultation Breath Sounds / Voice Sounds] : lungs were clear to auscultation bilaterally [Abdomen Soft] : soft [Abdomen Tenderness] : non-tender [Abdomen Mass (___ Cm)] : no abdominal mass palpated [Abnormal Walk] : normal gait [Gait - Sufficient For Exercise Testing] : the gait was sufficient for exercise testing [Nail Clubbing] : no clubbing of the fingernails [Cyanosis, Localized] : no localized cyanosis [Petechial Hemorrhages (___cm)] : no petechial hemorrhages [Skin Color & Pigmentation] : normal skin color and pigmentation [] : no rash [No Venous Stasis] : no venous stasis [Skin Lesions] : no skin lesions [No Skin Ulcers] : no skin ulcer [No Xanthoma] : no  xanthoma was observed [Oriented To Time, Place, And Person] : oriented to person, place, and time [Affect] : the affect was normal [Mood] : the mood was normal [No Anxiety] : not feeling anxious [Bradycardia] : bradycardic [Normal S1] : normal S1 [Normal S2] : normal S2 [II] : a grade 2 [Crescendo-Decrescendo] : crescendo-decrescendo [2+] : left 2+ [No Abnormalities] : the abdominal aorta was not enlarged and no bruit was heard [No Pitting Edema] : no pitting edema present [FreeTextEntry1] : Right thigh healing bruise [S3] : no S3 [S4] : no S4 [Right Carotid Bruit] : no bruit heard over the right carotid [Left Carotid Bruit] : no bruit heard over the left carotid [Right Femoral Bruit] : no bruit heard over the right femoral artery [Left Femoral Bruit] : no bruit heard over the left femoral artery

## 2023-11-20 ENCOUNTER — APPOINTMENT (OUTPATIENT)
Dept: HEART AND VASCULAR | Facility: CLINIC | Age: 78
End: 2023-11-20
Payer: MEDICARE

## 2023-11-20 ENCOUNTER — NON-APPOINTMENT (OUTPATIENT)
Age: 78
End: 2023-11-20

## 2023-11-20 VITALS
DIASTOLIC BLOOD PRESSURE: 76 MMHG | HEIGHT: 69 IN | TEMPERATURE: 97.9 F | WEIGHT: 163 LBS | BODY MASS INDEX: 24.14 KG/M2 | SYSTOLIC BLOOD PRESSURE: 130 MMHG | HEART RATE: 69 BPM

## 2023-11-20 PROCEDURE — 99213 OFFICE O/P EST LOW 20 MIN: CPT | Mod: 25

## 2023-11-20 PROCEDURE — 93280 PM DEVICE PROGR EVAL DUAL: CPT

## 2023-11-29 ENCOUNTER — APPOINTMENT (OUTPATIENT)
Dept: HEART AND VASCULAR | Facility: CLINIC | Age: 78
End: 2023-11-29
Payer: MEDICARE

## 2023-11-29 VITALS
SYSTOLIC BLOOD PRESSURE: 111 MMHG | BODY MASS INDEX: 24.14 KG/M2 | HEIGHT: 69 IN | HEART RATE: 71 BPM | DIASTOLIC BLOOD PRESSURE: 64 MMHG | WEIGHT: 163 LBS | OXYGEN SATURATION: 99 %

## 2023-11-29 PROCEDURE — 99215 OFFICE O/P EST HI 40 MIN: CPT

## 2024-01-17 ENCOUNTER — NON-APPOINTMENT (OUTPATIENT)
Age: 79
End: 2024-01-17

## 2024-01-17 ENCOUNTER — TRANSCRIPTION ENCOUNTER (OUTPATIENT)
Age: 79
End: 2024-01-17

## 2024-02-01 ENCOUNTER — RX RENEWAL (OUTPATIENT)
Age: 79
End: 2024-02-01

## 2024-02-01 RX ORDER — ROSUVASTATIN CALCIUM 40 MG/1
40 TABLET, FILM COATED ORAL
Qty: 90 | Refills: 3 | Status: ACTIVE | COMMUNITY
Start: 2021-03-22 | End: 1900-01-01

## 2024-03-18 RX ORDER — TESTOSTERONE CYPIONATE 100 MG/ML
100 INJECTION, SOLUTION INTRAMUSCULAR
Qty: 4 | Refills: 0 | Status: ACTIVE | COMMUNITY
Start: 2021-06-02 | End: 1900-01-01

## 2024-03-27 ENCOUNTER — APPOINTMENT (OUTPATIENT)
Dept: OPHTHALMOLOGY | Facility: CLINIC | Age: 79
End: 2024-03-27
Payer: MEDICARE

## 2024-03-27 ENCOUNTER — NON-APPOINTMENT (OUTPATIENT)
Age: 79
End: 2024-03-27

## 2024-03-27 PROCEDURE — 92014 COMPRE OPH EXAM EST PT 1/>: CPT

## 2024-03-31 NOTE — REVIEW OF SYSTEMS
[Anxiety] : anxiety [Dyspnea on exertion] : dyspnea during exertion [Depression] : depression [Negative] : Heme/Lymph

## 2024-04-02 ENCOUNTER — RX RENEWAL (OUTPATIENT)
Age: 79
End: 2024-04-02

## 2024-04-02 RX ORDER — AMLODIPINE BESYLATE 10 MG/1
10 TABLET ORAL
Qty: 90 | Refills: 3 | Status: ACTIVE | COMMUNITY
Start: 2021-11-21 | End: 1900-01-01

## 2024-04-03 ENCOUNTER — NON-APPOINTMENT (OUTPATIENT)
Age: 79
End: 2024-04-03

## 2024-04-03 ENCOUNTER — RESULT REVIEW (OUTPATIENT)
Age: 79
End: 2024-04-03

## 2024-04-03 ENCOUNTER — OUTPATIENT (OUTPATIENT)
Dept: OUTPATIENT SERVICES | Facility: HOSPITAL | Age: 79
LOS: 1 days | End: 2024-04-03
Payer: MEDICARE

## 2024-04-03 ENCOUNTER — APPOINTMENT (OUTPATIENT)
Dept: HEART AND VASCULAR | Facility: CLINIC | Age: 79
End: 2024-04-03
Payer: MEDICARE

## 2024-04-03 VITALS
HEIGHT: 69 IN | DIASTOLIC BLOOD PRESSURE: 77 MMHG | OXYGEN SATURATION: 98 % | HEART RATE: 70 BPM | BODY MASS INDEX: 24.59 KG/M2 | WEIGHT: 166 LBS | SYSTOLIC BLOOD PRESSURE: 127 MMHG

## 2024-04-03 DIAGNOSIS — Z98.890 OTHER SPECIFIED POSTPROCEDURAL STATES: Chronic | ICD-10-CM

## 2024-04-03 DIAGNOSIS — Z95.2 PRESENCE OF PROSTHETIC HEART VALVE: Chronic | ICD-10-CM

## 2024-04-03 DIAGNOSIS — N28.89 OTHER SPECIFIED DISORDERS OF KIDNEY AND URETER: Chronic | ICD-10-CM

## 2024-04-03 DIAGNOSIS — Z95.1 PRESENCE OF AORTOCORONARY BYPASS GRAFT: Chronic | ICD-10-CM

## 2024-04-03 DIAGNOSIS — Z98.61 CORONARY ANGIOPLASTY STATUS: Chronic | ICD-10-CM

## 2024-04-03 DIAGNOSIS — Z95.2 PRESENCE OF PROSTHETIC HEART VALVE: ICD-10-CM

## 2024-04-03 DIAGNOSIS — Z95.0 PRESENCE OF CARDIAC PACEMAKER: Chronic | ICD-10-CM

## 2024-04-03 DIAGNOSIS — Z90.49 ACQUIRED ABSENCE OF OTHER SPECIFIED PARTS OF DIGESTIVE TRACT: Chronic | ICD-10-CM

## 2024-04-03 PROCEDURE — 93306 TTE W/DOPPLER COMPLETE: CPT

## 2024-04-03 PROCEDURE — 99214 OFFICE O/P EST MOD 30 MIN: CPT

## 2024-04-03 PROCEDURE — G2211 COMPLEX E/M VISIT ADD ON: CPT

## 2024-04-03 PROCEDURE — 93306 TTE W/DOPPLER COMPLETE: CPT | Mod: 26

## 2024-04-03 RX ORDER — BUPROPION HYDROCHLORIDE 150 MG/1
150 TABLET, EXTENDED RELEASE ORAL DAILY
Qty: 90 | Refills: 1 | Status: ACTIVE | COMMUNITY

## 2024-04-03 RX ORDER — EPINEPHRINE 0.3 MG/.3ML
0.3 INJECTION INTRAMUSCULAR
Qty: 1 | Refills: 10 | Status: DISCONTINUED | COMMUNITY
Start: 2022-12-13 | End: 2024-04-03

## 2024-04-03 RX ORDER — TESTOSTERONE UNDECANOATE 250 MG/ML
750 INJECTION INTRAMUSCULAR
Qty: 1 | Refills: 0 | Status: DISCONTINUED | COMMUNITY
Start: 2021-05-12 | End: 2024-04-03

## 2024-04-04 RX ORDER — DIAZEPAM 10 MG/1
10 TABLET ORAL
Qty: 90 | Refills: 0 | Status: ACTIVE | COMMUNITY
Start: 2020-05-28 | End: 1900-01-01

## 2024-04-05 NOTE — PHYSICAL EXAM
[General Appearance - Well Developed] : well developed [Normal Appearance] : normal appearance [Well Groomed] : well groomed [No Deformities] : no deformities [General Appearance - In No Acute Distress] : no acute distress [General Appearance - Well Nourished] : well nourished [Normal Oral Mucosa] : normal oral mucosa [Normal Conjunctiva] : the conjunctiva exhibited no abnormalities [Eyelids - No Xanthelasma] : the eyelids demonstrated no xanthelasmas [No Oral Cyanosis] : no oral cyanosis [No Oral Pallor] : no oral pallor [Normal Jugular Venous A Waves Present] : normal jugular venous A waves present [No Jugular Venous Barreto A Waves] : no jugular venous barreto A waves [Normal Jugular Venous V Waves Present] : normal jugular venous V waves present [Exaggerated Use Of Accessory Muscles For Inspiration] : no accessory muscle use [Respiration, Rhythm And Depth] : normal respiratory rhythm and effort [Auscultation Breath Sounds / Voice Sounds] : lungs were clear to auscultation bilaterally [Abdomen Tenderness] : non-tender [Abdomen Soft] : soft [Abnormal Walk] : normal gait [Abdomen Mass (___ Cm)] : no abdominal mass palpated [Gait - Sufficient For Exercise Testing] : the gait was sufficient for exercise testing [Nail Clubbing] : no clubbing of the fingernails [Petechial Hemorrhages (___cm)] : no petechial hemorrhages [Cyanosis, Localized] : no localized cyanosis [Skin Color & Pigmentation] : normal skin color and pigmentation [] : no rash [No Venous Stasis] : no venous stasis [No Skin Ulcers] : no skin ulcer [Skin Lesions] : no skin lesions [Oriented To Time, Place, And Person] : oriented to person, place, and time [No Xanthoma] : no  xanthoma was observed [Affect] : the affect was normal [Mood] : the mood was normal [No Anxiety] : not feeling anxious [Normal S1] : normal S1 [Bradycardia] : bradycardic [Normal S2] : normal S2 [II] : a grade 2 [Crescendo-Decrescendo] : crescendo-decrescendo [2+] : left 2+ [No Pitting Edema] : no pitting edema present [No Abnormalities] : the abdominal aorta was not enlarged and no bruit was heard [FreeTextEntry1] : Right thigh healing bruise [S3] : no S3 [S4] : no S4 [Right Carotid Bruit] : no bruit heard over the right carotid [Left Carotid Bruit] : no bruit heard over the left carotid [Right Femoral Bruit] : no bruit heard over the right femoral artery [Left Femoral Bruit] : no bruit heard over the left femoral artery

## 2024-04-05 NOTE — ASSESSMENT
[FreeTextEntry1] : ======================================================================================= 1. CAD: s/p CABG x 2 at time of AVR 04/25/15, SVG->LCx, SVG->RCA, Russ Lester MD at [facility]: 07/02/20: PCI prox/mid LAD and OM1: CCS 0:     - will pursue aggressive medical management     - continue ASA 81mg po qd  2. Aortic stenosis: s/p bioprosthetic AVR, #25 Medtronic porcine, Russ Lester MD at [facility] 04/25/15, s/p echo 03/10/23: EF 60%, s/p 25 mm Medtronic porcine AVR (normally functioning), moderate MAC, mild MS (MG 3.0 mmHg at 75 bpm), mild TR, PPM:     - discussed with patient SBE prophylaxis per AHA guidelines     - will follow with serial echocardiograms   3. HTN: ? secondary to Cushing's syndrome (cured): BP at ACC/AHA 2017 guideline target:     - continue losartan//25mg po qd     - continue metoprolol succinate 25mg po qd     - continue amlodipine 10mg po qd   4. PAD: s/p 03/10/23: sono: right dSFA 20-49%, left CFA 20-49%, left p/dPOP 20-49%:     - will pursue aggressive medical management     - will follow with serial bilateral lower extremity arterial sonography  5. Dyslipidemia: LDL 38 (08/30/23):     - continue rosuvastatin 40mg po qd     - discussed therapeutic lifestyle changes to promote improved lipid metabolism  6. 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 Antiphospholipid syndrome: no history of arterial or venous thrombosis, details unclear, not anticoagulated for this diagnosis:     - follow up with hematologist, Chris Lima MD  8. Sick sinus syndrome: likely chronotropic incompetence:     - s/p Biotronik dual chamber PPM with Franchesca Murcia MD on 08/20/20     - follow up with device clinic  9. Carotid atherosclerosis: s/p 03/10/23: sono: b/l prox ICA 20-49% stenosis:     - will pursue aggressive medical management     - will follow with serial carotid artery sonograms  10. 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 Atrial fibrillation, paroxysmal: Eliquis prohibitively expensive:     - continue systemic anticoagulation with Xarelto 20mg po qd (with largest meal of the day)     - discussed with patient avoidance of ASA and NSAIDS as well as need for bleeding surveillance   I spent > 45 minutes of total time on this visit, including time spent face-to-face and non-face-to-face.  During this time, I took a relevant history and examined the patient.  I reviewed relevant portions of the medical record and formulated a differential diagnosis and plan.  I explained the relevant cardiac diagnoses to the patient, as well as the work up and management plan.  I answered all questions related to the patient's cardiac conditions.

## 2024-04-05 NOTE — REASON FOR VISIT
[Other: ____] : [unfilled] [FreeTextEntry1] : ======================================================================================= Diagnostic Tests: -------------------------------------------------------------- EC24: A-paced, V sensed, incomplete RBBB. 23: A-paced, V sensed, incomplete RBBB. 22: A-paced, V-sensed, incomplete RBBB.  22: A-paced, V-sensed, incomplete RBBB.  21: A-paced, V-sensed, incomplete RBBB.  21: A-paced, V-sensed, RSR' V1 without ST segment elevation.  20: sinus rhythm, incomplete RBBB.  20: sinus rhythm, incomplete RBBB.  20: sinus bradycardia at 42 bpm, incomplete RBBB.  20: sinus bradycardia at 53 bpm, incomplete RBBB.  20: marked sinus bradycardia at 49 bpm, incomplete RBBB.  -------------------------------------------------------------- MR: 19: abdomen: left adrenal nodule 2.7cm, 2cm right renal cell carcinoma, hepatic steatosis.  -------------------------------------------------------------- CT: 20: CTCA: SVG-distal RCA patent, SVG-OM occluded, LM normal, LAD prox severe, mid moderate, D1 moderate, D2 mild, LCx mild, OM1 severe, LPL normal, RCA prox severe, mid occluded, distal with patent graft. 03/10/20: CT abdomen/pelvis: s/p excision right renal mass, cyst lateral to aortic bifurcation, aorta-iliac atherosclerotic calcification.  19: CTA chest/abdomen/pelvis: no aortic dissection, + severe aortic and coronary plaque, aortic valve calcifications, 3.6cm left adrenal mass, 2cm right renal mass.  -------------------------------------------------------------- Echo: 24: EF 71%, grade I diastolic dysfunction, s/p 25 mm Medtronic porcine AVR (normally functioning), moderate MAC, mild MS (MG 2.5 mmHg at 79 bpm), mild TR, dilated ascending aorta (4.0 cm, 2.1 cm/m2), PPM. 03/10/23: EF 60%, s/p 25 mm Medtronic porcine AVR (normally functioning), moderate MAC, mild MS (MG 3.0 mmHg at 75 bpm), mild TR, PPM.  22: EF 66%, normally functioning bioprosthetic AVR, mild MR, PPM. 21: EF 69%, mild LVH, grade I diastolic dysfunction, normally functioning bioprosthetic AVR, PPM.  20: EF 67%, normally functioning bioprosthetic AVR, mild LVH, dilated LA.  20: EF 63%, mildly dilated LA, s/p bioprosthetic AVR normally functioning, mild MR, mild MS secondary to MAC, PASP 32mmHg.  1019: EF 55%, LVH, dilated LA, bioprosthetic AVR normally functioning, dilated ascending aorta.  -------------------------------------------------------------- Stress: 22: exercise stress echo: EF 62%, s/p bioprosthetic AVR normally functioning (PV 2.4 m/s, MG 12 mmHg), mild MS (MG 4 mmHg at 74 bpm), PPM leads, normal wall motion, PA pressures, and ECG post 7.9 METs of exercise.  22: exercise treadmill ECG: normal ECG post 7.0 METS.   20: exercise stress echo: EF 63%, mild MR, s/p bioprosthetic AVR, normal wall motion, PA pressures, and ECG post 10.1 METs.  07/21/15: ETT: 7 minutes of Wilson, no ischemia.  -------------------------------------------------------------- Cath: 20: LM mild, LAD prox and mid 80% (FFR +) underwent PCI, LCx mild, OM1 90% underwent PCI, RCA prox 100%, patent SVG-RCA, occluded SVG-OM1.   03/03/15: LM normal, LAD prox 60%, distal 70%, LCX 80%, OM1 80%, RCA prox 60%, AV peak-to-peak 49mmHg, MARYLOU 0.9cm2. -------------------------------------------------------------- Monitor: 20 to 20: [employer] MCT: HR 25/50/120, <1% PVCs, <1% APCS, 18 pauses > 2 seconds, longest > 4 seconds, no AF. -------------------------------------------------------------- Lower extremity arteries: 03/10/23: sono: right dSFA 20-49%, left CFA 20-49%, left p/dPOP 20-49%.  22: sono: right dSFA 20-49%, right dATA 20-49%, left dATA segmental occlusion. 21: sono: right dSFA 20-49%, right pPOP 20-49%, left CFA 20-49%, left XAVIER not occluded.  20: sono: left XAVIER 100% mid, left CFA 20-49% stenosis, right CFA <20% stenosis.  -------------------------------------------------------------- Carotid arteries: 03/10/23: sono: b/l prox ICA 20-49% stenosis.  22: sono: b/l prox ICA 20-49% stenosis.  21: sono: b/l prox ICA 20-49% stenosis.

## 2024-04-05 NOTE — HISTORY OF PRESENT ILLNESS
[FreeTextEntry1] : Mr. Castorena presents for follow up and management of CAD s/p CABG x 2 (SVG->RCA, SVG->LCX) (04/12/15 at time of AVR), s/p PCI prox/mid LAD and OM1 (07/02/20), aortic stenosis s/p bioprosthetic AVR (04/12/15), aortic atherosclerosis, HTN, Cushing's syndrome, PAD, sick sinus syndrome s/p Biotronik PPM (08/20/20), paroxysmal atrial fibrillation, and antiphospholipid syndrome.  He was previously followed by Guillermo Muir MD.  On 04/12/15 he underwent a bioprosthetic AVR (# 25 Medtronic porcine) and CABG x 2 with Russ Lester MD at [facility].  .   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  He is following with a neurosurgeon, Carol Flores MD, and a head and neck surgeon, Javier Arguelles MD, at [facility] and had pituitary surgery on 05/16/20.  On 05/26/20, I received a call from a cardiologist [place] [facility] Angelica Katz MD, informing me of a 16 beat run of NSVT during his post operative course.  The run was asymptomatic. He was initiated on Toprol XL 50mg po qd.  On 06/11/20, had a 3.6 second pause during bowel movement noted on MCT monitor.  We agreed to see a heart rhythm specialist. He was evaluated by Franchesca Murcia MD and was tapered off beta blockers.  On 06/16/20 he underwent a CTCA which revealed SVG-distal RCA patent, SVG-OM occluded, LM normal, LAD prox severe, mid moderate, D1 moderate, D2 mild, LCx mild, OM1 severe, LPL normal, RCA prox severe, mid occluded, and distal with patent graft.  He underwent invasive coronary angiography on 07/02/20 which revealed LM mild, LAD prox and mid 80% (FFR +) underwent PCI, LCx mild, OM1 90% underwent PCI, RCA prox 100%, patent SVG-RCA, occluded SVG-OM1.  The following day he had a groin bleed and a sinus pause overnight but otherwise did well.  He underwent implantation of a Bitronik dual chamber pacemaker on 08/20/20.  On 05/06/21 he had an echocardiogram which revealed an EF of 69%, mild LVH, grade I diastolic dysfunction, normally functioning bioprosthetic AVR, and a PPM.   In February, 2022, his device interrogation revealed paroxysmal atrial fibrillation.  On 07/12/22 he had an exercise stress echocardiogram which revealed an EF of 62%, s/p bioprosthetic AVR normally functioning (PV 2.4 m/s, MG 12 mmHg), mild MS (MG 4 mmHg at 74 bpm), PPM leads, and normal wall motion, PA pressures, and ECG post 7.9 METs of exercise. Today, 04/03/24, he had an echocardiogram which revealed an EF of 71%, grade I diastolic dysfunction, s/p 25 mm Medtronic porcine AVR (normally functioning), moderate MAC, mild MS (MG 2.5 mmHg at 79 bpm), mild TR, dilated ascending aorta (4.0 cm, 2.1 cm/m2), and a PPM. At present, he denies chest pain, palpitations, lower extremity edema, or PND. He has complaints of occasional anorexia likely related to depression.

## 2024-04-16 ENCOUNTER — APPOINTMENT (OUTPATIENT)
Dept: UROLOGY | Facility: CLINIC | Age: 79
End: 2024-04-16
Payer: MEDICARE

## 2024-04-16 VITALS
HEART RATE: 71 BPM | TEMPERATURE: 97.2 F | BODY MASS INDEX: 23.99 KG/M2 | SYSTOLIC BLOOD PRESSURE: 132 MMHG | OXYGEN SATURATION: 97 % | DIASTOLIC BLOOD PRESSURE: 77 MMHG | HEIGHT: 69 IN | WEIGHT: 162 LBS

## 2024-04-16 DIAGNOSIS — R35.1 BENIGN PROSTATIC HYPERPLASIA WITH LOWER URINARY TRACT SYMPMS: ICD-10-CM

## 2024-04-16 DIAGNOSIS — N40.1 BENIGN PROSTATIC HYPERPLASIA WITH LOWER URINARY TRACT SYMPMS: ICD-10-CM

## 2024-04-16 PROCEDURE — 99214 OFFICE O/P EST MOD 30 MIN: CPT

## 2024-04-16 RX ORDER — TAMSULOSIN HYDROCHLORIDE 0.4 MG/1
0.4 CAPSULE ORAL
Qty: 30 | Refills: 11 | Status: ACTIVE | COMMUNITY
Start: 2024-04-16 | End: 1900-01-01

## 2024-04-16 NOTE — ASSESSMENT
[FreeTextEntry1] : BPH  UA UCX trial tamsulosin 0.4  hypogonadism cont T cyp 0.3 TT E2 CBC CMP PSA f/u 6months

## 2024-04-16 NOTE — HISTORY OF PRESENT ILLNESS
[FreeTextEntry1] : on T cyp 60mg q7 days no recent labs due to inject today nocturia x 2-5 per night good FOS no dysuria no hematuria

## 2024-04-17 LAB
BASOPHILS # BLD AUTO: 0.06 K/UL
BASOPHILS NFR BLD AUTO: 0.9 %
EOSINOPHIL # BLD AUTO: 0.17 K/UL
EOSINOPHIL NFR BLD AUTO: 2.4 %
HCT VFR BLD CALC: 40.4 %
HGB BLD-MCNC: 14 G/DL
IMM GRANULOCYTES NFR BLD AUTO: 0.3 %
LYMPHOCYTES # BLD AUTO: 0.92 K/UL
LYMPHOCYTES NFR BLD AUTO: 13.1 %
MAN DIFF?: NORMAL
MCHC RBC-ENTMCNC: 31.1 PG
MCHC RBC-ENTMCNC: 34.7 GM/DL
MCV RBC AUTO: 89.8 FL
MONOCYTES # BLD AUTO: 1.02 K/UL
MONOCYTES NFR BLD AUTO: 14.6 %
NEUTROPHILS # BLD AUTO: 4.82 K/UL
NEUTROPHILS NFR BLD AUTO: 68.7 %
PLATELET # BLD AUTO: 192 K/UL
RBC # BLD: 4.5 M/UL
RBC # FLD: 13.4 %
WBC # FLD AUTO: 7.01 K/UL

## 2024-04-18 LAB
ALBUMIN SERPL ELPH-MCNC: 4.5 G/DL
ALP BLD-CCNC: 59 U/L
ALT SERPL-CCNC: 54 U/L
ANION GAP SERPL CALC-SCNC: 23 MMOL/L
AST SERPL-CCNC: 35 U/L
BILIRUB SERPL-MCNC: 1 MG/DL
BUN SERPL-MCNC: 19 MG/DL
CALCIUM SERPL-MCNC: 9.5 MG/DL
CHLORIDE SERPL-SCNC: 103 MMOL/L
CO2 SERPL-SCNC: 20 MMOL/L
CREAT SERPL-MCNC: 1.11 MG/DL
EGFR: 68 ML/MIN/1.73M2
ESTRADIOL SERPL-MCNC: 10 PG/ML
GLUCOSE SERPL-MCNC: 105 MG/DL
POTASSIUM SERPL-SCNC: 4.2 MMOL/L
PROT SERPL-MCNC: 7 G/DL
PSA SERPL-MCNC: 2.93 NG/ML
SODIUM SERPL-SCNC: 146 MMOL/L
TESTOST FREE SERPL-MCNC: 3.2 PG/ML
TESTOST SERPL-MCNC: 486 NG/DL

## 2024-04-22 ENCOUNTER — NON-APPOINTMENT (OUTPATIENT)
Age: 79
End: 2024-04-22

## 2024-04-23 ENCOUNTER — APPOINTMENT (OUTPATIENT)
Dept: HEART AND VASCULAR | Facility: CLINIC | Age: 79
End: 2024-04-23
Payer: MEDICARE

## 2024-04-23 ENCOUNTER — APPOINTMENT (OUTPATIENT)
Dept: FAMILY MEDICINE | Facility: CLINIC | Age: 79
End: 2024-04-23

## 2024-04-23 PROCEDURE — 93296 REM INTERROG EVL PM/IDS: CPT

## 2024-04-23 PROCEDURE — 93294 REM INTERROG EVL PM/LDLS PM: CPT

## 2024-04-25 ENCOUNTER — LABORATORY RESULT (OUTPATIENT)
Age: 79
End: 2024-04-25

## 2024-04-25 ENCOUNTER — APPOINTMENT (OUTPATIENT)
Dept: INTERNAL MEDICINE | Facility: CLINIC | Age: 79
End: 2024-04-25
Payer: MEDICARE

## 2024-04-25 VITALS
WEIGHT: 160 LBS | HEART RATE: 74 BPM | TEMPERATURE: 97.6 F | BODY MASS INDEX: 23.7 KG/M2 | OXYGEN SATURATION: 98 % | DIASTOLIC BLOOD PRESSURE: 83 MMHG | SYSTOLIC BLOOD PRESSURE: 130 MMHG | HEIGHT: 69 IN

## 2024-04-25 DIAGNOSIS — R11.2 NAUSEA WITH VOMITING, UNSPECIFIED: ICD-10-CM

## 2024-04-25 DIAGNOSIS — R19.7 NAUSEA WITH VOMITING, UNSPECIFIED: ICD-10-CM

## 2024-04-25 DIAGNOSIS — R68.89 OTHER GENERAL SYMPTOMS AND SIGNS: ICD-10-CM

## 2024-04-25 PROCEDURE — 99214 OFFICE O/P EST MOD 30 MIN: CPT

## 2024-04-25 PROCEDURE — 87338 HPYLORI STOOL AG IA: CPT | Mod: QW

## 2024-04-25 PROCEDURE — G2211 COMPLEX E/M VISIT ADD ON: CPT

## 2024-04-29 ENCOUNTER — RX RENEWAL (OUTPATIENT)
Age: 79
End: 2024-04-29

## 2024-04-29 PROBLEM — R11.2 NAUSEA, VOMITING, AND DIARRHEA: Status: ACTIVE | Noted: 2024-04-25

## 2024-04-29 PROBLEM — R68.89 FORGETFULNESS: Status: ACTIVE | Noted: 2024-04-25

## 2024-04-29 RX ORDER — METOPROLOL SUCCINATE 25 MG/1
25 TABLET, EXTENDED RELEASE ORAL
Qty: 90 | Refills: 3 | Status: ACTIVE | COMMUNITY
Start: 2022-03-14 | End: 1900-01-01

## 2024-04-29 NOTE — HISTORY OF PRESENT ILLNESS
[Spouse] : spouse [FreeTextEntry1] : vomiting, diarrhea, confusion [de-identified] : On Tuesday morning pt was vomiting and had diarrhea and his wife states he suddenly became confused. The confusion only lasted for 1 day and went away the following day when the nausea, vomiting, and diarrhea stopped. Pt thinks he had food poisoning from what he ate on monday night. Pt denies UTI symptoms, abdominal pain, dysuria, hematuria, melena, constipation, chest pain, sob, palpitations, headaches, sore throat, cough, fever, chills, rash, weight loss, headache, visual changes, malaise, weakness, muscle pains, joint pains. No recent travel, exposure to sick contacts, or antibiotic use. Reports the symptoms have stopped and he's feeling much better but he's wondering what could have caused the confusion. Under the care of neurologist- Dr. Corazon Coelho at [facility] and is getting worked up for dementia/alzheimer's since he has been forgetful at times per his wife who is with him.

## 2024-04-29 NOTE — PHYSICAL EXAM
[No Acute Distress] : no acute distress [Well Nourished] : well nourished [Well Developed] : well developed [Well-Appearing] : well-appearing [Normal Sclera/Conjunctiva] : normal sclera/conjunctiva [PERRL] : pupils equal round and reactive to light [EOMI] : extraocular movements intact [Normal Outer Ear/Nose] : the outer ears and nose were normal in appearance [Normal Oropharynx] : the oropharynx was normal [Normal TMs] : both tympanic membranes were normal [No JVD] : no jugular venous distention [No Lymphadenopathy] : no lymphadenopathy [Supple] : supple [No Respiratory Distress] : no respiratory distress  [No Accessory Muscle Use] : no accessory muscle use [Clear to Auscultation] : lungs were clear to auscultation bilaterally [Normal Rate] : normal rate  [Regular Rhythm] : with a regular rhythm [Normal S1, S2] : normal S1 and S2 [No Edema] : there was no peripheral edema [No Extremity Clubbing/Cyanosis] : no extremity clubbing/cyanosis [Soft] : abdomen soft [Non Tender] : non-tender [Non-distended] : non-distended [No Masses] : no abdominal mass palpated [Normal Bowel Sounds] : normal bowel sounds [Normal Posterior Cervical Nodes] : no posterior cervical lymphadenopathy [Normal Anterior Cervical Nodes] : no anterior cervical lymphadenopathy [No CVA Tenderness] : no CVA  tenderness [No Joint Swelling] : no joint swelling [Grossly Normal Strength/Tone] : grossly normal strength/tone [No Rash] : no rash [Coordination Grossly Intact] : coordination grossly intact [No Focal Deficits] : no focal deficits [Normal Gait] : normal gait [Deep Tendon Reflexes (DTR)] : deep tendon reflexes were 2+ and symmetric [Speech Grossly Normal] : speech grossly normal [Normal Affect] : the affect was normal [Normal Mood] : the mood was normal [Normal Insight/Judgement] : insight and judgment were intact [de-identified] : no egophany [de-identified] : no hepatosplenomegaly. negative martin's sign, rovsing's, psoas. no rebound tenderness.

## 2024-04-29 NOTE — PLAN
[FreeTextEntry1] : Time spent on history, anatomy, review of systems, physical exam, patient education, medication side effect profile, differential diagnoses, tests ordered.

## 2024-04-29 NOTE — REVIEW OF SYSTEMS
[Memory Loss] : memory loss [Negative] : Heme/Lymph [Headache] : no headache [Dizziness] : no dizziness [Fainting] : no fainting [Unsteady Walk] : no ataxia [FreeTextEntry7] : nausea/vomiting/diarrhea has subsided per pt [de-identified] : forgetfulness

## 2024-05-01 ENCOUNTER — NON-APPOINTMENT (OUTPATIENT)
Age: 79
End: 2024-05-01

## 2024-05-01 LAB
APPEARANCE: ABNORMAL
BILIRUBIN URINE: NEGATIVE
BLOOD URINE: NEGATIVE
COLOR: YELLOW
DEPRECATED O AND P PREP STL: NORMAL
GI PCR PANEL: DETECTED
GLUCOSE QUALITATIVE U: NEGATIVE MG/DL
H PYLORI AG STL QL: NEGATIVE
KETONES URINE: NEGATIVE MG/DL
LEUKOCYTE ESTERASE URINE: ABNORMAL
NITRITE URINE: NEGATIVE
NOROVIRUS GI/GII: DETECTED
PH URINE: 6
PROTEIN URINE: NORMAL MG/DL
SPECIFIC GRAVITY URINE: 1.02
UROBILINOGEN URINE: 1 MG/DL

## 2024-05-14 ENCOUNTER — APPOINTMENT (OUTPATIENT)
Dept: HEART AND VASCULAR | Facility: CLINIC | Age: 79
End: 2024-05-14
Payer: MEDICARE

## 2024-05-14 VITALS
HEART RATE: 74 BPM | SYSTOLIC BLOOD PRESSURE: 102 MMHG | WEIGHT: 163.7 LBS | DIASTOLIC BLOOD PRESSURE: 62 MMHG | HEIGHT: 69 IN | OXYGEN SATURATION: 98 % | TEMPERATURE: 97.2 F | BODY MASS INDEX: 24.24 KG/M2

## 2024-05-14 DIAGNOSIS — I48.91 UNSPECIFIED ATRIAL FIBRILLATION: ICD-10-CM

## 2024-05-14 DIAGNOSIS — I10 ESSENTIAL (PRIMARY) HYPERTENSION: ICD-10-CM

## 2024-05-14 DIAGNOSIS — I25.10 ATHEROSCLEROTIC HEART DISEASE OF NATIVE CORONARY ARTERY W/OUT ANGINA PECTORIS: ICD-10-CM

## 2024-05-14 DIAGNOSIS — I70.0 ATHEROSCLEROSIS OF AORTA: ICD-10-CM

## 2024-05-14 DIAGNOSIS — E78.5 HYPERLIPIDEMIA, UNSPECIFIED: ICD-10-CM

## 2024-05-14 DIAGNOSIS — I73.9 PERIPHERAL VASCULAR DISEASE, UNSPECIFIED: ICD-10-CM

## 2024-05-14 DIAGNOSIS — I65.29 OCCLUSION AND STENOSIS OF UNSPECIFIED CAROTID ARTERY: ICD-10-CM

## 2024-05-14 DIAGNOSIS — I35.0 NONRHEUMATIC AORTIC (VALVE) STENOSIS: ICD-10-CM

## 2024-05-14 DIAGNOSIS — Z95.2 PRESENCE OF PROSTHETIC HEART VALVE: ICD-10-CM

## 2024-05-14 DIAGNOSIS — Z95.0 PRESENCE OF CARDIAC PACEMAKER: ICD-10-CM

## 2024-05-14 DIAGNOSIS — I49.5 SICK SINUS SYNDROME: ICD-10-CM

## 2024-05-14 PROCEDURE — G2211 COMPLEX E/M VISIT ADD ON: CPT

## 2024-05-14 PROCEDURE — 99215 OFFICE O/P EST HI 40 MIN: CPT

## 2024-05-14 NOTE — HISTORY OF PRESENT ILLNESS
[FreeTextEntry1] : Mr. Castorena presents for follow up and management of CAD s/p CABG x 2 (SVG->RCA, SVG->LCX) (04/12/15 at time of AVR), s/p PCI prox/mid LAD and OM1 (07/02/20), aortic stenosis s/p bioprosthetic AVR (04/12/15), aortic atherosclerosis, HTN, Cushing's syndrome, PAD, sick sinus syndrome s/p Biotronik PPM (08/20/20), paroxysmal atrial fibrillation, and antiphospholipid syndrome.  He was previously followed by Guillermo Muir MD.  On 04/12/15 he underwent a bioprosthetic AVR (# 25 Medtronic porcine) and CABG x 2 with Russ Lester MD at [facility].  .   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  He is following with a neurosurgeon, Carol Flores MD, and a head and neck surgeon, Javier Arguelles MD, at [facility] and had pituitary surgery on 05/16/20.  On 05/26/20, I received a call from a cardiologist at [facility], Angelica Katz MD, informing me of a 16 beat run of NSVT during his post operative course.  The run was asymptomatic. He was initiated on Toprol XL 50mg po qd.  On 06/11/20, had a 3.6 second pause during bowel movement noted on MCT monitor.  We agreed to see a heart rhythm specialist. He was evaluated by Franchesca Murcia MD and was tapered off beta blockers.  On 06/16/20 he underwent a CTCA which revealed SVG-distal RCA patent, SVG-OM occluded, LM normal, LAD prox severe, mid moderate, D1 moderate, D2 mild, LCx mild, OM1 severe, LPL normal, RCA prox severe, mid occluded, and distal with patent graft.  He underwent invasive coronary angiography on 07/02/20 which revealed LM mild, LAD prox and mid 80% (FFR +) underwent PCI, LCx mild, OM1 90% underwent PCI, RCA prox 100%, patent SVG-RCA, occluded SVG-OM1.  The following day he had a groin bleed and a sinus pause overnight but otherwise did well.  He underwent implantation of a Bitronik dual chamber pacemaker on 08/20/20.  On 05/06/21 he had an echocardiogram which revealed an EF of 69%, mild LVH, grade I diastolic dysfunction, normally functioning bioprosthetic AVR, and a PPM.   In February, 2022, his device interrogation revealed paroxysmal atrial fibrillation.  On 07/12/22 he had an exercise stress echocardiogram which revealed an EF of 62%, s/p bioprosthetic AVR normally functioning (PV 2.4 m/s, MG 12 mmHg), mild MS (MG 4 mmHg at 74 bpm), PPM leads, and normal wall motion, PA pressures, and ECG post 7.9 METs of exercise. Today, 04/03/24, he had an echocardiogram which revealed an EF of 71%, grade I diastolic dysfunction, s/p 25 mm Medtronic porcine AVR (normally functioning), moderate MAC, mild MS (MG 2.5 mmHg at 79 bpm), mild TR, dilated ascending aorta (4.0 cm, 2.1 cm/m2), and a PPM.  He is seen today, 05/14/24, as an urgent add-on secondary to complaints of transient SOB.  He was started on Istrusia (osilodrostat) to treat Cushing's disease.  After four days on the medications, he developed altered mood, loss of appetite, and SOB.  Since discontinuing the medication, he feels better.

## 2024-05-14 NOTE — ASSESSMENT
[FreeTextEntry1] : ======================================================================================= 1. CAD: s/p CABG x 2 at time of AVR 04/25/15, SVG->LCx, SVG->RCA, Russ Lester MD at [facility]: 07/02/20: PCI prox/mid LAD and OM1: CCS 0:     - will pursue aggressive medical management     - continue ASA 81mg po qd  2. Aortic stenosis: s/p bioprosthetic AVR, #25 Medtronic porcine, Russ Lester MD at [facility] 04/25/15, s/p echo 03/10/23: EF 60%, s/p 25 mm Medtronic porcine AVR (normally functioning), moderate MAC, mild MS (MG 3.0 mmHg at 75 bpm), mild TR, PPM:     - discussed with patient SBE prophylaxis per AHA guidelines     - will follow with serial echocardiograms   3. HTN: ? secondary to Cushing's syndrome: BP at ACC/AHA 2017 guideline target:     - continue losartan//25mg po qd     - continue metoprolol succinate 25mg po qd     - continue amlodipine 10mg po qd   4. PAD: s/p 03/10/23: sono: right dSFA 20-49%, left CFA 20-49%, left p/dPOP 20-49%:     - will pursue aggressive medical management     - will follow with serial bilateral lower extremity arterial sonography  5. Dyslipidemia: LDL 38 (08/30/23):     - continue rosuvastatin 40mg po qd     - discussed therapeutic lifestyle changes to promote improved lipid metabolism  6. 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 Antiphospholipid syndrome: no history of arterial or venous thrombosis, details unclear, not anticoagulated for this diagnosis:     - follow up with hematologist, Chris Lima MD  8. Sick sinus syndrome: likely chronotropic incompetence:     - s/p Biotronik dual chamber PPM with Franchesca Murcia MD on 08/20/20     - follow up with device clinic  9. Carotid atherosclerosis: s/p 03/10/23: sono: b/l prox ICA 20-49% stenosis:     - will pursue aggressive medical management     - will follow with serial carotid artery sonograms  10. 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 Atrial fibrillation, paroxysmal: Eliquis prohibitively expensive:     - continue systemic anticoagulation with Xarelto 20mg po qd (with largest meal of the day)     - discussed with patient avoidance of ASA and NSAIDS as well as need for bleeding surveillance  12. TEIXEIRA: resolved: ? secondary to Istursia:     - no evidence of active CAD or LV dysfunction      - will consider sending send for an exercise stress echocardiogram to rule out inducible ischemia if symptoms recur   I spent > 45 minutes of total time on this visit, including time spent face-to-face and non-face-to-face.  During this time, I took a relevant history and examined the patient.  I reviewed relevant portions of the medical record and formulated a differential diagnosis and plan.  I explained the relevant cardiac diagnoses to the patient, as well as the work up and management plan.  I answered all questions related to the patient's cardiac conditions.

## 2024-05-14 NOTE — REASON FOR VISIT
[Other: ____] : [unfilled] [FreeTextEntry1] : ======================================================================================= Diagnostic Tests: -------------------------------------------------------------- EC24: A-paced, V sensed, incomplete RBBB. 23: A-paced, V sensed, incomplete RBBB. 22: A-paced, V-sensed, incomplete RBBB.  22: A-paced, V-sensed, incomplete RBBB.  21: A-paced, V-sensed, incomplete RBBB.  21: A-paced, V-sensed, RSR' V1 without ST segment elevation.  20: sinus rhythm, incomplete RBBB.  20: sinus rhythm, incomplete RBBB.  20: sinus bradycardia at 42 bpm, incomplete RBBB.  20: sinus bradycardia at 53 bpm, incomplete RBBB.  20: marked sinus bradycardia at 49 bpm, incomplete RBBB.  -------------------------------------------------------------- MR: 19: abdomen: left adrenal nodule 2.7cm, 2cm right renal cell carcinoma, hepatic steatosis.  -------------------------------------------------------------- CT: 20: CTCA: SVG-distal RCA patent, SVG-OM occluded, LM normal, LAD prox severe, mid moderate, D1 moderate, D2 mild, LCx mild, OM1 severe, LPL normal, RCA prox severe, mid occluded, distal with patent graft. 03/10/20: CT abdomen/pelvis: s/p excision right renal mass, cyst lateral to aortic bifurcation, aorta-iliac atherosclerotic calcification.  19: CTA chest/abdomen/pelvis: no aortic dissection, + severe aortic and coronary plaque, aortic valve calcifications, 3.6cm left adrenal mass, 2cm right renal mass.  -------------------------------------------------------------- Echo: 24: EF 71%, grade I diastolic dysfunction, s/p 25 mm Medtronic porcine AVR (normally functioning), moderate MAC, mild MS (MG 2.5 mmHg at 79 bpm), mild TR, dilated ascending aorta (4.0 cm, 2.1 cm/m2), PPM. 03/10/23: EF 60%, s/p 25 mm Medtronic porcine AVR (normally functioning), moderate MAC, mild MS (MG 3.0 mmHg at 75 bpm), mild TR, PPM.  22: EF 66%, normally functioning bioprosthetic AVR, mild MR, PPM. 21: EF 69%, mild LVH, grade I diastolic dysfunction, normally functioning bioprosthetic AVR, PPM.  20: EF 67%, normally functioning bioprosthetic AVR, mild LVH, dilated LA.  20: EF 63%, mildly dilated LA, s/p bioprosthetic AVR normally functioning, mild MR, mild MS secondary to MAC, PASP 32mmHg.  1019: EF 55%, LVH, dilated LA, bioprosthetic AVR normally functioning, dilated ascending aorta.  -------------------------------------------------------------- Stress: 22: exercise stress echo: EF 62%, s/p bioprosthetic AVR normally functioning (PV 2.4 m/s, MG 12 mmHg), mild MS (MG 4 mmHg at 74 bpm), PPM leads, normal wall motion, PA pressures, and ECG post 7.9 METs of exercise.  22: exercise treadmill ECG: normal ECG post 7.0 METS.   20: exercise stress echo: EF 63%, mild MR, s/p bioprosthetic AVR, normal wall motion, PA pressures, and ECG post 10.1 METs.  07/21/15: ETT: 7 minutes of Wilson, no ischemia.  -------------------------------------------------------------- Cath: 20: LM mild, LAD prox and mid 80% (FFR +) underwent PCI, LCx mild, OM1 90% underwent PCI, RCA prox 100%, patent SVG-RCA, occluded SVG-OM1.   03/03/15: LM normal, LAD prox 60%, distal 70%, LCX 80%, OM1 80%, RCA prox 60%, AV peak-to-peak 49mmHg, MARYLOU 0.9cm2. -------------------------------------------------------------- Monitor: 20 to 20: [employer] MCT: HR 25/50/120, <1% PVCs, <1% APCS, 18 pauses > 2 seconds, longest > 4 seconds, no AF. -------------------------------------------------------------- Lower extremity arteries: 03/10/23: sono: right dSFA 20-49%, left CFA 20-49%, left p/dPOP 20-49%.  22: sono: right dSFA 20-49%, right dATA 20-49%, left dATA segmental occlusion. 21: sono: right dSFA 20-49%, right pPOP 20-49%, left CFA 20-49%, left XAVIER not occluded.  20: sono: left XAVIER 100% mid, left CFA 20-49% stenosis, right CFA <20% stenosis.  -------------------------------------------------------------- Carotid arteries: 03/10/23: sono: b/l prox ICA 20-49% stenosis.  22: sono: b/l prox ICA 20-49% stenosis.  21: sono: b/l prox ICA 20-49% stenosis.

## 2024-05-20 ENCOUNTER — NON-APPOINTMENT (OUTPATIENT)
Age: 79
End: 2024-05-20

## 2024-05-20 ENCOUNTER — APPOINTMENT (OUTPATIENT)
Dept: HEART AND VASCULAR | Facility: CLINIC | Age: 79
End: 2024-05-20
Payer: MEDICARE

## 2024-05-20 VITALS
OXYGEN SATURATION: 96 % | TEMPERATURE: 96.1 F | WEIGHT: 162 LBS | RESPIRATION RATE: 18 BRPM | DIASTOLIC BLOOD PRESSURE: 69 MMHG | HEIGHT: 69 IN | HEART RATE: 73 BPM | BODY MASS INDEX: 23.99 KG/M2 | SYSTOLIC BLOOD PRESSURE: 130 MMHG

## 2024-05-20 PROCEDURE — 93280 PM DEVICE PROGR EVAL DUAL: CPT

## 2024-05-20 PROCEDURE — 99213 OFFICE O/P EST LOW 20 MIN: CPT | Mod: 25

## 2024-05-20 NOTE — REVIEW OF SYSTEMS
[Fever] : no fever [Chills] : no chills [Feeling Fatigued] : not feeling fatigued [SOB] : no shortness of breath [Dyspnea on exertion] : not dyspnea during exertion [Chest Discomfort] : no chest discomfort [Palpitations] : no palpitations [Syncope] : no syncope [Depression] : depression [Negative] : Heme/Lymph

## 2024-05-20 NOTE — HISTORY OF PRESENT ILLNESS
[FreeTextEntry1] : Mr. Castorena is a 78 y/o M with history of bioprosthetic AVR / CABG x 2 in 2015 at [place] (SVG-RCA and SVG-LCx), aortic atherosclerosis, HTN, right renal mass resection (benign), left adrenal mass, Cushing's disease likely secondary to pituitary tumor s/p surgery at [facility] [place] 5/2020, antiphospholipid syndrome, Depression and sinus pauses now s/p PPM-D (Biotronik) 8/20/2020 and paroxysmal atrial fibrillation who presents for routine follow-up.  No device related complaints.  No palpitations, chest pain, SOB, syncope or near syncope.  Tolerating Xarelto without bleeding issues.  SEE PACEART

## 2024-05-20 NOTE — PROCEDURE
[No] : not [Sinoatrial Exit Block] : sinoatrial exit block [Pacemaker] : pacemaker [Biotronik Biomedical] : Biotronik Biomedical [Threshold Testing Performed] : Threshold testing was performed [Lead Imp:  ___ohms] : lead impedance was [unfilled] ohms [Sensing Amplitude ___mv] : sensing amplitude was [unfilled] mv [___V @] : [unfilled] V [___ ms] : [unfilled] ms [None] : none [de-identified] : ~ 40 bpm [de-identified] : Shemar 8-DRT [de-identified] : [employer] [de-identified] : [id number] [de-identified] : 8/20/2020 [de-identified] : DDD-CLS [de-identified] :  [de-identified] : 5 y 5 mo [de-identified] : AP 99%\par   0%\par  No new events

## 2024-05-20 NOTE — DISCUSSION/SUMMARY
[FreeTextEntry1] : Mr. Castorena is a 78 y/o M with history of bioprosthetic AVR / CABG x 2 in 2015 at [place] (SVG-RCA and SVG-LCx), aortic atherosclerosis, HTN, right renal mass resection (benign), left adrenal mass, Cushing's disease likely secondary to pituitary tumor s/[place] [facility] [place], antiphospholipid syndrome, and sinus pauses now s/p PPM-D (Biotronik) 8/20/2020 and paroxysmal atrial fibrillation.  1.  AF Maintaining sinus rhythm  Continue Metoprolol for rate control  2.  Stroke Risk CHADS VASC at least 4 Continue Xarelto for TE/CVA ppx.  3.  PPM The device is functioning appropriately as programmed and all measured data is WNL.  The patient is enrolled in remote monitoring and was asked to return for follow-up in six months.  Advised to call with any questions or concerns in the interim.

## 2024-05-20 NOTE — ADDENDUM
[FreeTextEntry1] : I, Ashlee Banda, am scribing for and the presence of Dr. Murcia the following sections: HPI, PMH,Family/social history, ROS, Physical Exam, Assessment / Plan.   I, Franchesca Murcia, personally performed the services described in the documentation, reviewed the documentation recorded by the scribe in my presence and it accurately and completely records my words and actions.

## 2024-05-20 NOTE — PHYSICAL EXAM
[General Appearance - Well Developed] : well developed [Normal Appearance] : normal appearance [Well Groomed] : well groomed [General Appearance - Well Nourished] : well nourished [No Deformities] : no deformities [General Appearance - In No Acute Distress] : no acute distress [Heart Rate And Rhythm] : heart rate and rhythm were normal [Heart Sounds] : normal S1 and S2 [] : no respiratory distress [Respiration, Rhythm And Depth] : normal respiratory rhythm and effort [Exaggerated Use Of Accessory Muscles For Inspiration] : no accessory muscle use [Auscultation Breath Sounds / Voice Sounds] : lungs were clear to auscultation bilaterally [Left Infraclavicular] : left infraclavicular area [Clean] : clean [Dry] : dry [Well-Healed] : well-healed [Palpable Crepitus] : no palpable crepitus [Bleeding] : no active bleeding [Foul Odor] : no foul smell [Purulent Drainage] : no purulent drainage [Serosanguineous Drainage] : no serosanquineous drainage [Serous Drainage] : no serous drainage [Erythema] : not erythematous [Warm] : not warm [Tender] : not tender [Indurated] : not indurated [Fluctuant] : not fluctuant [FreeTextEntry1] : No LE edema  [Normal Conjunctiva] : the conjunctiva exhibited no abnormalities [Normal Oral Mucosa] : normal oral mucosa [Normal Jugular Venous V Waves Present] : normal jugular venous V waves present [Abnormal Walk] : normal gait [Skin Color & Pigmentation] : normal skin color and pigmentation [Oriented To Time, Place, And Person] : oriented to person, place, and time [Impaired Insight] : insight and judgment were intact [Affect] : the affect was normal [Mood] : the mood was normal [No Anxiety] : not feeling anxious

## 2024-06-04 ENCOUNTER — RX RENEWAL (OUTPATIENT)
Age: 79
End: 2024-06-04

## 2024-06-04 RX ORDER — RIVAROXABAN 20 MG/1
20 TABLET, FILM COATED ORAL DAILY
Qty: 90 | Refills: 3 | Status: ACTIVE | COMMUNITY
Start: 2022-03-09 | End: 1900-01-01

## 2024-07-08 ENCOUNTER — RX RENEWAL (OUTPATIENT)
Age: 79
End: 2024-07-08

## 2024-07-23 ENCOUNTER — APPOINTMENT (OUTPATIENT)
Dept: OTOLARYNGOLOGY | Facility: CLINIC | Age: 79
End: 2024-07-23
Payer: MEDICARE

## 2024-07-23 VITALS
DIASTOLIC BLOOD PRESSURE: 70 MMHG | OXYGEN SATURATION: 100 % | SYSTOLIC BLOOD PRESSURE: 111 MMHG | TEMPERATURE: 97.8 F | HEART RATE: 69 BPM

## 2024-07-23 DIAGNOSIS — H61.23 IMPACTED CERUMEN, BILATERAL: ICD-10-CM

## 2024-07-23 DIAGNOSIS — R43.2 PARAGEUSIA: ICD-10-CM

## 2024-07-23 DIAGNOSIS — R48.1 AGNOSIA: ICD-10-CM

## 2024-07-23 DIAGNOSIS — J34.2 DEVIATED NASAL SEPTUM: ICD-10-CM

## 2024-07-23 PROCEDURE — 31575 DIAGNOSTIC LARYNGOSCOPY: CPT

## 2024-07-23 PROCEDURE — 99205 OFFICE O/P NEW HI 60 MIN: CPT | Mod: 25

## 2024-07-23 PROCEDURE — 69210 REMOVE IMPACTED EAR WAX UNI: CPT

## 2024-07-23 PROCEDURE — 31231 NASAL ENDOSCOPY DX: CPT | Mod: 59

## 2024-07-23 RX ORDER — FLUTICASONE PROPIONATE 50 UG/1
50 SPRAY, METERED NASAL DAILY
Qty: 1 | Refills: 3 | Status: ACTIVE | COMMUNITY
Start: 2024-07-23 | End: 1900-01-01

## 2024-07-24 LAB
ALBUMIN SERPL ELPH-MCNC: 4.4 G/DL
ALP BLD-CCNC: 58 U/L
ALT SERPL-CCNC: 45 U/L
ANION GAP SERPL CALC-SCNC: 14 MMOL/L
AST SERPL-CCNC: 27 U/L
BILIRUB SERPL-MCNC: 1.1 MG/DL
BUN SERPL-MCNC: 22 MG/DL
CALCIUM SERPL-MCNC: 9.7 MG/DL
CHLORIDE SERPL-SCNC: 104 MMOL/L
CO2 SERPL-SCNC: 27 MMOL/L
CREAT SERPL-MCNC: 1.18 MG/DL
CRP SERPL-MCNC: <3 MG/L
EGFR: 64 ML/MIN/1.73M2
ENA SS-A AB SER IA-ACNC: <0.2 AL
ENA SS-B AB SER IA-ACNC: <0.2 AL
ERYTHROCYTE [SEDIMENTATION RATE] IN BLOOD BY WESTERGREN METHOD: 2 MM/HR
FERRITIN SERPL-MCNC: 33 NG/ML
FOLATE SERPL-MCNC: 11.8 NG/ML
GLUCOSE SERPL-MCNC: 84 MG/DL
POTASSIUM SERPL-SCNC: 4.2 MMOL/L
PROT SERPL-MCNC: 6.7 G/DL
SODIUM SERPL-SCNC: 145 MMOL/L
THYROGLOB AB SERPL-ACNC: <20 IU/ML
THYROPEROXIDASE AB SERPL IA-ACNC: <10 IU/ML
TSH SERPL-ACNC: 2.21 UIU/ML
VIT B12 SERPL-MCNC: 613 PG/ML

## 2024-07-24 NOTE — HISTORY OF PRESENT ILLNESS
[de-identified] : 7/23/24: 80 y/o M presents with diminished sense of taste over the past year, it has not been gradual. He cannot consistently taste things that are sweet and feels he is only tasting about 10-20% of foods. He can taste salty, sour. No changes in sense of smell and he can smell perfumes. He denies nasal obstruction, nasal congestion. He has history of trans -sphenoidal surgery approximately 5 years ago at [facility], combined procedure with ENT/ neurosurg.

## 2024-07-24 NOTE — ASSESSMENT
[FreeTextEntry1] : - 7/23/24: 80 y/o M presents with diminished sense of taste over the past year, it has not been gradual. He cannot consistently taste things that are sweet and feels he is only tasting about 10-20% of foods. He can taste salty, sour. He was able to taste Lidocaine spray at today's visit. On physical exam/ nasal endoscopy he was found to have left septal deviation and evidence of post-surgical changes from right trans-sphenoidal surgery. Laryngoscopy was normal. I am recommending bloodwork to rule out vitamin deficiencies/ rheumatologic disorders. I am recommending nasal saline rinses and nasal steroids. I am also recommending smell training. Follow up in 2-3 weeks.  On exam there is evidence of cerumen impaction. This was cleaned today without issue. Patient noted immediate improvement back to baseline.   -Bloodwork to eval for rheumatologic issue/vitamin deficiency orally -nasal saline rinses and nasal steroids  -smell training  -Follow up in 2-3 weeks

## 2024-07-24 NOTE — PROCEDURE
[FreeTextEntry3] :  Ear cleaning: Cerumen Removal/Ear Cleaning for Otitis Externa Pre-operative Diagnosis: Bilateral Cerumen Impaction Post-operative Diagnosis: Same Procedure: Binocular microscopy with cerumen removal- 94414 Procedure Details: The patient was placed in the supine position. The operating microscope was positioned. I then placed the ear speculum in the EAC. Cerumen was then removed using a mixture of otologic curettes, and suction. The TM was noted to be intact. I then performed the procedure of the opposite ear in similar fashion. The patient tolerated procedure well. Findings: Bilateral Ear Canal - normal Bilateral Tympanic Membrane - normal Recommendations: Debrox Complications: None [FreeTextEntry6] : -  Pre-operative Diagnosis: Dysgeusia Post-operative Diagnosis: Left septal deviation, no obstruction of olfactory nerve, evidence of postsurgical changes from right transsphenoidal surgery  Anesthesia: Topical Procedure: Bilateral nasal endoscopy Procedure Details:   After topical anesthesia and decongestant, the patient was placed in the supine position. The telescope was passed along the left nasal floor to the nasopharynx. It was then passed into the region of the middle meatus, middle turbinate, and the sphenoethmoid region. An identical procedure was performed on the right side.   Findings: Mucosa:   normal Nasal septum: Left septal deviation  Discharge:  none Turbinates:  normal Adenoid:   normal Posterior choanae:  normal Eustachian tubes:  normal  Mucous stranding:  normal   Lesions:   Not present   Comments:   Condition: Stable. Patient tolerated procedure well. [de-identified] : -   Pre-operative Diagnosis: Dysgeusia Post-operative Diagnosis: Normal exam  Anesthesia: Topical - 1 % Lidocaine/Phenylephrine Procedure: Flexible Laryngoscopy   Procedure Details: The patient was placed in the sitting position. After decongestant and anesthesia were applied the laryngoscope was passed. The nasal cavities, nasopharynx, oropharynx, hypopharynx, and larynx were all examined. Vocal folds were examined during respiration and phonation. The following findings were noted:   Findings: Nose: Septum is midline, turbinates are normal, nasal airways patent, mucosa normal Nasopharynx: Adenoids normal, no masses, eustachian tube normal Oropharynx: Pharyngeal walls symmetric and without lesion. Tonsils/fossae symmetric and normal. Hypopharynx: Hypopharynx and pyriform sinuses without lesion. No masses or asymmetry. No pooling of secretions. Larynx: Epiglottis and aryepiglottic folds were sharp and crisp bilaterally. Bilateral false and true vocal folds normal appearance. Bilateral vocal folds fully mobile and symmetric. Airway was widely patent.   Condition: Stable. Patient tolerated procedure well.   Complications: None

## 2024-07-24 NOTE — PHYSICAL EXAM
[] : septum deviated to the left [Normal] : mucosa is normal [Midline] : trachea located in midline position [de-identified] : bilateral cerumen impaction- cleaned away with suction/curette with no issues.

## 2024-07-24 NOTE — HISTORY OF PRESENT ILLNESS
[de-identified] : 7/23/24: 78 y/o M presents with diminished sense of taste over the past year, it has not been gradual. He cannot consistently taste things that are sweet and feels he is only tasting about 10-20% of foods. He can taste salty, sour. No changes in sense of smell and he can smell perfumes. He denies nasal obstruction, nasal congestion. He has history of trans -sphenoidal surgery approximately 5 years ago at [facility], combined procedure with ENT/ neurosurg.

## 2024-07-24 NOTE — ASSESSMENT
[FreeTextEntry1] : - 7/23/24: 78 y/o M presents with diminished sense of taste over the past year, it has not been gradual. He cannot consistently taste things that are sweet and feels he is only tasting about 10-20% of foods. He can taste salty, sour. He was able to taste Lidocaine spray at today's visit. On physical exam/ nasal endoscopy he was found to have left septal deviation and evidence of post-surgical changes from right trans-sphenoidal surgery. Laryngoscopy was normal. I am recommending bloodwork to rule out vitamin deficiencies/ rheumatologic disorders. I am recommending nasal saline rinses and nasal steroids. I am also recommending smell training. Follow up in 2-3 weeks.  On exam there is evidence of cerumen impaction. This was cleaned today without issue. Patient noted immediate improvement back to baseline.   -Bloodwork to eval for rheumatologic issue/vitamin deficiency orally -nasal saline rinses and nasal steroids  -smell training  -Follow up in 2-3 weeks

## 2024-07-24 NOTE — PHYSICAL EXAM
[] : septum deviated to the left [Normal] : mucosa is normal [Midline] : trachea located in midline position [de-identified] : bilateral cerumen impaction- cleaned away with suction/curette with no issues.

## 2024-07-24 NOTE — PROCEDURE
[FreeTextEntry3] :  Ear cleaning: Cerumen Removal/Ear Cleaning for Otitis Externa Pre-operative Diagnosis: Bilateral Cerumen Impaction Post-operative Diagnosis: Same Procedure: Binocular microscopy with cerumen removal- [id number] Procedure Details: The patient was placed in the supine position. The operating microscope was positioned. I then placed the ear speculum in the EAC. Cerumen was then removed using a mixture of otologic curettes, and suction. The TM was noted to be intact. I then performed the procedure of the opposite ear in similar fashion. The patient tolerated procedure well. Findings: Bilateral Ear Canal - normal Bilateral Tympanic Membrane - normal Recommendations: Debrox Complications: None [FreeTextEntry6] : -  Pre-operative Diagnosis: Dysgeusia Post-operative Diagnosis: Left septal deviation, no obstruction of olfactory nerve, evidence of postsurgical changes from right transsphenoidal surgery  Anesthesia: Topical Procedure: Bilateral nasal endoscopy Procedure Details:   After topical anesthesia and decongestant, the patient was placed in the supine position. The telescope was passed along the left nasal floor to the nasopharynx. It was then passed into the region of the middle meatus, middle turbinate, and the sphenoethmoid region. An identical procedure was performed on the right side.   Findings: Mucosa:   normal Nasal septum: Left septal deviation  Discharge:  none Turbinates:  normal Adenoid:   normal Posterior choanae:  normal Eustachian tubes:  normal  Mucous stranding:  normal   Lesions:   Not present   Comments:   Condition: Stable. Patient tolerated procedure well. [de-identified] : -   Pre-operative Diagnosis: Dysgeusia Post-operative Diagnosis: Normal exam  Anesthesia: Topical - 1 % Lidocaine/Phenylephrine Procedure: Flexible Laryngoscopy   Procedure Details: The patient was placed in the sitting position. After decongestant and anesthesia were applied the laryngoscope was passed. The nasal cavities, nasopharynx, oropharynx, hypopharynx, and larynx were all examined. Vocal folds were examined during respiration and phonation. The following findings were noted:   Findings: Nose: Septum is midline, turbinates are normal, nasal airways patent, mucosa normal Nasopharynx: Adenoids normal, no masses, eustachian tube normal Oropharynx: Pharyngeal walls symmetric and without lesion. Tonsils/fossae symmetric and normal. Hypopharynx: Hypopharynx and pyriform sinuses without lesion. No masses or asymmetry. No pooling of secretions. Larynx: Epiglottis and aryepiglottic folds were sharp and crisp bilaterally. Bilateral false and true vocal folds normal appearance. Bilateral vocal folds fully mobile and symmetric. Airway was widely patent.   Condition: Stable. Patient tolerated procedure well.   Complications: None

## 2024-07-25 LAB — ANA SER IF-ACNC: NEGATIVE

## 2024-07-29 LAB
VIT B1 SERPL-MCNC: 124.3 NMOL/L
VIT B2 SERPL-MCNC: 274 UG/L
VIT B6 SERPL-MCNC: 6.8 UG/L

## 2024-07-30 LAB — PANTOTHENATE SERPLBLD-MCNC: 89.9 NG/ML

## 2024-08-09 ENCOUNTER — NON-APPOINTMENT (OUTPATIENT)
Age: 79
End: 2024-08-09

## 2024-08-12 ENCOUNTER — APPOINTMENT (OUTPATIENT)
Dept: OTOLARYNGOLOGY | Facility: CLINIC | Age: 79
End: 2024-08-12
Payer: MEDICARE

## 2024-08-12 VITALS
HEART RATE: 75 BPM | TEMPERATURE: 97.9 F | DIASTOLIC BLOOD PRESSURE: 72 MMHG | SYSTOLIC BLOOD PRESSURE: 102 MMHG | OXYGEN SATURATION: 98 %

## 2024-08-12 DIAGNOSIS — R43.2 PARAGEUSIA: ICD-10-CM

## 2024-08-12 DIAGNOSIS — R43.9 UNSPECIFIED DISTURBANCES OF SMELL AND TASTE: ICD-10-CM

## 2024-08-12 PROCEDURE — 99214 OFFICE O/P EST MOD 30 MIN: CPT

## 2024-08-12 NOTE — ASSESSMENT
[FreeTextEntry1] : - 7/23/24: 80 y/o M presents with diminished sense of taste over the past year, it has not been gradual. He cannot consistently taste things that are sweet and feels he is only tasting about 10-20% of foods. He can taste salty, sour. He was able to taste Lidocaine spray at today's visit. On physical exam/ nasal endoscopy he was found to have left septal deviation and evidence of post-surgical changes from right trans-sphenoidal surgery. Laryngoscopy was normal. I am recommending bloodwork to rule out vitamin deficiencies/ rheumatologic disorders. I am recommending nasal saline rinses and nasal steroids. I am also recommending smell training. Follow up in 2-3 weeks.  On exam there is evidence of cerumen impaction. This was cleaned today without issue. Patient noted immediate improvement back to baseline.   8/12/2024 Essentially no change in symptoms.  Blood work was all normal.  Has been using nasal saline nasal steroids for the past 2 weeks.  Has not been doing the smell training.  I am not entirely sure why he is the symptomatic.  Could his previous history from the [facility] play a role.  I recommending consultation with rhinology for further evaluation and potential imaging.  Patient can follow-up with me as needed.  -Consultation with rhinology -nasal saline rinses and nasal steroids  -smell training

## 2024-08-12 NOTE — HISTORY OF PRESENT ILLNESS
[de-identified] : 7/23/24: 78 y/o M presents with diminished sense of taste over the past year, it has not been gradual. He cannot consistently taste things that are sweet and feels he is only tasting about 10-20% of foods. He can taste salty, sour. No changes in sense of smell and he can smell perfumes. He denies nasal obstruction, nasal congestion. He has history of trans -sphenoidal surgery approximately 5 years ago at [facility], combined procedure with ENT/ neurosurg.  - [FreeTextEntry1] : 8/12/2024 Has been using nasal saline and nasal steroids.  Notes minimal improvement.  Has not tried smell training.  Did complete blood work and presents to review.

## 2024-08-12 NOTE — PHYSICAL EXAM
[] : septum deviated to the left [Normal] : mucosa is normal [Midline] : trachea located in midline position [de-identified] : bilateral cerumen impaction- cleaned away with suction/curette with no issues.

## 2024-08-19 ENCOUNTER — TRANSCRIPTION ENCOUNTER (OUTPATIENT)
Age: 79
End: 2024-08-19

## 2024-08-21 ENCOUNTER — APPOINTMENT (OUTPATIENT)
Dept: HEART AND VASCULAR | Facility: CLINIC | Age: 79
End: 2024-08-21
Payer: MEDICARE

## 2024-08-21 VITALS
BODY MASS INDEX: 24.44 KG/M2 | WEIGHT: 165 LBS | DIASTOLIC BLOOD PRESSURE: 72 MMHG | HEART RATE: 70 BPM | SYSTOLIC BLOOD PRESSURE: 120 MMHG | HEIGHT: 69 IN | OXYGEN SATURATION: 98 %

## 2024-08-21 DIAGNOSIS — I73.9 PERIPHERAL VASCULAR DISEASE, UNSPECIFIED: ICD-10-CM

## 2024-08-21 DIAGNOSIS — Z95.2 PRESENCE OF PROSTHETIC HEART VALVE: ICD-10-CM

## 2024-08-21 DIAGNOSIS — E78.5 HYPERLIPIDEMIA, UNSPECIFIED: ICD-10-CM

## 2024-08-21 DIAGNOSIS — I48.91 UNSPECIFIED ATRIAL FIBRILLATION: ICD-10-CM

## 2024-08-21 DIAGNOSIS — I25.10 ATHEROSCLEROTIC HEART DISEASE OF NATIVE CORONARY ARTERY W/OUT ANGINA PECTORIS: ICD-10-CM

## 2024-08-21 DIAGNOSIS — I35.0 NONRHEUMATIC AORTIC (VALVE) STENOSIS: ICD-10-CM

## 2024-08-21 DIAGNOSIS — Z95.0 PRESENCE OF CARDIAC PACEMAKER: ICD-10-CM

## 2024-08-21 DIAGNOSIS — I49.5 SICK SINUS SYNDROME: ICD-10-CM

## 2024-08-21 DIAGNOSIS — I70.0 ATHEROSCLEROSIS OF AORTA: ICD-10-CM

## 2024-08-21 DIAGNOSIS — I65.29 OCCLUSION AND STENOSIS OF UNSPECIFIED CAROTID ARTERY: ICD-10-CM

## 2024-08-21 DIAGNOSIS — I10 ESSENTIAL (PRIMARY) HYPERTENSION: ICD-10-CM

## 2024-08-21 PROCEDURE — G2211 COMPLEX E/M VISIT ADD ON: CPT

## 2024-08-21 PROCEDURE — 99215 OFFICE O/P EST HI 40 MIN: CPT

## 2024-08-21 NOTE — REASON FOR VISIT
[Other: ____] : [unfilled] [FreeTextEntry1] : ======================================================================================= Diagnostic Tests: -------------------------------------------------------------- EC24: A-paced, V sensed, incomplete RBBB. 23: A-paced, V sensed, incomplete RBBB. 22: A-paced, V-sensed, incomplete RBBB.  22: A-paced, V-sensed, incomplete RBBB.  21: A-paced, V-sensed, incomplete RBBB.  21: A-paced, V-sensed, RSR' V1 without ST segment elevation.  20: sinus rhythm, incomplete RBBB.  20: sinus rhythm, incomplete RBBB.  20: sinus bradycardia at 42 bpm, incomplete RBBB.  20: sinus bradycardia at 53 bpm, incomplete RBBB.  20: marked sinus bradycardia at 49 bpm, incomplete RBBB.  -------------------------------------------------------------- MR: 19: abdomen: left adrenal nodule 2.7cm, 2cm right renal cell carcinoma, hepatic steatosis.  -------------------------------------------------------------- CT: 20: CTCA: SVG-distal RCA patent, SVG-OM occluded, LM normal, LAD prox severe, mid moderate, D1 moderate, D2 mild, LCx mild, OM1 severe, LPL normal, RCA prox severe, mid occluded, distal with patent graft. 03/10/20: CT abdomen/pelvis: s/p excision right renal mass, cyst lateral to aortic bifurcation, aorta-iliac atherosclerotic calcification.  19: CTA chest/abdomen/pelvis: no aortic dissection, + severe aortic and coronary plaque, aortic valve calcifications, 3.6cm left adrenal mass, 2cm right renal mass.  -------------------------------------------------------------- Echo: 24: EF 71%, grade I diastolic dysfunction, s/p 25 mm Medtronic porcine AVR (normally functioning), moderate MAC, mild MS (MG 2.5 mmHg at 79 bpm), mild TR, dilated ascending aorta (4.0 cm, 2.1 cm/m2), PPM. 03/10/23: EF 60%, s/p 25 mm Medtronic porcine AVR (normally functioning), moderate MAC, mild MS (MG 3.0 mmHg at 75 bpm), mild TR, PPM.  22: EF 66%, normally functioning bioprosthetic AVR, mild MR, PPM. 21: EF 69%, mild LVH, grade I diastolic dysfunction, normally functioning bioprosthetic AVR, PPM.  20: EF 67%, normally functioning bioprosthetic AVR, mild LVH, dilated LA.  20: EF 63%, mildly dilated LA, s/p bioprosthetic AVR normally functioning, mild MR, mild MS secondary to MAC, PASP 32mmHg.  1019: EF 55%, LVH, dilated LA, bioprosthetic AVR normally functioning, dilated ascending aorta.  -------------------------------------------------------------- Stress: 22: exercise stress echo: EF 62%, s/p bioprosthetic AVR normally functioning (PV 2.4 m/s, MG 12 mmHg), mild MS (MG 4 mmHg at 74 bpm), PPM leads, normal wall motion, PA pressures, and ECG post 7.9 METs of exercise.  22: exercise treadmill ECG: normal ECG post 7.0 METS.   20: exercise stress echo: EF 63%, mild MR, s/p bioprosthetic AVR, normal wall motion, PA pressures, and ECG post 10.1 METs.  07/21/15: ETT: 7 minutes of Wilson, no ischemia.  -------------------------------------------------------------- Cath: 20: LM mild, LAD prox and mid 80% (FFR +) underwent PCI, LCx mild, OM1 90% underwent PCI, RCA prox 100%, patent SVG-RCA, occluded SVG-OM1.   03/03/15: LM normal, LAD prox 60%, distal 70%, LCX 80%, OM1 80%, RCA prox 60%, AV peak-to-peak 49mmHg, MARYLOU 0.9cm2. -------------------------------------------------------------- Monitor: 20 to 20: [employer] MCT: HR 25/50/120, <1% PVCs, <1% APCS, 18 pauses > 2 seconds, longest > 4 seconds, no AF. -------------------------------------------------------------- Lower extremity arteries: 03/10/23: sono: right dSFA 20-49%, left CFA 20-49%, left p/dPOP 20-49%.  22: sono: right dSFA 20-49%, right dATA 20-49%, left dATA segmental occlusion. 21: sono: right dSFA 20-49%, right pPOP 20-49%, left CFA 20-49%, left XAVIER not occluded.  20: sono: left XAVIER 100% mid, left CFA 20-49% stenosis, right CFA <20% stenosis.  -------------------------------------------------------------- Carotid arteries: 03/10/23: sono: b/l prox ICA 20-49% stenosis.  22: sono: b/l prox ICA 20-49% stenosis.  21: sono: b/l prox ICA 20-49% stenosis.

## 2024-08-21 NOTE — HISTORY OF PRESENT ILLNESS
[FreeTextEntry1] : Mr. Castorena presents for follow up and management of CAD s/p CABG x 2 (SVG->RCA, SVG->LCX) (04/12/15 at time of AVR), s/p PCI prox/mid LAD and OM1 (07/02/20), aortic stenosis s/p bioprosthetic AVR (04/12/15), aortic atherosclerosis, HTN, Cushing's syndrome, PAD, sick sinus syndrome s/p Biotronik PPM (08/20/20), paroxysmal atrial fibrillation, and antiphospholipid syndrome.  He was previously followed by Guillermo Muir MD.  On 04/12/15 he underwent a bioprosthetic AVR (# 25 Medtronic porcine) and CABG x 2 with Russ Lester MD at [facility].  .   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  He is following with a neurosurgeon, Carol Flores MD, and a head and neck surgeon, Javier Arguelles MD, at [facility] and had pituitary surgery on 05/16/20.  On 05/26/20, I received a call from a cardiologist at [facility], Angelica Katz MD, informing me of a 16 beat run of NSVT during his post operative course.  The run was asymptomatic. He was initiated on Toprol XL 50mg po qd.  On 06/11/20, had a 3.6 second pause during bowel movement noted on MCT monitor.  We agreed to see a heart rhythm specialist. He was evaluated by Franchesca Murcia MD and was tapered off beta blockers.  On 06/16/20 he underwent a CTCA which revealed SVG-distal RCA patent, SVG-OM occluded, LM normal, LAD prox severe, mid moderate, D1 moderate, D2 mild, LCx mild, OM1 severe, LPL normal, RCA prox severe, mid occluded, and distal with patent graft.  He underwent invasive coronary angiography on 07/02/20 which revealed LM mild, LAD prox and mid 80% (FFR +) underwent PCI, LCx mild, OM1 90% underwent PCI, RCA prox 100%, patent SVG-RCA, occluded SVG-OM1.  The following day he had a groin bleed, and a sinus pause overnight but otherwise did well.  He underwent implantation of a Bitronik dual chamber pacemaker on 08/20/20.  On 05/06/21 he had an echocardiogram which revealed an EF of 69%, mild LVH, grade I diastolic dysfunction, normally functioning bioprosthetic AVR, and a PPM.   In February 2022, his device interrogation revealed paroxysmal atrial fibrillation.  On 07/12/22 he had an exercise stress echocardiogram which revealed an EF of 62%, s/p bioprosthetic AVR normally functioning (PV 2.4 m/s, MG 12 mmHg), mild MS (MG 4 mmHg at 74 bpm), PPM leads, and normal wall motion, PA pressures, and ECG post 7.9 METs of exercise. On 04/03/24, he had an echocardiogram which revealed an EF of 71%, grade I diastolic dysfunction, s/p 25 mm Medtronic porcine AVR (normally functioning), moderate MAC, mild MS (MG 2.5 mmHg at 79 bpm), mild TR, dilated ascending aorta (4.0 cm, 2.1 cm/m2), and a PPM.  He was seen last visit, 05/14/24, as an urgent add-on secondary to complaints of transient SOB.  He was started on Istrusia (osilodrostat) to treat Cushing's disease.  After four days on the medications, he developed altered mood, loss of appetite, and SOB.  At present, he has complaints of cold intolerance, headache, and depression.

## 2024-08-21 NOTE — ASSESSMENT
[FreeTextEntry1] : ======================================================================================= 1. CAD: s/p CABG x 2 at time of AVR 04/25/15, SVG->LCx, SVG->RCA, Russ Lester MD at [facility]: 07/02/20: PCI prox/mid LAD and OM1: CCS 0:     - will pursue aggressive medical management     - continue off antiplatelet agents secondary to need for systemic anticoagulation   2. Aortic stenosis: s/p bioprosthetic AVR, #25 Medtronic porcine, Russ Lester MD at [facility] 04/25/15, s/p echo 03/10/23: EF 60%, s/p 25 mm Medtronic porcine AVR (normally functioning), moderate MAC, mild MS (MG 3.0 mmHg at 75 bpm), mild TR, PPM:     - discussed with patient SBE prophylaxis per AHA guidelines     - will follow with serial echocardiograms   3. HTN: ? secondary to Cushing's syndrome: BP at ACC/AHA 2017 guideline target:     - continue losartan//25mg po qd     - continue metoprolol succinate 25mg po qd     - continue amlodipine 10mg po qd   4. PAD: s/p 03/10/23: sono: right dSFA 20-49%, left CFA 20-49%, left p/dPOP 20-49%:     - will pursue aggressive medical management     - continue off antiplatelet agents secondary to need for systemic anticoagulation      - will follow with serial bilateral lower extremity arterial sonography  5. Dyslipidemia: LDL 38 (08/30/23):     - continue rosuvastatin 40mg po qd     - discussed therapeutic lifestyle changes to promote improved lipid metabolism     - check lab work today  6. 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 Antiphospholipid syndrome: no history of arterial or venous thrombosis, details unclear, not anticoagulated for this diagnosis:     - follow up with hematologist, Chris Lima MD  8. Sick sinus syndrome: likely chronotropic incompetence:     - s/p Biotronik dual chamber PPM with Franchesca Murcia MD on 08/20/20     - follow up with device clinic and heart rhythm specialist   9. Carotid atherosclerosis: s/p 03/10/23: sono: b/l prox ICA 20-49% stenosis:     - will pursue aggressive medical management     - continue off antiplatelet agents secondary to need for systemic anticoagulation      - will follow with serial carotid artery sonograms  10. 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 Atrial fibrillation, paroxysmal: Eliquis prohibitively expensive:     - continue systemic anticoagulation with Xarelto 20mg po qd (with largest meal of the day)     - discussed with patient avoidance of ASA and NSAIDS as well as need for bleeding surveillance  12. TEIXEIRA: resolved: ? secondary to Istursia:     - no evidence of active CAD or LV dysfunction      - will consider sending send for an exercise stress echocardiogram to rule out inducible ischemia if symptoms recur   I spent > 45 minutes of total time on this visit, including time spent face-to-face and non-face-to-face.  During this time, I took a relevant history and examined the patient.  I reviewed relevant portions of the medical record and formulated a differential diagnosis and plan.  I explained the relevant cardiac diagnoses to the patient, as well as the work up and management plan.  I answered all questions related to the patient's cardiac conditions.

## 2024-08-22 LAB
ALBUMIN SERPL ELPH-MCNC: 4.4 G/DL
ALP BLD-CCNC: 66 U/L
ALT SERPL-CCNC: 33 U/L
ANION GAP SERPL CALC-SCNC: 17 MMOL/L
AST SERPL-CCNC: 27 U/L
BASOPHILS # BLD AUTO: 0.05 K/UL
BASOPHILS NFR BLD AUTO: 0.9 %
BILIRUB SERPL-MCNC: 1 MG/DL
BUN SERPL-MCNC: 20 MG/DL
CALCIUM SERPL-MCNC: 9.8 MG/DL
CHLORIDE SERPL-SCNC: 101 MMOL/L
CHOLEST SERPL-MCNC: 102 MG/DL
CO2 SERPL-SCNC: 27 MMOL/L
CREAT SERPL-MCNC: 1.09 MG/DL
EGFR: 70 ML/MIN/1.73M2
EOSINOPHIL # BLD AUTO: 0.3 K/UL
EOSINOPHIL NFR BLD AUTO: 5.5 %
ESTIMATED AVERAGE GLUCOSE: 111 MG/DL
GLUCOSE SERPL-MCNC: 75 MG/DL
HBA1C MFR BLD HPLC: 5.5 %
HCT VFR BLD CALC: 44.5 %
HDLC SERPL-MCNC: 42 MG/DL
HGB BLD-MCNC: 14.2 G/DL
IMM GRANULOCYTES NFR BLD AUTO: 0.4 %
LDLC SERPL DIRECT ASSAY-MCNC: 49 MG/DL
LYMPHOCYTES # BLD AUTO: 1.02 K/UL
LYMPHOCYTES NFR BLD AUTO: 18.6 %
MAN DIFF?: NORMAL
MCHC RBC-ENTMCNC: 31.3 PG
MCHC RBC-ENTMCNC: 31.9 GM/DL
MCV RBC AUTO: 98 FL
MONOCYTES # BLD AUTO: 0.8 K/UL
MONOCYTES NFR BLD AUTO: 14.6 %
NEUTROPHILS # BLD AUTO: 3.28 K/UL
NEUTROPHILS NFR BLD AUTO: 60 %
PLATELET # BLD AUTO: 200 K/UL
POTASSIUM SERPL-SCNC: 4 MMOL/L
PROT SERPL-MCNC: 7 G/DL
RBC # BLD: 4.54 M/UL
RBC # FLD: 13.6 %
SODIUM SERPL-SCNC: 146 MMOL/L
TRIGL SERPL-MCNC: 77 MG/DL
TSH SERPL-ACNC: 2.2 UIU/ML
WBC # FLD AUTO: 5.47 K/UL

## 2024-08-23 ENCOUNTER — APPOINTMENT (OUTPATIENT)
Dept: HEART AND VASCULAR | Facility: CLINIC | Age: 79
End: 2024-08-23

## 2024-08-23 PROCEDURE — 93294 REM INTERROG EVL PM/LDLS PM: CPT

## 2024-08-23 PROCEDURE — 93296 REM INTERROG EVL PM/IDS: CPT

## 2024-09-17 RX ORDER — INFLUENZA A VIRUS A/VICTORIA/4897/2022 IVR-238 (H1N1) ANTIGEN (FORMALDEHYDE INACTIVATED), INFLUENZA A VIRUS A/CALIFORNIA/122/2022 SAN-022 (H3N2) ANTIGEN (FORMALDEHYDE INACTIVATED), AND INFLUENZA B VIRUS B/MICHIGAN/01/2021 ANTIGEN (FORMALDEHYDE INACTIVATED) 60; 60; 60 UG/.5ML; UG/.5ML; UG/.5ML
0.5 INJECTION, SUSPENSION INTRAMUSCULAR
Qty: 1 | Refills: 0 | Status: ACTIVE | OUTPATIENT
Start: 2024-09-17

## 2024-09-18 RX ORDER — INFLUENZA A VIRUS A/VICTORIA/4897/2022 IVR-238 (H1N1) ANTIGEN (FORMALDEHYDE INACTIVATED), INFLUENZA A VIRUS A/CALIFORNIA/122/2022 SAN-022 (H3N2) ANTIGEN (FORMALDEHYDE INACTIVATED), AND INFLUENZA B VIRUS B/MICHIGAN/01/2021 ANTIGEN (FORMALDEHYDE INACTIVATED) 60; 60; 60 UG/.5ML; UG/.5ML; UG/.5ML
0.5 INJECTION, SUSPENSION INTRAMUSCULAR
Qty: 1 | Refills: 0 | Status: ACTIVE | COMMUNITY
Start: 2024-09-18 | End: 1900-01-01

## 2024-10-07 ENCOUNTER — RX RENEWAL (OUTPATIENT)
Age: 79
End: 2024-10-07

## 2024-10-07 ENCOUNTER — APPOINTMENT (OUTPATIENT)
Dept: OTOLARYNGOLOGY | Facility: CLINIC | Age: 79
End: 2024-10-07
Payer: MEDICARE

## 2024-10-07 VITALS
OXYGEN SATURATION: 96 % | WEIGHT: 165 LBS | DIASTOLIC BLOOD PRESSURE: 79 MMHG | SYSTOLIC BLOOD PRESSURE: 130 MMHG | HEART RATE: 72 BPM | TEMPERATURE: 96.1 F | HEIGHT: 69 IN | BODY MASS INDEX: 24.44 KG/M2

## 2024-10-07 DIAGNOSIS — J34.2 DEVIATED NASAL SEPTUM: ICD-10-CM

## 2024-10-07 DIAGNOSIS — H61.23 IMPACTED CERUMEN, BILATERAL: ICD-10-CM

## 2024-10-07 DIAGNOSIS — R43.2 PARAGEUSIA: ICD-10-CM

## 2024-10-07 DIAGNOSIS — R43.9 UNSPECIFIED DISTURBANCES OF SMELL AND TASTE: ICD-10-CM

## 2024-10-07 PROCEDURE — 69210 REMOVE IMPACTED EAR WAX UNI: CPT

## 2024-10-07 PROCEDURE — 99214 OFFICE O/P EST MOD 30 MIN: CPT | Mod: 25

## 2024-10-28 ENCOUNTER — NON-APPOINTMENT (OUTPATIENT)
Age: 79
End: 2024-10-28

## 2024-10-28 ENCOUNTER — APPOINTMENT (OUTPATIENT)
Dept: HEART AND VASCULAR | Facility: CLINIC | Age: 79
End: 2024-10-28
Payer: MEDICARE

## 2024-10-28 ENCOUNTER — APPOINTMENT (OUTPATIENT)
Dept: OTOLARYNGOLOGY | Facility: CLINIC | Age: 79
End: 2024-10-28
Payer: MEDICARE

## 2024-10-28 VITALS
TEMPERATURE: 97.5 F | HEART RATE: 80 BPM | SYSTOLIC BLOOD PRESSURE: 101 MMHG | BODY MASS INDEX: 25.18 KG/M2 | HEIGHT: 69 IN | WEIGHT: 170 LBS | DIASTOLIC BLOOD PRESSURE: 71 MMHG

## 2024-10-28 VITALS
HEIGHT: 69 IN | BODY MASS INDEX: 25.18 KG/M2 | DIASTOLIC BLOOD PRESSURE: 72 MMHG | WEIGHT: 170 LBS | SYSTOLIC BLOOD PRESSURE: 133 MMHG | HEART RATE: 69 BPM | TEMPERATURE: 97.7 F

## 2024-10-28 DIAGNOSIS — R43.8 OTHER DISTURBANCES OF SMELL AND TASTE: ICD-10-CM

## 2024-10-28 DIAGNOSIS — Z98.890 OTHER SPECIFIED POSTPROCEDURAL STATES: ICD-10-CM

## 2024-10-28 PROCEDURE — 99213 OFFICE O/P EST LOW 20 MIN: CPT | Mod: 25

## 2024-10-28 PROCEDURE — 31231 NASAL ENDOSCOPY DX: CPT

## 2024-10-28 PROCEDURE — 93280 PM DEVICE PROGR EVAL DUAL: CPT

## 2024-10-28 PROCEDURE — 99214 OFFICE O/P EST MOD 30 MIN: CPT | Mod: 25

## 2024-10-29 RX ORDER — CHLORHEXIDINE GLUCONATE, 0.12% ORAL RINSE 1.2 MG/ML
0.12 SOLUTION DENTAL
Qty: 473 | Refills: 0 | Status: ACTIVE | COMMUNITY
Start: 2024-05-15

## 2024-10-29 RX ORDER — SODIUM SULFATE, POTASSIUM SULFATE AND MAGNESIUM SULFATE 1.6; 3.13; 17.5 G/177ML; G/177ML; G/177ML
17.5-3.13-1.6 SOLUTION ORAL
Qty: 354 | Refills: 0 | Status: ACTIVE | COMMUNITY
Start: 2024-06-19

## 2024-11-20 ENCOUNTER — APPOINTMENT (OUTPATIENT)
Dept: AFTER HOURS CARE | Facility: EMERGENCY ROOM | Age: 79
End: 2024-11-20

## 2024-11-20 DIAGNOSIS — U07.1 COVID-19: ICD-10-CM

## 2024-11-20 PROCEDURE — 99204 OFFICE O/P NEW MOD 45 MIN: CPT

## 2024-11-20 RX ORDER — MOLNUPIRAVIR 200 MG/1
200 CAPSULE ORAL
Qty: 40 | Refills: 0 | Status: ACTIVE | COMMUNITY
Start: 2024-11-20 | End: 1900-01-01

## 2024-11-21 ENCOUNTER — APPOINTMENT (OUTPATIENT)
Dept: INTERNAL MEDICINE | Facility: CLINIC | Age: 79
End: 2024-11-21

## 2024-11-24 PROBLEM — T50.905A ADVERSE EFFECT OF DRUG, INITIAL ENCOUNTER: Status: ACTIVE | Noted: 2024-11-24

## 2024-11-26 ENCOUNTER — APPOINTMENT (OUTPATIENT)
Dept: UROLOGY | Facility: CLINIC | Age: 79
End: 2024-11-26
Payer: MEDICARE

## 2024-11-26 VITALS
SYSTOLIC BLOOD PRESSURE: 124 MMHG | HEART RATE: 79 BPM | DIASTOLIC BLOOD PRESSURE: 81 MMHG | BODY MASS INDEX: 25.18 KG/M2 | WEIGHT: 170 LBS | HEIGHT: 69 IN | TEMPERATURE: 97.2 F

## 2024-11-26 DIAGNOSIS — E29.1 TESTICULAR HYPOFUNCTION: ICD-10-CM

## 2024-11-26 PROCEDURE — 99214 OFFICE O/P EST MOD 30 MIN: CPT

## 2024-11-26 PROCEDURE — G2211 COMPLEX E/M VISIT ADD ON: CPT

## 2024-11-27 ENCOUNTER — LABORATORY RESULT (OUTPATIENT)
Age: 79
End: 2024-11-27

## 2024-11-27 ENCOUNTER — APPOINTMENT (OUTPATIENT)
Dept: INTERNAL MEDICINE | Facility: CLINIC | Age: 79
End: 2024-11-27

## 2024-11-29 ENCOUNTER — NON-APPOINTMENT (OUTPATIENT)
Age: 79
End: 2024-11-29

## 2024-11-29 LAB
TESTOST FREE SERPL-MCNC: 0.8 PG/ML
TESTOST SERPL-MCNC: 213 NG/DL

## 2024-12-10 ENCOUNTER — RX RENEWAL (OUTPATIENT)
Age: 79
End: 2024-12-10

## 2024-12-17 ENCOUNTER — NON-APPOINTMENT (OUTPATIENT)
Age: 79
End: 2024-12-17

## 2024-12-18 ENCOUNTER — APPOINTMENT (OUTPATIENT)
Dept: UROLOGY | Facility: CLINIC | Age: 79
End: 2024-12-18
Payer: MEDICARE

## 2024-12-18 ENCOUNTER — NON-APPOINTMENT (OUTPATIENT)
Age: 79
End: 2024-12-18

## 2024-12-18 VITALS
BODY MASS INDEX: 25.18 KG/M2 | DIASTOLIC BLOOD PRESSURE: 71 MMHG | TEMPERATURE: 97.3 F | HEART RATE: 76 BPM | HEIGHT: 69 IN | SYSTOLIC BLOOD PRESSURE: 113 MMHG | WEIGHT: 170 LBS

## 2024-12-18 DIAGNOSIS — N40.1 BENIGN PROSTATIC HYPERPLASIA WITH LOWER URINARY TRACT SYMPMS: ICD-10-CM

## 2024-12-18 DIAGNOSIS — R35.1 BENIGN PROSTATIC HYPERPLASIA WITH LOWER URINARY TRACT SYMPMS: ICD-10-CM

## 2024-12-18 PROCEDURE — 51798 US URINE CAPACITY MEASURE: CPT

## 2024-12-18 PROCEDURE — 99214 OFFICE O/P EST MOD 30 MIN: CPT | Mod: 25

## 2024-12-19 LAB
APPEARANCE: CLEAR
BACTERIA: NEGATIVE /HPF
BILIRUBIN URINE: NEGATIVE
BLOOD URINE: NEGATIVE
CAST: 0 /LPF
COLOR: YELLOW
EPITHELIAL CELLS: 0 /HPF
GLUCOSE QUALITATIVE U: NEGATIVE MG/DL
KETONES URINE: NEGATIVE MG/DL
LEUKOCYTE ESTERASE URINE: NEGATIVE
MICROSCOPIC-UA: NORMAL
NITRITE URINE: NEGATIVE
PH URINE: 6.5
PROTEIN URINE: NEGATIVE MG/DL
RED BLOOD CELLS URINE: 1 /HPF
SPECIFIC GRAVITY URINE: 1.02
UROBILINOGEN URINE: 0.2 MG/DL
WHITE BLOOD CELLS URINE: 0 /HPF

## 2024-12-20 ENCOUNTER — NON-APPOINTMENT (OUTPATIENT)
Age: 79
End: 2024-12-20

## 2024-12-20 LAB — BACTERIA UR CULT: NORMAL

## 2024-12-26 ENCOUNTER — APPOINTMENT (OUTPATIENT)
Dept: UROLOGY | Facility: CLINIC | Age: 79
End: 2024-12-26

## 2024-12-26 ENCOUNTER — TRANSCRIPTION ENCOUNTER (OUTPATIENT)
Age: 79
End: 2024-12-26

## 2025-01-09 ENCOUNTER — NON-APPOINTMENT (OUTPATIENT)
Age: 80
End: 2025-01-09

## 2025-01-09 RX ORDER — ALFUZOSIN HYDROCHLORIDE 10 MG/1
10 TABLET, EXTENDED RELEASE ORAL DAILY
Qty: 30 | Refills: 3 | Status: ACTIVE | COMMUNITY
Start: 2025-01-09 | End: 1900-01-01

## 2025-01-24 NOTE — REVIEW OF SYSTEMS
Signs Of Vitality Reviewed: Yes    IMPORTANT EVENTS THIS SHIFT:  No acute events overnight     IMPORTANT EVENTS COMING UP/GOALS (PLEASE INCLUDE WHITE BOARD AND DISCHARGE BOARD UPDATES):   PATIENT SPECIAL NEEDS/ACCOMMODATIONS:  Tele on  Cardiac diet  Up ad rashida          [Dyspnea on exertion] : dyspnea during exertion [Depression] : depression [Anxiety] : anxiety [Negative] : Heme/Lymph

## 2025-01-27 ENCOUNTER — NON-APPOINTMENT (OUTPATIENT)
Age: 80
End: 2025-01-27

## 2025-01-27 ENCOUNTER — APPOINTMENT (OUTPATIENT)
Dept: HEART AND VASCULAR | Facility: CLINIC | Age: 80
End: 2025-01-27

## 2025-01-27 PROCEDURE — 93294 REM INTERROG EVL PM/LDLS PM: CPT

## 2025-01-27 PROCEDURE — 93296 REM INTERROG EVL PM/IDS: CPT

## 2025-03-12 ENCOUNTER — NON-APPOINTMENT (OUTPATIENT)
Age: 80
End: 2025-03-12

## 2025-03-12 ENCOUNTER — APPOINTMENT (OUTPATIENT)
Dept: UROLOGY | Facility: CLINIC | Age: 80
End: 2025-03-12
Payer: MEDICARE

## 2025-03-12 ENCOUNTER — APPOINTMENT (OUTPATIENT)
Dept: FAMILY MEDICINE | Facility: CLINIC | Age: 80
End: 2025-03-12

## 2025-03-12 VITALS
DIASTOLIC BLOOD PRESSURE: 72 MMHG | HEIGHT: 69 IN | WEIGHT: 165 LBS | HEART RATE: 80 BPM | BODY MASS INDEX: 24.44 KG/M2 | TEMPERATURE: 97.7 F | SYSTOLIC BLOOD PRESSURE: 118 MMHG

## 2025-03-12 VITALS
DIASTOLIC BLOOD PRESSURE: 90 MMHG | OXYGEN SATURATION: 98 % | BODY MASS INDEX: 24.44 KG/M2 | HEART RATE: 91 BPM | SYSTOLIC BLOOD PRESSURE: 125 MMHG | WEIGHT: 165 LBS | HEIGHT: 69 IN | TEMPERATURE: 96.6 F

## 2025-03-12 DIAGNOSIS — F32.A ANXIETY DISORDER, UNSPECIFIED: ICD-10-CM

## 2025-03-12 DIAGNOSIS — I10 ESSENTIAL (PRIMARY) HYPERTENSION: ICD-10-CM

## 2025-03-12 DIAGNOSIS — F41.9 ANXIETY DISORDER, UNSPECIFIED: ICD-10-CM

## 2025-03-12 DIAGNOSIS — E78.5 HYPERLIPIDEMIA, UNSPECIFIED: ICD-10-CM

## 2025-03-12 DIAGNOSIS — E29.1 TESTICULAR HYPOFUNCTION: ICD-10-CM

## 2025-03-12 DIAGNOSIS — R63.4 ABNORMAL WEIGHT LOSS: ICD-10-CM

## 2025-03-12 DIAGNOSIS — K76.0 FATTY (CHANGE OF) LIVER, NOT ELSEWHERE CLASSIFIED: ICD-10-CM

## 2025-03-12 PROCEDURE — 99214 OFFICE O/P EST MOD 30 MIN: CPT

## 2025-03-12 PROCEDURE — G2211 COMPLEX E/M VISIT ADD ON: CPT

## 2025-03-12 RX ORDER — MIRTAZAPINE 7.5 MG/1
7.5 TABLET, FILM COATED ORAL
Qty: 30 | Refills: 0 | Status: ACTIVE | COMMUNITY
Start: 2025-03-12

## 2025-03-13 LAB
ALBUMIN SERPL ELPH-MCNC: 4.6 G/DL
ALP BLD-CCNC: 61 U/L
ALT SERPL-CCNC: 52 U/L
ANION GAP SERPL CALC-SCNC: 12 MMOL/L
AST SERPL-CCNC: 30 U/L
BASOPHILS # BLD AUTO: 0.04 K/UL
BASOPHILS NFR BLD AUTO: 0.6 %
BILIRUB SERPL-MCNC: 1.1 MG/DL
BUN SERPL-MCNC: 16 MG/DL
CALCIUM SERPL-MCNC: 10 MG/DL
CHLORIDE SERPL-SCNC: 102 MMOL/L
CHOLEST SERPL-MCNC: 111 MG/DL
CO2 SERPL-SCNC: 28 MMOL/L
CREAT SERPL-MCNC: 1.06 MG/DL
EGFRCR SERPLBLD CKD-EPI 2021: 72 ML/MIN/1.73M2
EOSINOPHIL # BLD AUTO: 0.17 K/UL
EOSINOPHIL NFR BLD AUTO: 2.4 %
ESTIMATED AVERAGE GLUCOSE: 111 MG/DL
FOLATE SERPL-MCNC: 14 NG/ML
GLUCOSE SERPL-MCNC: 110 MG/DL
HBA1C MFR BLD HPLC: 5.5 %
HCT VFR BLD CALC: 44 %
HDLC SERPL-MCNC: 45 MG/DL
HGB BLD-MCNC: 14.8 G/DL
IMM GRANULOCYTES NFR BLD AUTO: 0.4 %
IRON SATN MFR SERPL: 29 %
IRON SERPL-MCNC: 92 UG/DL
LDLC SERPL CALC-MCNC: 50 MG/DL
LYMPHOCYTES # BLD AUTO: 0.8 K/UL
LYMPHOCYTES NFR BLD AUTO: 11.2 %
MAN DIFF?: NORMAL
MCHC RBC-ENTMCNC: 32.2 PG
MCHC RBC-ENTMCNC: 33.6 G/DL
MCV RBC AUTO: 95.7 FL
MONOCYTES # BLD AUTO: 0.72 K/UL
MONOCYTES NFR BLD AUTO: 10 %
NEUTROPHILS # BLD AUTO: 5.41 K/UL
NEUTROPHILS NFR BLD AUTO: 75.4 %
NONHDLC SERPL-MCNC: 66 MG/DL
PLATELET # BLD AUTO: 204 K/UL
POTASSIUM SERPL-SCNC: 4.1 MMOL/L
PROT SERPL-MCNC: 7.2 G/DL
PSA SERPL-MCNC: 3.82 NG/ML
RBC # BLD: 4.6 M/UL
RBC # FLD: 13.4 %
SODIUM SERPL-SCNC: 142 MMOL/L
TIBC SERPL-MCNC: 321 UG/DL
TRIGL SERPL-MCNC: 77 MG/DL
TSH SERPL-ACNC: 1.55 UIU/ML
UIBC SERPL-MCNC: 229 UG/DL
VIT B12 SERPL-MCNC: 515 PG/ML
WBC # FLD AUTO: 7.17 K/UL

## 2025-03-14 ENCOUNTER — RX RENEWAL (OUTPATIENT)
Age: 80
End: 2025-03-14

## 2025-03-14 ENCOUNTER — TRANSCRIPTION ENCOUNTER (OUTPATIENT)
Age: 80
End: 2025-03-14

## 2025-03-14 DIAGNOSIS — N40.1 BENIGN PROSTATIC HYPERPLASIA WITH LOWER URINARY TRACT SYMPMS: ICD-10-CM

## 2025-03-14 DIAGNOSIS — R97.20 ELEVATED PROSTATE, SPECIFIC ANTIGEN [PSA]: ICD-10-CM

## 2025-03-14 DIAGNOSIS — R35.1 BENIGN PROSTATIC HYPERPLASIA WITH LOWER URINARY TRACT SYMPMS: ICD-10-CM

## 2025-03-14 LAB
ALBUMIN SERPL ELPH-MCNC: 4.5 G/DL
ALP BLD-CCNC: 63 U/L
ALT SERPL-CCNC: 50 U/L
ANION GAP SERPL CALC-SCNC: 13 MMOL/L
AST SERPL-CCNC: 32 U/L
BASOPHILS # BLD AUTO: 0.05 K/UL
BASOPHILS NFR BLD AUTO: 0.6 %
BILIRUB SERPL-MCNC: 1.2 MG/DL
BUN SERPL-MCNC: 16 MG/DL
CALCIUM SERPL-MCNC: 9.7 MG/DL
CHLORIDE SERPL-SCNC: 103 MMOL/L
CO2 SERPL-SCNC: 29 MMOL/L
CREAT SERPL-MCNC: 1.16 MG/DL
EGFRCR SERPLBLD CKD-EPI 2021: 64 ML/MIN/1.73M2
EOSINOPHIL # BLD AUTO: 0.17 K/UL
EOSINOPHIL NFR BLD AUTO: 2.1 %
ESTRADIOL SERPL-MCNC: 10 PG/ML
GLUCOSE SERPL-MCNC: 116 MG/DL
HCT VFR BLD CALC: 45.8 %
HGB BLD-MCNC: 15.2 G/DL
IMM GRANULOCYTES NFR BLD AUTO: 0.4 %
LYMPHOCYTES # BLD AUTO: 1.1 K/UL
LYMPHOCYTES NFR BLD AUTO: 13.7 %
MAN DIFF?: NORMAL
MCHC RBC-ENTMCNC: 32.3 PG
MCHC RBC-ENTMCNC: 33.2 G/DL
MCV RBC AUTO: 97.2 FL
MONOCYTES # BLD AUTO: 0.86 K/UL
MONOCYTES NFR BLD AUTO: 10.7 %
NEUTROPHILS # BLD AUTO: 5.83 K/UL
NEUTROPHILS NFR BLD AUTO: 72.5 %
PLATELET # BLD AUTO: 198 K/UL
POTASSIUM SERPL-SCNC: 4.6 MMOL/L
PROT SERPL-MCNC: 7.3 G/DL
RBC # BLD: 4.71 M/UL
RBC # FLD: 13.4 %
SODIUM SERPL-SCNC: 144 MMOL/L
TESTOST SERPL-MCNC: 712 NG/DL
WBC # FLD AUTO: 8.04 K/UL

## 2025-03-18 RX ORDER — TADALAFIL 5 MG/1
5 TABLET ORAL
Qty: 30 | Refills: 3 | Status: ACTIVE | COMMUNITY
Start: 2025-03-12 | End: 1900-01-01

## 2025-04-02 ENCOUNTER — APPOINTMENT (OUTPATIENT)
Dept: OPHTHALMOLOGY | Facility: CLINIC | Age: 80
End: 2025-04-02
Payer: MEDICARE

## 2025-04-02 ENCOUNTER — NON-APPOINTMENT (OUTPATIENT)
Age: 80
End: 2025-04-02

## 2025-04-02 PROCEDURE — 92014 COMPRE OPH EXAM EST PT 1/>: CPT

## 2025-04-16 ENCOUNTER — NON-APPOINTMENT (OUTPATIENT)
Age: 80
End: 2025-04-16

## 2025-04-18 ENCOUNTER — APPOINTMENT (OUTPATIENT)
Dept: INTERNAL MEDICINE | Facility: CLINIC | Age: 80
End: 2025-04-18

## 2025-04-18 VITALS
TEMPERATURE: 98.1 F | HEART RATE: 85 BPM | HEIGHT: 69 IN | DIASTOLIC BLOOD PRESSURE: 76 MMHG | OXYGEN SATURATION: 97 % | SYSTOLIC BLOOD PRESSURE: 125 MMHG | BODY MASS INDEX: 24.44 KG/M2 | WEIGHT: 165 LBS

## 2025-04-18 DIAGNOSIS — R41.3 OTHER AMNESIA: ICD-10-CM

## 2025-04-18 PROCEDURE — 99213 OFFICE O/P EST LOW 20 MIN: CPT

## 2025-04-24 ENCOUNTER — RX RENEWAL (OUTPATIENT)
Age: 80
End: 2025-04-24

## 2025-04-28 ENCOUNTER — APPOINTMENT (OUTPATIENT)
Dept: HEART AND VASCULAR | Facility: CLINIC | Age: 80
End: 2025-04-28
Payer: MEDICARE

## 2025-04-28 VITALS
BODY MASS INDEX: 23.99 KG/M2 | DIASTOLIC BLOOD PRESSURE: 70 MMHG | TEMPERATURE: 97.5 F | SYSTOLIC BLOOD PRESSURE: 140 MMHG | HEIGHT: 69 IN | WEIGHT: 162 LBS | HEART RATE: 82 BPM

## 2025-04-28 PROCEDURE — 93280 PM DEVICE PROGR EVAL DUAL: CPT

## 2025-04-28 PROCEDURE — 99213 OFFICE O/P EST LOW 20 MIN: CPT | Mod: 25

## 2025-05-07 ENCOUNTER — NON-APPOINTMENT (OUTPATIENT)
Age: 80
End: 2025-05-07

## 2025-05-07 ENCOUNTER — APPOINTMENT (OUTPATIENT)
Dept: UROLOGY | Facility: CLINIC | Age: 80
End: 2025-05-07
Payer: MEDICARE

## 2025-05-07 VITALS
BODY MASS INDEX: 23.99 KG/M2 | WEIGHT: 162 LBS | HEIGHT: 69 IN | TEMPERATURE: 97.4 F | DIASTOLIC BLOOD PRESSURE: 84 MMHG | SYSTOLIC BLOOD PRESSURE: 139 MMHG | HEART RATE: 89 BPM

## 2025-05-07 DIAGNOSIS — N40.1 BENIGN PROSTATIC HYPERPLASIA WITH LOWER URINARY TRACT SYMPMS: ICD-10-CM

## 2025-05-07 DIAGNOSIS — R35.1 BENIGN PROSTATIC HYPERPLASIA WITH LOWER URINARY TRACT SYMPMS: ICD-10-CM

## 2025-05-07 PROCEDURE — 99214 OFFICE O/P EST MOD 30 MIN: CPT

## 2025-05-17 ENCOUNTER — RX RENEWAL (OUTPATIENT)
Age: 80
End: 2025-05-17

## 2025-05-23 ENCOUNTER — APPOINTMENT (OUTPATIENT)
Dept: UROLOGY | Facility: CLINIC | Age: 80
End: 2025-05-23
Payer: MEDICARE

## 2025-05-23 ENCOUNTER — NON-APPOINTMENT (OUTPATIENT)
Age: 80
End: 2025-05-23

## 2025-05-23 VITALS
HEIGHT: 69 IN | BODY MASS INDEX: 23.99 KG/M2 | WEIGHT: 162 LBS | SYSTOLIC BLOOD PRESSURE: 142 MMHG | TEMPERATURE: 97.3 F | HEART RATE: 112 BPM | DIASTOLIC BLOOD PRESSURE: 87 MMHG

## 2025-05-23 DIAGNOSIS — N40.1 BENIGN PROSTATIC HYPERPLASIA WITH LOWER URINARY TRACT SYMPMS: ICD-10-CM

## 2025-05-23 DIAGNOSIS — R35.1 BENIGN PROSTATIC HYPERPLASIA WITH LOWER URINARY TRACT SYMPMS: ICD-10-CM

## 2025-05-23 PROCEDURE — G2211 COMPLEX E/M VISIT ADD ON: CPT

## 2025-05-23 PROCEDURE — 99214 OFFICE O/P EST MOD 30 MIN: CPT

## 2025-05-23 PROCEDURE — 51798 US URINE CAPACITY MEASURE: CPT

## 2025-05-24 LAB
APPEARANCE: CLEAR
BACTERIA: NEGATIVE /HPF
BILIRUBIN URINE: NEGATIVE
BLOOD URINE: NEGATIVE
CALCIUM OXALATE CRYSTALS: PRESENT
CAST: 2 /LPF
COLOR: YELLOW
EPITHELIAL CELLS: 2 /HPF
GLUCOSE QUALITATIVE U: NEGATIVE MG/DL
KETONES URINE: NEGATIVE MG/DL
LEUKOCYTE ESTERASE URINE: NEGATIVE
MICROSCOPIC-UA: NORMAL
NITRITE URINE: NEGATIVE
PH URINE: 5.5
PROTEIN URINE: NEGATIVE MG/DL
RED BLOOD CELLS URINE: 3 /HPF
REVIEW: NORMAL
SPECIFIC GRAVITY URINE: 1.02
UROBILINOGEN URINE: 0.2 MG/DL
WHITE BLOOD CELLS URINE: 1 /HPF

## 2025-05-27 ENCOUNTER — NON-APPOINTMENT (OUTPATIENT)
Age: 80
End: 2025-05-27

## 2025-05-28 LAB — BACTERIA UR CULT: NORMAL

## 2025-06-09 ENCOUNTER — RX RENEWAL (OUTPATIENT)
Age: 80
End: 2025-06-09

## 2025-06-11 ENCOUNTER — APPOINTMENT (OUTPATIENT)
Dept: FAMILY MEDICINE | Facility: CLINIC | Age: 80
End: 2025-06-11
Payer: MEDICARE

## 2025-06-11 VITALS
HEART RATE: 74 BPM | SYSTOLIC BLOOD PRESSURE: 127 MMHG | TEMPERATURE: 97.7 F | OXYGEN SATURATION: 97 % | DIASTOLIC BLOOD PRESSURE: 74 MMHG | HEIGHT: 69 IN | BODY MASS INDEX: 24.44 KG/M2 | WEIGHT: 165 LBS

## 2025-06-11 PROCEDURE — 36415 COLL VENOUS BLD VENIPUNCTURE: CPT

## 2025-06-11 PROCEDURE — 99214 OFFICE O/P EST MOD 30 MIN: CPT | Mod: 25

## 2025-06-12 LAB
ALBUMIN SERPL ELPH-MCNC: 4.5 G/DL
ALP BLD-CCNC: 58 U/L
ALT SERPL-CCNC: 71 U/L
ANION GAP SERPL CALC-SCNC: 14 MMOL/L
AST SERPL-CCNC: 42 U/L
BASOPHILS # BLD AUTO: 0.06 K/UL
BASOPHILS NFR BLD AUTO: 0.8 %
BILIRUB SERPL-MCNC: 0.9 MG/DL
BUN SERPL-MCNC: 18 MG/DL
CALCIUM SERPL-MCNC: 9.7 MG/DL
CHLORIDE SERPL-SCNC: 103 MMOL/L
CHOLEST SERPL-MCNC: 109 MG/DL
CO2 SERPL-SCNC: 26 MMOL/L
CREAT SERPL-MCNC: 1.09 MG/DL
EGFRCR SERPLBLD CKD-EPI 2021: 69 ML/MIN/1.73M2
EOSINOPHIL # BLD AUTO: 0.18 K/UL
EOSINOPHIL NFR BLD AUTO: 2.4 %
ESTIMATED AVERAGE GLUCOSE: 114 MG/DL
GLUCOSE SERPL-MCNC: 104 MG/DL
HBA1C MFR BLD HPLC: 5.6 %
HCT VFR BLD CALC: 45.1 %
HDLC SERPL-MCNC: 54 MG/DL
HGB BLD-MCNC: 14.3 G/DL
IMM GRANULOCYTES NFR BLD AUTO: 0.3 %
LDLC SERPL-MCNC: 40 MG/DL
LYMPHOCYTES # BLD AUTO: 0.93 K/UL
LYMPHOCYTES NFR BLD AUTO: 12.6 %
MAN DIFF?: NORMAL
MCHC RBC-ENTMCNC: 31.7 G/DL
MCHC RBC-ENTMCNC: 31.7 PG
MCV RBC AUTO: 100 FL
MONOCYTES # BLD AUTO: 0.97 K/UL
MONOCYTES NFR BLD AUTO: 13.2 %
NEUTROPHILS # BLD AUTO: 5.21 K/UL
NEUTROPHILS NFR BLD AUTO: 70.7 %
NONHDLC SERPL-MCNC: 55 MG/DL
PLATELET # BLD AUTO: 189 K/UL
POTASSIUM SERPL-SCNC: 4.3 MMOL/L
PROT SERPL-MCNC: 7.1 G/DL
RBC # BLD: 4.51 M/UL
RBC # FLD: 13.7 %
SODIUM SERPL-SCNC: 144 MMOL/L
TRIGL SERPL-MCNC: 68 MG/DL
TSH SERPL-ACNC: 2.42 UIU/ML
WBC # FLD AUTO: 7.37 K/UL

## 2025-06-16 ENCOUNTER — RX RENEWAL (OUTPATIENT)
Age: 80
End: 2025-06-16

## 2025-07-28 ENCOUNTER — NON-APPOINTMENT (OUTPATIENT)
Age: 80
End: 2025-07-28

## 2025-07-28 ENCOUNTER — APPOINTMENT (OUTPATIENT)
Dept: HEART AND VASCULAR | Facility: CLINIC | Age: 80
End: 2025-07-28
Payer: MEDICARE

## 2025-07-28 PROCEDURE — 93294 REM INTERROG EVL PM/LDLS PM: CPT

## 2025-07-28 PROCEDURE — 93296 REM INTERROG EVL PM/IDS: CPT

## 2025-08-04 ENCOUNTER — NON-APPOINTMENT (OUTPATIENT)
Age: 80
End: 2025-08-04

## 2025-09-09 ENCOUNTER — RX RENEWAL (OUTPATIENT)
Age: 80
End: 2025-09-09

## 2025-09-09 DIAGNOSIS — E29.1 TESTICULAR HYPOFUNCTION: ICD-10-CM

## 2025-09-09 RX ORDER — TESTOSTERONE ENANTHATE 75 MG/.5ML
75 INJECTION SUBCUTANEOUS
Qty: 4 | Refills: 0 | Status: ACTIVE | COMMUNITY
Start: 2025-09-09 | End: 1900-01-01

## 2025-09-15 ENCOUNTER — TRANSCRIPTION ENCOUNTER (OUTPATIENT)
Age: 80
End: 2025-09-15

## 2025-09-17 ENCOUNTER — TRANSCRIPTION ENCOUNTER (OUTPATIENT)
Age: 80
End: 2025-09-17

## 2025-09-18 LAB
ALBUMIN SERPL ELPH-MCNC: 4.6 G/DL
ALP BLD-CCNC: 50 U/L
ALT SERPL-CCNC: 31 U/L
ANION GAP SERPL CALC-SCNC: 15 MMOL/L
AST SERPL-CCNC: 28 U/L
BASOPHILS # BLD AUTO: 0.05 K/UL
BASOPHILS NFR BLD AUTO: 0.7 %
BILIRUB SERPL-MCNC: 1.1 MG/DL
BUN SERPL-MCNC: 20 MG/DL
CALCIUM SERPL-MCNC: 9.5 MG/DL
CHLORIDE SERPL-SCNC: 102 MMOL/L
CO2 SERPL-SCNC: 25 MMOL/L
CREAT SERPL-MCNC: 1.06 MG/DL
EGFRCR SERPLBLD CKD-EPI 2021: 72 ML/MIN/1.73M2
EOSINOPHIL # BLD AUTO: 0.08 K/UL
EOSINOPHIL NFR BLD AUTO: 1.2 %
ESTRADIOL SERPL-MCNC: 20 PG/ML
GLUCOSE SERPL-MCNC: 100 MG/DL
HCT VFR BLD CALC: 38.7 %
HGB BLD-MCNC: 12.1 G/DL
IMM GRANULOCYTES NFR BLD AUTO: 0.1 %
LYMPHOCYTES # BLD AUTO: 0.72 K/UL
LYMPHOCYTES NFR BLD AUTO: 10.6 %
MAN DIFF?: NORMAL
MCHC RBC-ENTMCNC: 30.4 PG
MCHC RBC-ENTMCNC: 31.3 G/DL
MCV RBC AUTO: 97.2 FL
MONOCYTES # BLD AUTO: 1.03 K/UL
MONOCYTES NFR BLD AUTO: 15.1 %
NEUTROPHILS # BLD AUTO: 4.91 K/UL
NEUTROPHILS NFR BLD AUTO: 72.3 %
PLATELET # BLD AUTO: 196 K/UL
POTASSIUM SERPL-SCNC: 3.9 MMOL/L
PROT SERPL-MCNC: 7 G/DL
RBC # BLD: 3.98 M/UL
RBC # FLD: 12.8 %
SODIUM SERPL-SCNC: 142 MMOL/L
TESTOST SERPL-MCNC: 695 NG/DL
WBC # FLD AUTO: 6.8 K/UL